# Patient Record
Sex: MALE | Race: WHITE | NOT HISPANIC OR LATINO | Employment: OTHER | URBAN - METROPOLITAN AREA
[De-identification: names, ages, dates, MRNs, and addresses within clinical notes are randomized per-mention and may not be internally consistent; named-entity substitution may affect disease eponyms.]

---

## 2017-06-15 ENCOUNTER — OFFICE VISIT (OUTPATIENT)
Dept: INTERNAL MEDICINE | Facility: CLINIC | Age: 82
End: 2017-06-15
Payer: MEDICARE

## 2017-06-15 VITALS
WEIGHT: 203.8 LBS | BODY MASS INDEX: 29.18 KG/M2 | SYSTOLIC BLOOD PRESSURE: 134 MMHG | TEMPERATURE: 98.3 F | OXYGEN SATURATION: 95 % | HEIGHT: 70 IN | DIASTOLIC BLOOD PRESSURE: 76 MMHG | HEART RATE: 70 BPM

## 2017-06-15 DIAGNOSIS — I10 ESSENTIAL HYPERTENSION, BENIGN: Primary | ICD-10-CM

## 2017-06-15 DIAGNOSIS — K59.00 CONSTIPATION, UNSPECIFIED CONSTIPATION TYPE: ICD-10-CM

## 2017-06-15 PROCEDURE — 99213 OFFICE O/P EST LOW 20 MIN: CPT | Performed by: INTERNAL MEDICINE

## 2017-06-15 NOTE — PROGRESS NOTES
SUBJECTIVE:                                                    Abundio Bekcett is a 81 year old male who presents to clinic today for the following health issues:    Constipation      Duration: Chronic, worsening lately      Description:       Frequency of bowel movements: 4-5 days       Consistency of stool: large, very hard     Intensity:  moderate    Accompanying signs and symptoms:        Abdominal pain: YES       Rectal pain: no        Blood in stool: no        Nausea/vomitting: no     History:        Similar problems in past: YES    Precipitating or alleviating factors:        Medications worsening symptoms: no     Therapies tried and outcome: Stool softeners OTC- help, but is taking up to 4 x daily. Uses Enemas occassionally as well        Other concerns:  1. Increased fatigue, decreased mood-discussed but more likely due to social stressors from wife's memory loss.    Problem list and histories reviewed & adjusted, as indicated.  Additional history: as documented    Patient Active Problem List   Diagnosis     Hypertrophy (benign) of prostate     Allergic rhinitis     Advance Care Planning     Hyperlipidemia LDL goal <100     Hyperglycemia     Lacunar infarction (H)     Hypothyroidism, unspecified type     Essential hypertension, benign     Past Surgical History:   Procedure Laterality Date     C REMV PROSTATE,PERINEAL,RADICAL  2000     HC SHOULDER ARTHROSCOPY, DX         Social History   Substance Use Topics     Smoking status: Never Smoker     Smokeless tobacco: Not on file     Alcohol use Yes      Comment: rare     History reviewed. No pertinent family history.      Current Outpatient Prescriptions   Medication Sig Dispense Refill     amLODIPine (NORVASC) 5 MG tablet Take 1 tablet (5 mg) by mouth daily 90 tablet 3     simvastatin (ZOCOR) 20 MG tablet Take 1 tablet (20 mg) by mouth At Bedtime 90 tablet 3     levothyroxine (SYNTHROID, LEVOTHROID) 137 MCG tablet Take 1 tablet (137 mcg) by mouth daily 90  "tablet 3     mometasone (NASONEX) 50 MCG/ACT nasal spray Spray 2 sprays into both nostrils daily 3 Box prn     aspirin 325 MG EC tablet Take 1 tablet by mouth daily. 100 tablet 3     XALATAN 0.005 % OP SOLN qd       BETIMOL OP qd       CENTRUM SILVER OR TABS 1 TABLET DAILY       GLUCOSAMINE CHONDROITIN OR TABS        Allergies   Allergen Reactions     No Known Drug Allergies      BP Readings from Last 3 Encounters:   09/29/16 122/74   09/15/16 136/78   09/10/16 146/89    Wt Readings from Last 3 Encounters:   09/29/16 204 lb (92.5 kg)   09/15/16 205 lb (93 kg)   09/10/16 205 lb (93 kg)            Reviewed and updated as needed this visit by clinical staff  Tobacco  Allergies  Med Hx  Surg Hx  Fam Hx  Soc Hx      Reviewed and updated as needed this visit by Provider       ROS:  C: NEGATIVE for fever, chills, change in weight  E/M: NEGATIVE for ear, mouth and throat problems  R: NEGATIVE for significant cough or SOB  CV: NEGATIVE for chest pain, palpitations or peripheral edema  : NEGATIVE for frequency, dysuria, or hematuria  M: NEGATIVE for significant arthralgias or myalgia  H: NEGATIVE for bleeding problems  P: NEGATIVE for changes in mood or affect    OBJECTIVE:                                                    /76  Pulse 70  Temp 98.3  F (36.8  C) (Oral)  Ht 5' 10\" (1.778 m)  Wt 203 lb 12.8 oz (92.4 kg)  SpO2 95%  BMI 29.24 kg/m2  Body mass index is 29.24 kg/(m^2).  GENERAL: healthy, alert and no distress  RESP: lungs clear to auscultation - no rales, no rhonchi, no wheezes  CV: regular rates and rhythm, normal S1 S2, no S3 or S4 and no click or rub   ABDOMEN: no tenderness, no  hepatosplenomegaly, no masses, normal bowel sounds  MS: extremities- no gross deformities noted,  PSYCH: Alert and oriented times 3; speech- coherent , normal rate and volume; able to articulate logical thoughts, able to abstract reason, no tangential thoughts, no hallucinations or delusions, affect- normal "     ASSESSMENT/PLAN:                                                    (I10) Essential hypertension, benign  (primary encounter diagnosis)  Comment: stable on therapy  Plan: discussed Amlodipine as related to below    (K59.00) Constipation, unspecified constipation type  Comment: discussed options with patient, suggest GI referral, stop OTC products, push fluids, trial Miralax, noted negative FIT screen last done  Plan: GASTROENTEROLOGY ADULT REF CONSULT ONLY          See Patient Instructions    Colton Chris MD  [facility]

## 2017-06-15 NOTE — MR AVS SNAPSHOT
After Visit Summary   6/15/2017    Abundio Beckett    MRN: [id number]           Patient Information     Date Of Birth          1935        Visit Information        Provider Department      6/15/2017 3:20 PM Colton Chris MD [facility]        Today's Diagnoses     Essential hypertension, benign    -  1    Constipation, unspecified constipation type           Follow-ups after your visit        Additional Services     GASTROENTEROLOGY ADULT REF CONSULT ONLY       Preferred Location: [facility] [phone]      Please be aware that coverage of these services is subject to the terms and limitations of your health insurance plan.  Call member services at your health plan with any benefit or coverage questions.  Any procedures must be performed at a [place] facility OR coordinated by your clinic's referral office.    Please bring the following with you to your appointment:    (1) Any X-Rays, CTs or MRIs which have been performed.  Contact the facility where they were done to arrange for  prior to your scheduled appointment.    (2) List of current medications   (3) This referral request   (4) Any documents/labs given to you for this referral                  Who to contact     If you have questions or need follow up information about today's clinic visit or your schedule please contact [facility] directly at [phone].  Normal or non-critical lab and imaging results will be communicated to you by MyChart, letter or phone within 4 business days after the clinic has received the results. If you do not hear from us within 7 days, please contact the clinic through MyChart or phone. If you have a critical or abnormal lab result, we will notify you by phone as soon as possible.  Submit refill requests through [employer] or call your pharmacy and they will forward the refill request to us. Please allow 3 business days for your refill to be completed.  "         Additional Information About Your Visit        MyChart Information     [employer] lets you send messages to your doctor, view your test results, renew your prescriptions, schedule appointments and more. To sign up, go to [email or web address] . Click on \"Log in\" on the left side of the screen, which will take you to the Welcome page. Then click on \"Sign up Now\" on the right side of the page.     You will be asked to enter the access code listed below, as well as some personal information. Please follow the directions to create your username and password.     Your access code is: [email or web address]  Expires: 2017  4:03 PM     Your access code will  in 90 days. If you need help or a new code, please call your [place] clinic or [phone].        Care EveryWhere ID     This is your Care EveryWhere ID. This could be used by other organizations to access your [place] medical records  [id number]        Your Vitals Were     Pulse Temperature Height Pulse Oximetry BMI (Body Mass Index)       70 98.3  F (36.8  C) (Oral) 5' 10\" (1.778 m) 95% 29.24 kg/m2        Blood Pressure from Last 3 Encounters:   06/15/17 134/76   16 122/74   09/15/16 136/78    Weight from Last 3 Encounters:   06/15/17 203 lb 12.8 oz (92.4 kg)   16 204 lb (92.5 kg)   09/15/16 205 lb (93 kg)              We Performed the Following     GASTROENTEROLOGY ADULT REF CONSULT ONLY        Primary Care Provider Office Phone # Fax #    Colton Chris [phone]       [facility] [address] [facility] [address]        Thank you!     Thank you for choosing [facility]  for your care. Our goal is always to provide you with excellent care. Hearing back from our patients is one way we can continue to improve our services. Please take a few minutes to complete the written survey that you may receive in the mail after your visit with us. Thank you!             Your Updated " Medication List - Protect others around you: Learn how to safely use, store and throw away your medicines at www.disposemymeds.org.          This list is accurate as of: 6/15/17  4:03 PM.  Always use your most recent med list.                   Brand Name Dispense Instructions for use    amLODIPine 5 MG tablet    NORVASC    90 tablet    Take 1 tablet (5 mg) by mouth daily       aspirin 325 MG EC tablet     100 tablet    Take 1 tablet by mouth daily.       BETIMOL OP      qd       CENTRUM SILVER per tablet      1 TABLET DAILY       GLUCOSAMINE CHONDROITIN Tabs          levothyroxine 137 MCG tablet    SYNTHROID/LEVOTHROID    90 tablet    Take 1 tablet (137 mcg) by mouth daily       mometasone 50 MCG/ACT spray    NASONEX    3 Box    Spray 2 sprays into both nostrils daily       simvastatin 20 MG tablet    ZOCOR    90 tablet    Take 1 tablet (20 mg) by mouth At Bedtime       XALATAN 0.005 % ophthalmic solution   Generic drug:  latanoprost      qd

## 2017-06-15 NOTE — NURSING NOTE
"Chief Complaint   Patient presents with     Gastrointestinal Problem       Initial /76  Pulse 70  Temp 98.3  F (36.8  C) (Oral)  Ht 5' 10\" (1.778 m)  Wt 203 lb 12.8 oz (92.4 kg)  SpO2 95%  BMI 29.24 kg/m2 Estimated body mass index is 29.24 kg/(m^2) as calculated from the following:    Height as of this encounter: 5' 10\" (1.778 m).    Weight as of this encounter: 203 lb 12.8 oz (92.4 kg).  Medication Reconciliation: complete   Nataly Uribe, JAYE      "

## 2017-07-24 ENCOUNTER — TRANSFERRED RECORDS (OUTPATIENT)
Dept: HEALTH INFORMATION MANAGEMENT | Facility: CLINIC | Age: 82
End: 2017-07-24

## 2017-08-14 ENCOUNTER — TELEPHONE (OUTPATIENT)
Dept: INTERNAL MEDICINE | Facility: CLINIC | Age: 82
End: 2017-08-14

## 2017-08-14 DIAGNOSIS — E03.9 HYPOTHYROIDISM: Primary | ICD-10-CM

## 2017-08-14 NOTE — TELEPHONE ENCOUNTER
Writer called and advised patient per MD not. Lab placed as future. Patient verbalized understanding.

## 2017-08-14 NOTE — TELEPHONE ENCOUNTER
Reason for call:  Results   Name of test or procedure: Lab tests  Date of test or procedure: 7/24/ or 07/31/17  Location of test or procedure: GI clinic    Additional comments: Patient had labs drawn in [facility] Clinic, MD told him to talk to Dr. Chris regarding results      Phone number to reach patient:  Home number on file [phone] (home)    Best Time:  anytime    Can we leave a detailed message on this number?  YES

## 2017-08-14 NOTE — TELEPHONE ENCOUNTER
PATRICK WADSWORTH informed patient TSH level drawn on 7/24/17 elevated and told patient to contact PCP.  TSH lab scanned into [employer] under laboratory tab.  Please see result and advise.  Thank you.

## 2017-09-05 ENCOUNTER — TRANSFERRED RECORDS (OUTPATIENT)
Dept: HEALTH INFORMATION MANAGEMENT | Facility: CLINIC | Age: 82
End: 2017-09-05

## 2017-09-07 DIAGNOSIS — I10 ESSENTIAL HYPERTENSION: ICD-10-CM

## 2017-09-07 RX ORDER — AMLODIPINE BESYLATE 5 MG/1
TABLET ORAL
Qty: 90 TABLET | Refills: 2 | Status: SHIPPED | OUTPATIENT
Start: 2017-09-07 | End: 2018-06-03

## 2017-09-07 NOTE — TELEPHONE ENCOUNTER
amLODIPine (NORVASC) 5 MG tablet      Last Written Prescription Date: 9/29/2016  Last Fill Quantity: 90, # refills: 3    Last Office Visit with [facility], [facility] or [facility] prescribing provider:  6/15/2017   Future Office Visit:    Next 5 appointments (look out 90 days)     Sep 14, 2017  9:40 AM CDT   PHYSICAL with Colton Chris MD   [facility]    [address] [address] [phone]                    BP Readings from Last 3 Encounters:   06/15/17 134/76   09/29/16 122/74   09/15/16 136/78

## 2017-09-14 ENCOUNTER — OFFICE VISIT (OUTPATIENT)
Dept: INTERNAL MEDICINE | Facility: CLINIC | Age: 82
End: 2017-09-14
Payer: MEDICARE

## 2017-09-14 VITALS
SYSTOLIC BLOOD PRESSURE: 133 MMHG | WEIGHT: 206 LBS | HEART RATE: 69 BPM | TEMPERATURE: 98.3 F | BODY MASS INDEX: 29.49 KG/M2 | HEIGHT: 70 IN | DIASTOLIC BLOOD PRESSURE: 77 MMHG | OXYGEN SATURATION: 96 %

## 2017-09-14 DIAGNOSIS — E03.9 HYPOTHYROIDISM, UNSPECIFIED TYPE: ICD-10-CM

## 2017-09-14 DIAGNOSIS — I63.81 LACUNAR INFARCTION (H): ICD-10-CM

## 2017-09-14 DIAGNOSIS — Z12.11 COLON CANCER SCREENING: ICD-10-CM

## 2017-09-14 DIAGNOSIS — E78.5 HYPERLIPIDEMIA LDL GOAL <100: ICD-10-CM

## 2017-09-14 DIAGNOSIS — Z00.00 MEDICARE ANNUAL WELLNESS VISIT, SUBSEQUENT: Primary | ICD-10-CM

## 2017-09-14 DIAGNOSIS — Z23 NEED FOR PROPHYLACTIC VACCINATION AND INOCULATION AGAINST INFLUENZA: ICD-10-CM

## 2017-09-14 PROCEDURE — G0439 PPPS, SUBSEQ VISIT: HCPCS | Performed by: INTERNAL MEDICINE

## 2017-09-14 PROCEDURE — G0008 ADMIN INFLUENZA VIRUS VAC: HCPCS | Performed by: INTERNAL MEDICINE

## 2017-09-14 PROCEDURE — 90662 IIV NO PRSV INCREASED AG IM: CPT | Performed by: INTERNAL MEDICINE

## 2017-09-14 RX ORDER — LEVOTHYROXINE SODIUM 137 UG/1
137 TABLET ORAL DAILY
Qty: 90 TABLET | Refills: 3 | Status: SHIPPED | OUTPATIENT
Start: 2017-09-14 | End: 2018-09-11

## 2017-09-14 RX ORDER — SIMVASTATIN 20 MG
20 TABLET ORAL AT BEDTIME
Qty: 90 TABLET | Refills: 3 | Status: SHIPPED | OUTPATIENT
Start: 2017-09-14 | End: 2018-09-11

## 2017-09-14 NOTE — PROGRESS NOTES
Injectable Influenza Immunization Documentation    1.  Are you sick today? (Fever of 100.5 or higher on the day of the clinic)   No    2.  Have you ever had Guillain-Callaway Syndrome within 6 weeks of an influenza vaccionation?  No    3. Do you have a life-threatening allergy to eggs?  No    4. Do you have a life-threatening allergy to a component of the vaccine? May include antibiotics, gelatin or latex.  No     5. Have you ever had a reaction to a dose of flu vaccine that needed immediate medical attention?  No     Form completed by Nataly Uribe CMA

## 2017-09-14 NOTE — MR AVS SNAPSHOT
After Visit Summary   9/14/2017    Abundio Beckett    MRN: [id number]           Patient Information     Date Of Birth          1935        Visit Information        Provider Department      9/14/2017 9:40 AM Colton Chris MD [facility]        Today's Diagnoses     Medicare annual wellness visit, subsequent    -  1    Lacunar infarction (H)        Hypothyroidism, unspecified type        Hyperlipidemia LDL goal <100        Colon cancer screening          Care Instructions          Services Typically covered by Medicare Recommended Completed   Vaccines    Pneumonoccol    Influenza    Hepatitis B (if medium/high risk)     Once for patients after age 65    Yearly  Medium/high risk factors:    End Stage Kidney Disease    Hemophiliacs who received Factor XIII or IX concentrates    Clients of institutions for developmentally disabled    Persons who live in same house as a Hepatitis B carrier    Homosexual men    Illicit injectable drug users    Health care workers     Mammogram Covered: One-time screen between age 35-39, annually for age 40+     Pap and Pelvic Exam Covered: Annually if  high risk,  or childbearing age with abnormal Pap in last 3 years.  Q24 months for all other women     Prostate Cancer Screening    Digital rectal exam    PSA Covered: Annually for all men > age 50     Corolrectal Cancer Screening Screening colonoscopy every 10 years, more often for high risk patients     Diabetes Self-Management Training Requires referral by treating physician for patient with diabetes     Diabetes Screening    Fasting blood sugar or glucose tolerance test   Once yearly, twice yearly if prediabetic     Cardiovascular Screening Blood Tests    Total Cholesterol    HDL    Triglycerides Every 5 years     Medical Nutrition Therapy for Diabetes or Renal Disease Requires referral by treating physician for patient with diabetes or kidney disease     Glaucoma Screening Annually for  patients with one of the following risk factors:    Diabetes Mellitus    Family history of Glaucoma    -American age 50 and over    -American age 65 and over     Bone Mass Measurement Every 24 months if one of the following risk factors:    Estrogen deficiency    Vertebral abnormalities on x-ray indicative of Osteoporosis, Osteopenia, or Vertebral fracture    Receiving/expected to receive the equivalent of at least 5 mg of Prednisone per day for > 3 months    Hyperparathyroidism    Patient being monitored for response to Osteoporosis Therapy     One-time AAA screen  Must be ordered as part of Medicare IPPE   Any patient with a family history of AAA    Males Age 65-75, with history of smoking at least 100 cigarettes in lifetime     Smoking Cessation Counseling Beneficiaries who use tobacco are eligible to receive 2 cessation attempts per year; each attempt includes maximum of 4 sessions     HIV Screening Annually for beneficiaries at increased risk:       Increased risk for HIV infection is defined in the  National Coverage Determinations (NCD) Manual,  Publication 100-03 Sections 190.14 (diagnostic) and 210.7 (screening). See http://www.cms.gov/manuals/downloads/bsb833s6_Nxkr8.pdf and http://www.cms.gov/manuals/downloads/qhc292r2_Dvxb6.pdf on the Internet.  Three times per pregnancy for beneficiaries who are pregnant.     Future Annual Wellness Visit Annually, for all beneficiaries.               Follow-ups after your visit        Future tests that were ordered for you today     Open Future Orders        Priority Expected Expires Ordered    Hemoglobin Routine 9/14/2017 10/31/2017 9/14/2017    Comprehensive metabolic panel Routine 9/14/2017 10/31/2017 9/14/2017    Lipid Profile Routine 9/14/2017 10/31/2017 9/14/2017    Fecal colorectal cancer screen (FIT) Routine 9/14/2017 10/31/2017 9/14/2017    TSH with free T4 reflex Routine 9/14/2017 10/31/2017 9/14/2017            Who to contact     If you have  "questions or need follow up information about today's clinic visit or your schedule please contact [facility] directly at [phone].  Normal or non-critical lab and imaging results will be communicated to you by MyChart, letter or phone within 4 business days after the clinic has received the results. If you do not hear from us within 7 days, please contact the clinic through [employer] or phone. If you have a critical or abnormal lab result, we will notify you by phone as soon as possible.  Submit refill requests through [employer] or call your pharmacy and they will forward the refill request to us. Please allow 3 business days for your refill to be completed.          Additional Information About Your Visit        [employer] Information     [employer] lets you send messages to your doctor, view your test results, renew your prescriptions, schedule appointments and more. To sign up, go to [email or web address] . Click on \"Log in\" on the left side of the screen, which will take you to the Welcome page. Then click on \"Sign up Now\" on the right side of the page.     You will be asked to enter the access code listed below, as well as some personal information. Please follow the directions to create your username and password.     Your access code is: [email or web address]  Expires: 2017 10:08 AM     Your access code will  in 90 days. If you need help or a new code, please call your [place] clinic or [phone].        Care EveryWhere ID     This is your Care EveryWhere ID. This could be used by other organizations to access your [place] medical records  [id number]        Your Vitals Were     Pulse Temperature Height Pulse Oximetry BMI (Body Mass Index)       69 98.3  F (36.8  C) (Oral) 5' 10\" (1.778 m) 96% 29.56 kg/m2        Blood Pressure from Last 3 Encounters:   17 133/77   06/15/17 134/76   16 122/74    Weight from Last 3 Encounters:   17 206 lb (93.4 kg)   06/15/17 203 " lb 12.8 oz (92.4 kg)   09/29/16 204 lb (92.5 kg)                 Where to get your medicines      These medications were sent to [facility] - [place], [place] - [address]  [address] [address]     Phone:  [phone]     levothyroxine 137 MCG tablet    simvastatin 20 MG tablet          Primary Care Provider Office Phone # Fax #    Colton Chris [phone]       [address] [facility] [address]        Equal Access to Services     [facility]: Hadii aad ku hadasho Soomaali, waaxda luqadaha, qaybta kaalmada adeegyada, waxay jose juan hayjosen bryce vega . So [facility] [phone].    ATENCIÓN: Si habla español, tiene a livingston disposición servicios gratuitos de asistencia lingüística. [facility] [phone].    We comply with applicable federal civil rights laws and [place] laws. We do not discriminate on the basis of race, color, national origin, age, disability sex, sexual orientation or gender identity.            Thank you!     Thank you for choosing [facility]  for your care. Our goal is always to provide you with excellent care. Hearing back from our patients is one way we can continue to improve our services. Please take a few minutes to complete the written survey that you may receive in the mail after your visit with us. Thank you!             Your Updated Medication List - Protect others around you: Learn how to safely use, store and throw away your medicines at www.disposemymeds.org.          This list is accurate as of: 9/14/17 10:08 AM.  Always use your most recent med list.                   Brand Name Dispense Instructions for use Diagnosis    amLODIPine 5 MG tablet    NORVASC    90 tablet    TAKE 1 TABLET DAILY    Essential hypertension       aspirin 325 MG EC tablet     100 tablet    Take 1 tablet by mouth daily.    Diplopias, intermittent, Lacunar infarction (H)       BETIMOL OP      qd        CENTRUM  SILVER per tablet      1 TABLET DAILY        GLUCOSAMINE CHONDROITIN Tabs           levothyroxine 137 MCG tablet    SYNTHROID/LEVOTHROID    90 tablet    Take 1 tablet (137 mcg) by mouth daily    Hypothyroidism, unspecified type       simvastatin 20 MG tablet    ZOCOR    90 tablet    Take 1 tablet (20 mg) by mouth At Bedtime    Hyperlipidemia LDL goal <100, Lacunar infarction (H)       XALATAN 0.005 % ophthalmic solution   Generic drug:  latanoprost      qd

## 2017-09-14 NOTE — NURSING NOTE
"Chief Complaint   Patient presents with     Wellness Visit       Initial /77  Pulse 69  Temp 98.3  F (36.8  C) (Oral)  Ht 5' 10\" (1.778 m)  Wt 206 lb (93.4 kg)  SpO2 96%  BMI 29.56 kg/m2 Estimated body mass index is 29.56 kg/(m^2) as calculated from the following:    Height as of this encounter: 5' 10\" (1.778 m).    Weight as of this encounter: 206 lb (93.4 kg).  Medication Reconciliation: complete   Nataly Uribe CMA      "

## 2017-09-14 NOTE — PROGRESS NOTES
SUBJECTIVE:   Abundio Beckett is a 81 year old male who presents for Preventive Visit.  Are you in the first 12 months of your Medicare Part B coverage?  No    Healthy Habits:    Do you get at least three servings of calcium containing foods daily (dairy, green leafy vegetables, etc.)? yes    Amount of exercise or daily activities, outside of work: none    Problems taking medications regularly No    Medication side effects: No    Have you had an eye exam in the past two years? yes    Do you see a dentist twice per year? yes    Do you have sleep apnea, excessive snoring or daytime drowsiness?no    Reviewed and updated as needed this visit by clinical staff  Tobacco  Allergies  Problems  Med Hx  Surg Hx  Fam Hx  Soc Hx        Reviewed and updated as needed this visit by Provider        Social History   Substance Use Topics     Smoking status: Never Smoker     Smokeless tobacco: Never Used     Alcohol use Yes      Comment: rare       The patient does not drink >3 drinks per day nor >7 drinks per week.    Today's PHQ-2 Score:   PHQ-2 ( 1999 Pfizer) 6/15/2017 9/29/2016   Q1: Little interest or pleasure in doing things 1 0   Q2: Feeling down, depressed or hopeless 1 0   PHQ-2 Score 2 0     Do you feel safe in your environment - Yes    Do you have a Health Care Directive?: No: Advance care planning was reviewed with patient; patient declined at this time.      Current providers sharing in care for this patient include: Patient Care Team:  Colton Chris MD as PCP - General      Hearing impairment: No    Ability to successfully perform activities of daily living: Yes, no assistance needed     Fall risk:  Fall Risk Assessment not completed.    Home safety:  none identified    The following health maintenance items are reviewed in Epic and correct as of today:  Health Maintenance   Topic Date Due     TSH Q1 YEAR  09/06/2017     INFLUENZA VACCINE (SYSTEM ASSIGNED)  09/01/2017     MEDICARE ANNUAL WELLNESS VISIT   "09/07/2017     COLONOSCOPY Q5 YR  01/01/2018     FALL RISK ASSESSMENT  06/15/2018     TETANUS IMMUNIZATION (SYSTEM ASSIGNED)  08/26/2018     ADVANCE DIRECTIVE PLANNING Q5 YRS  11/28/2021     PNEUMOCOCCAL  Completed       Immunization History   Administered Date(s) Administered     HEPA 06/05/2000, 06/11/2002     Influenza (H1N1) 12/21/2009     Influenza (High Dose) 3 valent vaccine 10/01/2015, 09/07/2016     Influenza (IIV3) 11/22/2000, 09/25/2010, 09/03/2011     LYMERIX 05/12/1999, 06/24/1999, 06/02/2000     Pneumococcal (PCV 13) 06/02/2016     Pneumococcal 23 valent 10/11/1999, 05/22/2006     Poliovirus, inactivated (IPV) 06/05/2000     TD (ADULT, 7+) 01/13/1997, 08/26/2008     Zoster vaccine, live 07/16/2009       ROS:  C: NEGATIVE for fever, chills, change in weight  E/M: NEGATIVE for ear, mouth and throat problems  R: NEGATIVE for significant cough or SOB  CV: NEGATIVE for chest pain, palpitations or peripheral edema  GI: NEGATIVE for nausea, abdominal pain, heartburn, or change in bowel habits  : NEGATIVE for frequency, dysuria, or hematuria  M: NEGATIVE for significant arthralgias or myalgia  H: NEGATIVE for bleeding problems  P: NEGATIVE for changes in mood or affect    OBJECTIVE:   /77  Pulse 69  Temp 98.3  F (36.8  C) (Oral)  Ht 5' 10\" (1.778 m)  Wt 206 lb (93.4 kg)  SpO2 96%  BMI 29.56 kg/m2 Estimated body mass index is 29.24 kg/(m^2) as calculated from the following:    Height as of 6/15/17: 5' 10\" (1.778 m).    Weight as of 6/15/17: 203 lb 12.8 oz (92.4 kg).  EXAM:   GENERAL: alert and no distress  EYES: Eyes grossly normal to inspection, PERRL and conjunctivae and sclerae normal  HENT: ear canals and TM's normal, nose and mouth without ulcers or lesions  NECK: no adenopathy, no asymmetry, masses, or scars and thyroid normal to palpation  RESP: lungs clear to auscultation - no rales, rhonchi or wheezes  CV: regular rate and rhythm, normal S1 S2, no S3 or S4, no murmur, click or rub, no " "peripheral edema and peripheral pulses strong  ABDOMEN: soft, nontender, no hepatosplenomegaly, no masses and bowel sounds normal  MS: no gross musculoskeletal defects noted, no edema  NEURO: Normal strength and tone, mentation intact and speech normal  PSYCH: mentation appears normal, affect normal/bright    ASSESSMENT / PLAN:   1. Medicare annual wellness visit, subsequent  As ordered  - Hemoglobin; Future  - Comprehensive metabolic panel; Future  - Lipid Profile; Future    2. Lacunar infarction (H)  Stable as noted  - simvastatin (ZOCOR) 20 MG tablet; Take 1 tablet (20 mg) by mouth At Bedtime  Dispense: 90 tablet; Refill: 3  - TSH with free T4 reflex; Future    3. Hypothyroidism, unspecified type  Labs as ordered  - levothyroxine (SYNTHROID/LEVOTHROID) 137 MCG tablet; Take 1 tablet (137 mcg) by mouth daily  Dispense: 90 tablet; Refill: 3  - TSH with free T4 reflex; Future    4. Hyperlipidemia LDL goal <100  Labs as ordered  - simvastatin (ZOCOR) 20 MG tablet; Take 1 tablet (20 mg) by mouth At Bedtime  Dispense: 90 tablet; Refill: 3  - Lipid Profile; Future    5. Colon cancer screening  As screening  - Fecal colorectal cancer screen (FIT); Future    End of Life Planning:  Patient currently has an advanced directive: No.  I have verified the patient's ablity to prepare an advanced directive/make health care decisions.  Literature was provided to assist patient in preparing an advanced directive.    COUNSELING:  Reviewed preventive health counseling, as reflected in patient instructions       Regular exercise       Healthy diet/nutrition      Estimated body mass index is 29.24 kg/(m^2) as calculated from the following:    Height as of 6/15/17: 5' 10\" (1.778 m).    Weight as of 6/15/17: 203 lb 12.8 oz (92.4 kg).     reports that he has never smoked. He has never used smokeless tobacco.    Appropriate preventive services were discussed with this patient, including applicable screening as appropriate for cardiovascular " disease, diabetes, osteopenia/osteoporosis, and glaucoma.  As appropriate for age/gender, discussed screening for colorectal cancer, prostate cancer. Checklist reviewing preventive services available has been given to the patient.    Reviewed patients plan of care and provided an AVS. The Basic Care Plan (routine screening as documented in Health Maintenance) for Abundio meets the Care Plan requirement. This Care Plan has been established and reviewed with the Patient.    Counseling Resources:  ATP IV Guidelines  Pooled Cohorts Equation Calculator  Breast Cancer Risk Calculator  FRAX Risk Assessment  ICSI Preventive Guidelines  Dietary Guidelines for Americans, 2010  [employer]'s MyPlate  ASA Prophylaxis  Lung CA Screening    Colton Chris MD  [facility]    THE MEDICATION LIST HAS BEEN FULLY RECONCILED BY THE M.D. AND THE NURSING STAFF.    Answers for HPI/ROS submitted by the patient on 9/14/2017   Annual Exam:  Getting at least 3 servings of Calcium per day:: Yes  Bi-annual eye exam:: Yes  Dental care twice a year:: Yes  Sleep apnea or symptoms of sleep apnea:: None  Taking medications regularly:: Yes  Medication side effects:: None  Additional concerns today:: No  PHQ-2 Score: 2

## 2017-09-14 NOTE — PATIENT INSTRUCTIONS
Services Typically covered by Medicare Recommended Completed   Vaccines    Pneumonoccol    Influenza    Hepatitis B (if medium/high risk)     Once for patients after age 65    Yearly  Medium/high risk factors:    End Stage Kidney Disease    Hemophiliacs who received Factor XIII or IX concentrates    Clients of institutions for developmentally disabled    Persons who live in same house as a Hepatitis B carrier    Homosexual men    Illicit injectable drug users    Health care workers     Mammogram Covered: One-time screen between age 35-39, annually for age 40+     Pap and Pelvic Exam Covered: Annually if  high risk,  or childbearing age with abnormal Pap in last 3 years.  Q24 months for all other women     Prostate Cancer Screening    Digital rectal exam    PSA Covered: Annually for all men > age 50     Corolrectal Cancer Screening Screening colonoscopy every 10 years, more often for high risk patients     Diabetes Self-Management Training Requires referral by treating physician for patient with diabetes     Diabetes Screening    Fasting blood sugar or glucose tolerance test   Once yearly, twice yearly if prediabetic     Cardiovascular Screening Blood Tests    Total Cholesterol    HDL    Triglycerides Every 5 years     Medical Nutrition Therapy for Diabetes or Renal Disease Requires referral by treating physician for patient with diabetes or kidney disease     Glaucoma Screening Annually for patients with one of the following risk factors:    Diabetes Mellitus    Family history of Glaucoma    -American age 50 and over    -American age 65 and over     Bone Mass Measurement Every 24 months if one of the following risk factors:    Estrogen deficiency    Vertebral abnormalities on x-ray indicative of Osteoporosis, Osteopenia, or Vertebral fracture    Receiving/expected to receive the equivalent of at least 5 mg of Prednisone per day for > 3 months    Hyperparathyroidism    Patient being monitored for  response to Osteoporosis Therapy     One-time AAA screen  Must be ordered as part of Medicare IPPE   Any patient with a family history of AAA    Males Age 65-75, with history of smoking at least 100 cigarettes in lifetime     Smoking Cessation Counseling Beneficiaries who use tobacco are eligible to receive 2 cessation attempts per year; each attempt includes maximum of 4 sessions     HIV Screening Annually for beneficiaries at increased risk:       Increased risk for HIV infection is defined in the  National Coverage Determinations (NCD) Manual,  Publication 100-03 Sections 190.14 (diagnostic) and 210.7 (screening). See http://www.cms.gov/manuals/downloads/vnf474h1_Ibsh2.pdf and http://www.cms.gov/manuals/downloads/kjd529w5_Mjdf8.pdf on the Internet.  Three times per pregnancy for beneficiaries who are pregnant.     Future Annual Wellness Visit Annually, for all beneficiaries.

## 2017-09-15 DIAGNOSIS — E03.9 HYPOTHYROIDISM, UNSPECIFIED TYPE: ICD-10-CM

## 2017-09-15 DIAGNOSIS — I63.81 LACUNAR INFARCTION (H): ICD-10-CM

## 2017-09-15 DIAGNOSIS — Z00.00 MEDICARE ANNUAL WELLNESS VISIT, SUBSEQUENT: ICD-10-CM

## 2017-09-15 DIAGNOSIS — E78.5 HYPERLIPIDEMIA LDL GOAL <100: ICD-10-CM

## 2017-09-15 LAB
ALBUMIN SERPL-MCNC: 3.7 G/DL (ref 3.4–5)
ALP SERPL-CCNC: 67 U/L (ref 40–150)
ALT SERPL W P-5'-P-CCNC: 17 U/L (ref 0–70)
ANION GAP SERPL CALCULATED.3IONS-SCNC: 2 MMOL/L (ref 3–14)
AST SERPL W P-5'-P-CCNC: 9 U/L (ref 0–45)
BILIRUB SERPL-MCNC: 0.9 MG/DL (ref 0.2–1.3)
BUN SERPL-MCNC: 13 MG/DL (ref 7–30)
CALCIUM SERPL-MCNC: 8.5 MG/DL (ref 8.5–10.1)
CHLORIDE SERPL-SCNC: 106 MMOL/L (ref 94–109)
CHOLEST SERPL-MCNC: 115 MG/DL
CO2 SERPL-SCNC: 32 MMOL/L (ref 20–32)
CREAT SERPL-MCNC: 1.06 MG/DL (ref 0.66–1.25)
GFR SERPL CREATININE-BSD FRML MDRD: 67 ML/MIN/1.7M2
GLUCOSE SERPL-MCNC: 106 MG/DL (ref 70–99)
HDLC SERPL-MCNC: 53 MG/DL
HGB BLD-MCNC: 14.8 G/DL (ref 13.3–17.7)
LDLC SERPL CALC-MCNC: 46 MG/DL
NONHDLC SERPL-MCNC: 62 MG/DL
POTASSIUM SERPL-SCNC: 4 MMOL/L (ref 3.4–5.3)
PROT SERPL-MCNC: 7.1 G/DL (ref 6.8–8.8)
SODIUM SERPL-SCNC: 140 MMOL/L (ref 133–144)
T4 FREE SERPL-MCNC: 1.04 NG/DL (ref 0.76–1.46)
TRIGL SERPL-MCNC: 82 MG/DL
TSH SERPL DL<=0.005 MIU/L-ACNC: 4.08 MU/L (ref 0.4–4)

## 2017-09-15 PROCEDURE — 85018 HEMOGLOBIN: CPT | Performed by: INTERNAL MEDICINE

## 2017-09-15 PROCEDURE — 80061 LIPID PANEL: CPT | Performed by: INTERNAL MEDICINE

## 2017-09-15 PROCEDURE — 80053 COMPREHEN METABOLIC PANEL: CPT | Performed by: INTERNAL MEDICINE

## 2017-09-15 PROCEDURE — 36415 COLL VENOUS BLD VENIPUNCTURE: CPT | Performed by: INTERNAL MEDICINE

## 2017-09-15 PROCEDURE — 84443 ASSAY THYROID STIM HORMONE: CPT | Performed by: INTERNAL MEDICINE

## 2017-09-15 PROCEDURE — 84439 ASSAY OF FREE THYROXINE: CPT | Performed by: INTERNAL MEDICINE

## 2017-10-02 ENCOUNTER — TELEPHONE (OUTPATIENT)
Dept: INTERNAL MEDICINE | Facility: CLINIC | Age: 82
End: 2017-10-02

## 2017-10-02 NOTE — TELEPHONE ENCOUNTER
Reason for Call:  Form, our goal is to have forms completed with 72 hours, however, some forms may require a visit or additional information.    Type of letter, form or note:  HEALTH CARE DIRECTIVE    Who is the form from?: Patient    Where did the form come from: Patient or family brought in       What clinic location was the form placed at?: Internal Medicine    Where the form was placed: GIVEN TO [facility]    What number is listed as a contact on the form?: NONE        Additional comments: Patient would like these scanned into his chart.    Call taken on 10/2/2017 at 4:09 PM by Sosa Hinds

## 2018-03-03 ENCOUNTER — HEALTH MAINTENANCE LETTER (OUTPATIENT)
Age: 83
End: 2018-03-03

## 2018-06-03 DIAGNOSIS — I10 ESSENTIAL HYPERTENSION: ICD-10-CM

## 2018-06-05 RX ORDER — AMLODIPINE BESYLATE 5 MG/1
TABLET ORAL
Qty: 90 TABLET | Refills: 0 | Status: SHIPPED | OUTPATIENT
Start: 2018-06-05 | End: 2018-09-03

## 2018-06-18 ENCOUNTER — OFFICE VISIT (OUTPATIENT)
Dept: INTERNAL MEDICINE | Facility: CLINIC | Age: 83
End: 2018-06-18
Payer: MEDICARE

## 2018-06-18 VITALS
HEIGHT: 70 IN | WEIGHT: 200.3 LBS | SYSTOLIC BLOOD PRESSURE: 124 MMHG | DIASTOLIC BLOOD PRESSURE: 78 MMHG | OXYGEN SATURATION: 96 % | HEART RATE: 67 BPM | BODY MASS INDEX: 28.67 KG/M2 | RESPIRATION RATE: 16 BRPM

## 2018-06-18 DIAGNOSIS — F43.21 GRIEF REACTION: ICD-10-CM

## 2018-06-18 DIAGNOSIS — I63.81 LACUNAR INFARCTION (H): ICD-10-CM

## 2018-06-18 DIAGNOSIS — E03.9 HYPOTHYROIDISM, UNSPECIFIED TYPE: Primary | ICD-10-CM

## 2018-06-18 DIAGNOSIS — I10 ESSENTIAL HYPERTENSION, BENIGN: ICD-10-CM

## 2018-06-18 LAB — TSH SERPL DL<=0.005 MIU/L-ACNC: 2.2 MU/L (ref 0.4–4)

## 2018-06-18 PROCEDURE — 36415 COLL VENOUS BLD VENIPUNCTURE: CPT | Performed by: INTERNAL MEDICINE

## 2018-06-18 PROCEDURE — 99214 OFFICE O/P EST MOD 30 MIN: CPT | Performed by: INTERNAL MEDICINE

## 2018-06-18 PROCEDURE — 84443 ASSAY THYROID STIM HORMONE: CPT | Performed by: INTERNAL MEDICINE

## 2018-06-18 NOTE — LETTER
[facility]  [address] [address]  [phone]      6/19/2018       Abundio Beckett  [id number]   [place] [facility] [address]        Dear Abundio,    Your thyroid function tests look good and thus I would not change anything at this point.    Sincerely,      Colton Chris MD  Internal Medicine

## 2018-06-18 NOTE — MR AVS SNAPSHOT
After Visit Summary   6/18/2018    Abundio Beckett    MRN: [id number]           Patient Information     Date Of Birth          1935        Visit Information        Provider Department      6/18/2018 3:40 PM Colton Chris MD [facility]        Today's Diagnoses     Hypothyroidism, unspecified type    -  1    Essential hypertension, benign        Lacunar infarction (H)        Grief reaction           Follow-ups after your visit        Additional Services     MENTAL HEALTH REFERRAL  - Adult; Outpatient Treatment; Individual/Couples/Family/Group Therapy/Health Psychology; G: [facility] [phone]; We will contact you to schedule the appointment or please call with any questions       All scheduling is subject to the client's specific insurance plan & benefits, provider/location availability, and provider clinical specialities.  Please arrive 15 minutes early for your first appointment and bring your completed paperwork.    Please be aware that coverage of these services is subject to the terms and limitations of your health insurance plan.  Call member services at your health plan with any benefit or coverage questions.                            Follow-up notes from your care team     Return in about 3 months (around 9/18/2018), or if symptoms worsen or fail to improve, for Physical Exam.      Who to contact     If you have questions or need follow up information about today's clinic visit or your schedule please contact [facility] directly at [phone].  Normal or non-critical lab and imaging results will be communicated to you by MyChart, letter or phone within 4 business days after the clinic has received the results. If you do not hear from us within 7 days, please contact the clinic through MyChart or phone. If you have a critical or abnormal lab result, we will notify you by phone as soon as possible.  Submit refill  "requests through [facility] or call your pharmacy and they will forward the refill request to us. Please allow 3 business days for your refill to be completed.          Additional Information About Your Visit        [employer] Information     [facility] lets you send messages to your doctor, view your test results, renew your prescriptions, schedule appointments and more. To sign up, go to [email or web address] . Click on \"Log in\" on the left side of the screen, which will take you to the Welcome page. Then click on \"Sign up Now\" on the right side of the page.     You will be asked to enter the access code listed below, as well as some personal information. Please follow the directions to create your username and password.     Your access code is: [email or web address]  Expires: 2018  3:34 PM     Your access code will  in 90 days. If you need help or a new code, please call your [place] clinic or [phone].        Care EveryWhere ID     This is your Care EveryWhere ID. This could be used by other organizations to access your [place] medical records  [id number]        Your Vitals Were     Pulse Respirations Height Pulse Oximetry BMI (Body Mass Index)       67 16 5' 10\" (1.778 m) 96% 28.74 kg/m2        Blood Pressure from Last 3 Encounters:   18 124/78   17 133/77   06/15/17 134/76    Weight from Last 3 Encounters:   18 200 lb 4.8 oz (90.9 kg)   17 206 lb (93.4 kg)   06/15/17 203 lb 12.8 oz (92.4 kg)              We Performed the Following     MENTAL HEALTH REFERRAL  - Adult; Outpatient Treatment; Individual/Couples/Family/Group Therapy/Health Psychology; [facility]: [facility] [phone]; We will contact you to schedule the appointment or please call with any questions     TSH with free T4 reflex        Primary Care Provider Office Phone # Fax #    Colton Chris [phone]       [address] [place] [id number]        Equal Access to Services     " JAVIER [facility] HospitalKAYKAY : Hadii aad ku regi Barajas, waaxda luqadaha, qaybta kaalmada adesantana, arnoldo jose juan jose angelmaldonado wu antoniogladys vega . So [facility] [phone].    ATENCIÓN: Si romana gary, tiene a livingston disposición servicios gratuitos de asistencia lingüística. Llame al [phone].    We comply with applicable federal civil rights laws and [place] laws. We do not discriminate on the basis of race, color, national origin, age, disability, sex, sexual orientation, or gender identity.            Thank you!     Thank you for choosing [facility]  for your care. Our goal is always to provide you with excellent care. Hearing back from our patients is one way we can continue to improve our services. Please take a few minutes to complete the written survey that you may receive in the mail after your visit with us. Thank you!             Your Updated Medication List - Protect others around you: Learn how to safely use, store and throw away your medicines at www.disposemymeds.org.          This list is accurate as of 6/18/18  4:03 PM.  Always use your most recent med list.                   Brand Name Dispense Instructions for use Diagnosis    amLODIPine 5 MG tablet    NORVASC    90 tablet    TAKE 1 TABLET DAILY    Essential hypertension       aspirin 325 MG EC tablet     100 tablet    Take 1 tablet by mouth daily.    Diplopias, intermittent, Lacunar infarction (H)       BETIMOL OP      qd        CENTRUM SILVER per tablet      1 TABLET DAILY        GLUCOSAMINE CHONDROITIN Tabs           LAXATIVE PO           levothyroxine 137 MCG tablet    SYNTHROID/LEVOTHROID    90 tablet    Take 1 tablet (137 mcg) by mouth daily    Hypothyroidism, unspecified type       simvastatin 20 MG tablet    ZOCOR    90 tablet    Take 1 tablet (20 mg) by mouth At Bedtime    Hyperlipidemia LDL goal <100, Lacunar infarction (H)       XALATAN 0.005 % ophthalmic solution   Generic drug:  latanoprost      qd

## 2018-06-18 NOTE — PROGRESS NOTES
SUBJECTIVE:   Abundio Beckett is a 82 year old male who presents to clinic today for the following health issues:    Hypothyroidism Follow-up      Since last visit, patient describes the following symptoms: Weight stable, no hair loss, no skin changes, no constipation, no loose stools, anxiety, depression and fatigue      Amount of exercise or physical activity: 2-3 days/week for an average of 45-60 minutes    Problems taking medications regularly: No    Medication side effects: none    Diet: regular (no restrictions)    Other concerns:  1. Wife moved into memory care facility at the end of May- patient would like to discuss life changes.     Problem list and histories reviewed & adjusted, as indicated.  Additional history: as documented    Patient Active Problem List   Diagnosis     Hypertrophy (benign) of prostate     Allergic rhinitis     Advance Care Planning     Hyperlipidemia LDL goal <100     Hyperglycemia     Lacunar infarction (H)     Hypothyroidism, unspecified type     Essential hypertension, benign     Past Surgical History:   Procedure Laterality Date     C REMV PROSTATE,PERINEAL,RADICAL  2000     HC SHOULDER ARTHROSCOPY, DX         Social History   Substance Use Topics     Smoking status: Never Smoker     Smokeless tobacco: Never Used     Alcohol use Yes      Comment: rare     No family history on file.      Current Outpatient Prescriptions   Medication Sig Dispense Refill     amLODIPine (NORVASC) 5 MG tablet TAKE 1 TABLET DAILY 90 tablet 0     aspirin 325 MG EC tablet Take 1 tablet by mouth daily. 100 tablet 3     BETIMOL OP qd       Bisacodyl (LAXATIVE PO)        CENTRUM SILVER OR TABS 1 TABLET DAILY       levothyroxine (SYNTHROID/LEVOTHROID) 137 MCG tablet Take 1 tablet (137 mcg) by mouth daily 90 tablet 3     simvastatin (ZOCOR) 20 MG tablet Take 1 tablet (20 mg) by mouth At Bedtime 90 tablet 3     XALATAN 0.005 % OP SOLN qd       GLUCOSAMINE CHONDROITIN OR TABS        Allergies   Allergen  "Reactions     No Known Drug Allergies      BP Readings from Last 3 Encounters:   09/14/17 133/77   06/15/17 134/76   09/29/16 122/74    Wt Readings from Last 3 Encounters:   09/14/17 206 lb (93.4 kg)   06/15/17 203 lb 12.8 oz (92.4 kg)   09/29/16 204 lb (92.5 kg)         Reviewed and updated as needed this visit by clinical staff       Reviewed and updated as needed this visit by Provider       ROS:  CONSTITUTIONAL: NEGATIVE for fever, chills, change in weight  ENT/MOUTH: NEGATIVE for ear, mouth and throat problems  RESP: NEGATIVE for significant cough or SOB  CV: NEGATIVE for chest pain, palpitations or peripheral edema  GI: NEGATIVE for nausea, abdominal pain, heartburn, or change in bowel habits  : NEGATIVE for frequency, dysuria, or hematuria  MUSCULOSKELETAL: NEGATIVE for significant arthralgias or myalgia  HEME: NEGATIVE for bleeding problems  PSYCHIATRIC: NEGATIVE for changes in mood or affect    OBJECTIVE:                                                    /78  Pulse 67  Resp 16  Ht 5' 10\" (1.778 m)  Wt 200 lb 4.8 oz (90.9 kg)  SpO2 96%  BMI 28.74 kg/m2  Body mass index is 28.74 kg/(m^2).  GENERAL:  alert and no distress  EYES: Eyes grossly normal to inspection, extraocular movements - intact, and PERRL  HENT: ear canals- normal; TMs- normal; Nose- normal; Mouth- no ulcers, no lesions  NECK: no tenderness, no adenopathy, no asymmetry, no masses, no stiffness; thyroid- normal to palpation  RESP: lungs clear to auscultation - no rales, no rhonchi, no wheezes  CV: regular rates and rhythm, normal S1 S2, no S3 or S4 and no click or rub   MS: extremities- no gross deformities noted.  NEURO:  No focal changes  PSYCH: Alert and oriented times 3; speech- coherent , normal rate and volume; able to articulate logical thoughts, able to abstract reason, no tangential thoughts, no hallucinations or delusions, affect- weepy.     ASSESSMENT/PLAN:                                                      (E03.9) " Hypothyroidism, unspecified type  (primary encounter diagnosis)  Comment: recheck labs as ordered  Plan: TSH with free T4 reflex            (I10) Essential hypertension, benign  Comment: stable on therapy  Plan:     (I63.9) Lacunar infarction (H)  Comment: stable w/o residual changes  Plan:       (F43.20) Grief reaction  Comment: holding meds, reaction to wife in care setting  Plan: MENTAL HEALTH REFERRAL  - Adult; Outpatient         Treatment; Individual/Couples/Family/Group         Therapy/Health Psychology; G: [facility] [phone]; We will         contact you to schedule the appointment or         please call with any questions           See Patient Instructions and discussed with Lopez about his wife now in memory care unit.    Colton Chris MD  [facility]    THE MEDICATION LIST HAS BEEN FULLY RECONCILED BY THE M.D. AND THE NURSING STAFF.

## 2018-09-03 DIAGNOSIS — I10 ESSENTIAL HYPERTENSION: ICD-10-CM

## 2018-09-03 NOTE — TELEPHONE ENCOUNTER
"Requested Prescriptions   Pending Prescriptions Disp Refills     amLODIPine (NORVASC) 5 MG tablet [Pharmacy Med Name: AMLODIPINE BESYLATE TABS 5MG]  Last Written Prescription Date:  06/05/2018  Last Fill Quantity: 90,  # refills: 0   Last Office Visit: 6/18/2018   Future Office Visit:      90 tablet 0     Sig: TAKE 1 TABLET DAILY    Calcium Channel Blockers Protocol  Passed    9/3/2018 12:04 AM       Passed - Blood pressure under 140/90 in past 12 months    BP Readings from Last 3 Encounters:   06/18/18 124/78   09/14/17 133/77   06/15/17 134/76                Passed - Recent (12 mo) or future (30 days) visit within the authorizing provider's specialty    Patient had office visit in the last 12 months or has a visit in the next 30 days with authorizing provider or within the authorizing provider's specialty.  See \"Patient Info\" tab in inbasket, or \"Choose Columns\" in Meds & Orders section of the refill encounter.           Passed - Patient is age 18 or older       Passed - Normal serum creatinine on file in past 12 months    Recent Labs   Lab Test  09/15/17   0917   06/06/16   2106   CR  1.06   < >   --    CREAT   --    --   1.0    < > = values in this interval not displayed.               "

## 2018-09-05 RX ORDER — AMLODIPINE BESYLATE 5 MG/1
TABLET ORAL
Qty: 90 TABLET | Refills: 0 | Status: SHIPPED | OUTPATIENT
Start: 2018-09-05 | End: 2018-09-24

## 2018-09-11 DIAGNOSIS — I63.81 LACUNAR INFARCTION (H): ICD-10-CM

## 2018-09-11 DIAGNOSIS — E78.5 HYPERLIPIDEMIA LDL GOAL <100: ICD-10-CM

## 2018-09-11 DIAGNOSIS — E03.9 HYPOTHYROIDISM, UNSPECIFIED TYPE: ICD-10-CM

## 2018-09-11 RX ORDER — LEVOTHYROXINE SODIUM 137 UG/1
TABLET ORAL
Qty: 90 TABLET | Refills: 2 | Status: SHIPPED | OUTPATIENT
Start: 2018-09-11 | End: 2018-09-24

## 2018-09-11 RX ORDER — SIMVASTATIN 20 MG
TABLET ORAL
Qty: 30 TABLET | Refills: 0 | Status: SHIPPED | OUTPATIENT
Start: 2018-09-11 | End: 2018-09-24

## 2018-09-11 NOTE — TELEPHONE ENCOUNTER
"Requested Prescriptions   Pending Prescriptions Disp Refills     levothyroxine (SYNTHROID/LEVOTHROID) 137 MCG tablet [Pharmacy Med Name: L-THYROXINE (SYNTHROID) TABS 137MCG] 90 tablet 3    Last Written Prescription Date:  09/14/17  Last Fill Quantity: 90 tab,  # refills: 3   Last office visit: 6/18/2018 with prescribing provider:     Future Office Visit:     Sig: TAKE 1 TABLET DAILY    Thyroid Protocol Passed    9/11/2018 12:32 AM       Passed - Patient is 12 years or older       Passed - Recent (12 mo) or future (30 days) visit within the authorizing provider's specialty    Patient had office visit in the last 12 months or has a visit in the next 30 days with authorizing provider or within the authorizing provider's specialty.  See \"Patient Info\" tab in inbasket, or \"Choose Columns\" in Meds & Orders section of the refill encounter.           Passed - Normal TSH on file in past 12 months    Recent Labs   Lab Test  06/18/18   1626   TSH  2.20              simvastatin (ZOCOR) 20 MG tablet [Pharmacy Med Name: SIMVASTATIN TABS 20MG] 90 tablet 3    Last Written Prescription Date:  09/14/17  Last Fill Quantity: 90 tab,  # refills: 3   Last office visit: 6/18/2018 with prescribing provider:     Future Office Visit:     Sig: TAKE 1 TABLET AT BEDTIME    Statins Protocol Passed    9/11/2018 12:32 AM       Passed - LDL on file in past 12 months    Recent Labs   Lab Test  09/15/17   0917   LDL  46            Passed - No abnormal creatine kinase in past 12 months    No lab results found.            Passed - Recent (12 mo) or future (30 days) visit within the authorizing provider's specialty    Patient had office visit in the last 12 months or has a visit in the next 30 days with authorizing provider or within the authorizing provider's specialty.  See \"Patient Info\" tab in inbasket, or \"Choose Columns\" in Meds & Orders section of the refill encounter.           Passed - Patient is age 18 or older          "

## 2018-09-24 ENCOUNTER — OFFICE VISIT (OUTPATIENT)
Dept: INTERNAL MEDICINE | Facility: CLINIC | Age: 83
End: 2018-09-24
Payer: MEDICARE

## 2018-09-24 VITALS
HEIGHT: 70 IN | WEIGHT: 200.4 LBS | BODY MASS INDEX: 28.69 KG/M2 | RESPIRATION RATE: 15 BRPM | HEART RATE: 60 BPM | OXYGEN SATURATION: 97 % | TEMPERATURE: 98.2 F | DIASTOLIC BLOOD PRESSURE: 62 MMHG | SYSTOLIC BLOOD PRESSURE: 106 MMHG

## 2018-09-24 DIAGNOSIS — Z23 NEED FOR PROPHYLACTIC VACCINATION AND INOCULATION AGAINST INFLUENZA: ICD-10-CM

## 2018-09-24 DIAGNOSIS — L91.8 SKIN TAG: ICD-10-CM

## 2018-09-24 DIAGNOSIS — E78.5 HYPERLIPIDEMIA LDL GOAL <100: ICD-10-CM

## 2018-09-24 DIAGNOSIS — I10 ESSENTIAL HYPERTENSION: ICD-10-CM

## 2018-09-24 DIAGNOSIS — Z12.11 COLON CANCER SCREENING: ICD-10-CM

## 2018-09-24 DIAGNOSIS — E03.9 HYPOTHYROIDISM, UNSPECIFIED TYPE: ICD-10-CM

## 2018-09-24 DIAGNOSIS — Z00.00 MEDICARE ANNUAL WELLNESS VISIT, SUBSEQUENT: Primary | ICD-10-CM

## 2018-09-24 DIAGNOSIS — I63.81 LACUNAR INFARCTION (H): ICD-10-CM

## 2018-09-24 DIAGNOSIS — Z23 NEED FOR PROPHYLACTIC VACCINATION WITH TETANUS-DIPHTHERIA (TD): ICD-10-CM

## 2018-09-24 PROCEDURE — 11200 RMVL SKIN TAGS UP TO&INC 15: CPT | Performed by: INTERNAL MEDICINE

## 2018-09-24 PROCEDURE — G0439 PPPS, SUBSEQ VISIT: HCPCS | Performed by: INTERNAL MEDICINE

## 2018-09-24 RX ORDER — SIMVASTATIN 20 MG
20 TABLET ORAL AT BEDTIME
Qty: 90 TABLET | Refills: 3 | Status: SHIPPED | OUTPATIENT
Start: 2018-09-24 | End: 2019-10-07

## 2018-09-24 RX ORDER — AMLODIPINE BESYLATE 5 MG/1
5 TABLET ORAL DAILY
Qty: 90 TABLET | Refills: 3 | Status: SHIPPED | OUTPATIENT
Start: 2018-09-24 | End: 2019-09-01

## 2018-09-24 RX ORDER — LEVOTHYROXINE SODIUM 137 UG/1
137 TABLET ORAL DAILY
Qty: 90 TABLET | Refills: 3 | Status: SHIPPED | OUTPATIENT
Start: 2018-09-24 | End: 2019-09-01

## 2018-09-24 ASSESSMENT — ACTIVITIES OF DAILY LIVING (ADL)
I_NEED_ASSISTANCE_FOR_THE_FOLLOWING_DAILY_ACTIVITIES:: NO ASSISTANCE IS NEEDED
CURRENT_FUNCTION: NO ASSISTANCE NEEDED

## 2018-09-24 NOTE — LETTER
My Depression Action Plan  Name: Abundio Beckett   Date of Birth 1935  Date: 9/24/2018    My doctor: Colton Chris   My clinic: [address] [address] [phone]          GREEN    ZONE   Good Control    What it looks like:     Things are going generally well. You have normal up s and down s. You may even feel depressed from time to time, but bad moods usually last less than a day.   What you need to do:  1. Continue to care for yourself (see self care plan)  2. Check your depression survival kit and update it as needed  3. Follow your physician s recommendations including any medication.  4. Do not stop taking medication unless you consult with your physician first.           YELLOW         ZONE Getting Worse    What it looks like:     Depression is starting to interfere with your life.     It may be hard to get out of bed; you may be starting to isolate yourself from others.    Symptoms of depression are starting to last most all day and this has happened for several days.     You may have suicidal thoughts but they are not constant.   What you need to do:     1. Call your care team, your response to treatment will improve if you keep your care team informed of your progress. Yellow periods are signs an adjustment may need to be made.     2. Continue your self-care, even if you have to fake it!    3. Talk to someone in your support network    4. Open up your depression survival kit           RED    ZONE Medical Alert - Get Help    What it looks like:     Depression is seriously interfering with your life.     You may experience these or other symptoms: You can t get out of bed most days, can t work or engage in other necessary activities, you have trouble taking care of basic hygiene, or basic responsibilities, thoughts of suicide or death that will not go away, self-injurious behavior.     What you need to do:  1. Call your care  team and request a same-day appointment. If they are not available (weekends or after hours) call your local crisis line, emergency room or 911.            Depression Self Care Plan / Survival Kit    Self-Care for Depression  Here s the deal. Your body and mind are really not as separate as most people think.  What you do and think affects how you feel and how you feel influences what you do and think. This means if you do things that people who feel good do, it will help you feel better.  Sometimes this is all it takes.  There is also a place for medication and therapy depending on how severe your depression is, so be sure to consult with your medical provider and/ or Behavioral Health Consultant if your symptoms are worsening or not improving.     In order to better manage my stress, I will:    Exercise  Get some form of exercise, every day. This will help reduce pain and release endorphins, the  feel good  chemicals in your brain. This is almost as good as taking antidepressants!  This is not the same as joining a gym and then never going! (they count on that by the way ) It can be as simple as just going for a walk or doing some gardening, anything that will get you moving.      Hygiene   Maintain good hygiene (Get out of bed in the morning, Make your bed, Brush your teeth, Take a shower, and Get dressed like you were going to work, even if you are unemployed).  If your clothes don't fit try to get ones that do.    Diet  I will strive to eat foods that are good for me, drink plenty of water, and avoid excessive sugar, caffeine, alcohol, and other mood-altering substances.  Some foods that are helpful in depression are: complex carbohydrates, B vitamins, flaxseed, fish or fish oil, fresh fruits and vegetables.    Psychotherapy  I agree to participate in Individual Therapy (if recommended).    Medication  If prescribed medications, I agree to take them.  Missing doses can result in serious side effects.  I  understand that drinking alcohol, or other illicit drug use, may cause potential side effects.  I will not stop my medication abruptly without first discussing it with my provider.    Staying Connected With Others  I will stay in touch with my friends, family members, and my primary care provider/team.    Use your imagination  Be creative.  We all have a creative side; it doesn t matter if it s oil painting, sand castles, or mud pies! This will also kick up the endorphins.    Witness Beauty  (AKA stop and smell the roses) Take a look outside, even in mid-winter. Notice colors, textures. Watch the squirrels and birds.     Service to others  Be of service to others.  There is always someone else in need.  By helping others we can  get out of ourselves  and remember the really important things.  This also provides opportunities for practicing all the other parts of the program.    Humor  Laugh and be silly!  Adjust your TV habits for less news and crime-drama and more comedy.    Control your stress  Try breathing deep, massage therapy, biofeedback, and meditation. Find time to relax each day.     My support system    Clinic Contact:  Phone number:    Contact 1:  Phone number:    Contact 2:  Phone number:    Orthodox/:  Phone number:    Therapist:  Phone number:    Local crisis center:    Phone number:    Other community support:  Phone number:

## 2018-09-24 NOTE — PROGRESS NOTES
SUBJECTIVE:   Abundio Beckett is a 82 year old male who presents for Preventive Visit.  Are you in the first 12 months of your Medicare Part B coverage?  No    Answers for HPI/ROS submitted by the patient on 9/24/2018   Annual Exam:  Getting at least 3 servings of Calcium per day:: Yes  Bi-annual eye exam:: Yes  Dental care twice a year:: Yes  Sleep apnea or symptoms of sleep apnea:: None  Diet:: Regular (no restrictions)  Taking medications regularly:: Yes  Medication side effects:: None  Additional concerns today:: No  Activities of Daily Living: no assistance needed  Home safety: no safety concerns identified  Hearing Impairment:: no hearing concerns  PHQ-2 Score: 1          Ability to successfully perform activities of daily living: Yes, no assistance needed    Home safety:  none identified     Hearing impairment: No    Fall risk:  Fall Risk Assessment not completed.      PROBLEMS TO ADD ON...  1. Skin tag on chest- patient states bothersome     Reviewed and updated as needed this visit by clinical staff  Tobacco  Allergies  Meds  Problems  Med Hx  Surg Hx  Fam Hx  Soc Hx        Reviewed and updated as needed this visit by Provider        Social History   Substance Use Topics     Smoking status: Never Smoker     Smokeless tobacco: Never Used     Alcohol use Yes      Comment: rare       If you drink alcohol do you typically have >3 drinks per day or >7 drinks per week? No                        Today's PHQ-2 Score:   PHQ-2 ( 1999 [employer]) 6/18/2018 9/14/2017   Q1: Little interest or pleasure in doing things 0 1   Q2: Feeling down, depressed or hopeless 0 1   PHQ-2 Score 0 2   Q1: Little interest or pleasure in doing things - Several days   Q2: Feeling down, depressed or hopeless - Several days   PHQ-2 Score - 2       Do you feel safe in your environment - Yes    Do you have a Health Care Directive?: No: Advance care planning was reviewed with patient; patient declined at this time.    Current providers  "sharing in care for this patient include:   Patient Care Team:  Colton Chris MD as PCP - General    The following health maintenance items are reviewed in Epic and correct as of today:  Health Maintenance   Topic Date Due     PHQ-9 Q6 MONTHS  11/22/1953     TETANUS IMMUNIZATION (SYSTEM ASSIGNED)  08/26/2018     INFLUENZA VACCINE (1) 09/01/2018     MEDICARE ANNUAL WELLNESS VISIT  09/14/2018     FALL RISK ASSESSMENT  09/14/2018     TSH Q1 YEAR  06/18/2019     DEPRESSION ACTION PLAN Q1 YR  09/24/2019     ADVANCE DIRECTIVE PLANNING Q5 YRS  11/28/2021     PNEUMOCOCCAL  Completed       Immunization History   Administered Date(s) Administered     HEPA 06/05/2000, 06/11/2002     Influenza (H1N1) 12/21/2009     Influenza (High Dose) 3 valent vaccine 10/01/2015, 09/07/2016, 09/14/2017     Influenza (IIV3) PF 11/22/2000, 09/25/2010, 09/03/2011     LYMERIX 05/12/1999, 06/24/1999, 06/02/2000     Pneumo Conj 13-V (2010&after) 06/02/2016     Pneumococcal 23 valent 10/11/1999, 05/22/2006     Poliovirus, inactivated (IPV) 06/05/2000     TD (ADULT, 7+) 01/13/1997, 08/26/2008     Typhoid IM 06/13/2002     Yellow Fever 06/13/2002     Zoster vaccine, live 07/16/2009         ROS:  CONSTITUTIONAL: NEGATIVE for fever, chills, change in weight  ENT/MOUTH: NEGATIVE for ear, mouth and throat problems  RESP: NEGATIVE for significant cough or SOB  CV: NEGATIVE for chest pain, palpitations or peripheral edema  GI: NEGATIVE for nausea, abdominal pain, heartburn, or change in bowel habits  : NEGATIVE for frequency, dysuria, or hematuria  MUSCULOSKELETAL: NEGATIVE for significant arthralgias or myalgia  HEME: NEGATIVE for bleeding problems  PSYCHIATRIC: NEGATIVE for changes in mood or affect    OBJECTIVE:   /62  Pulse 60  Temp 98.2  F (36.8  C) (Oral)  Resp 15  Ht 5' 10\" (1.778 m)  Wt 200 lb 6.4 oz (90.9 kg)  SpO2 97%  BMI 28.75 kg/m2 Estimated body mass index is 28.74 kg/(m^2) as calculated from the following:    Height as of " "6/18/18: 5' 10\" (1.778 m).    Weight as of 6/18/18: 200 lb 4.8 oz (90.9 kg).  EXAM:   GENERAL: healthy, alert and no distress  EYES: Eyes grossly normal to inspection, PERRL and conjunctivae and sclerae normal  HENT: ear canals and TM's normal, nose and mouth without ulcers or lesions  NECK: no adenopathy, no asymmetry, masses, or scars and thyroid normal to palpation  Small skin tag mid upper chest, liquid nitrogen applied times 2 as well as 2 seb. Keratosis noted right upper back  RESP: lungs clear to auscultation - no rales, rhonchi or wheezes  CV: regular rate and rhythm, normal S1 S2, no S3 or S4, no click or rub, no peripheral edema and peripheral pulses strong  ABDOMEN: soft, nontender, no hepatosplenomegaly, no masses and bowel sounds normal  MS: no gross musculoskeletal defects noted, no edema  NEURO: Normal strength and tone, mentation intact and speech normal  PSYCH: mentation appears normal, affect normal/bright      ASSESSMENT / PLAN:   1. Medicare annual wellness visit, subsequent  Advised update vaccination to include shingles shot  - Hemoglobin; Future  - Comprehensive metabolic panel; Future  - Lipid Profile; Future  - Fecal colorectal cancer screen (FIT); Future    2. Lacunar infarction (H)  Stable at present time without any residual symptom  - simvastatin (ZOCOR) 20 MG tablet; Take 1 tablet (20 mg) by mouth At Bedtime  Dispense: 90 tablet; Refill: 3    3. Hyperlipidemia LDL goal <100  Labs ordered as fast  - simvastatin (ZOCOR) 20 MG tablet; Take 1 tablet (20 mg) by mouth At Bedtime  Dispense: 90 tablet; Refill: 3  - Lipid Profile; Future    4. Essential hypertension  Stable on current therapy without evidence of for the  - amLODIPine (NORVASC) 5 MG tablet; Take 1 tablet (5 mg) by mouth daily  Dispense: 90 tablet; Refill: 3  - Comprehensive metabolic panel; Future    5. Hypothyroidism, unspecified type  TSH   Date Value Ref Range Status   06/18/2018 2.20 0.40 - 4.00 mU/L Final     - levothyroxine " "(SYNTHROID/LEVOTHROID) 137 MCG tablet; Take 1 tablet (137 mcg) by mouth daily  Dispense: 90 tablet; Refill: 3    6. Need for prophylactic vaccination and inoculation against influenza  Recommended the vaccination according    7. Need for prophylactic vaccination with tetanus-diphtheria (TD)  Recommended updated in pharmacy due to insurance cover    8. Colon cancer screening  Routine fit test ordered  - Fecal colorectal cancer screen (FIT); Future    9. Skin tag  Liquid nitrogen was applied to the area ×2 with local wound care discussed  - REMOVAL OF SKIN TAGS, FIRST 15    End of Life Planning:  Patient currently has an advanced directive: No.  I have verified the patient's ablity to prepare an advanced directive/make health care decisions.  Literature was provided to assist patient in preparing an advanced directive.    COUNSELING:  Reviewed preventive health counseling, as reflected in patient instructions       Regular exercise       Healthy diet/nutrition    BP Readings from Last 1 Encounters:   06/18/18 124/78     Estimated body mass index is 28.74 kg/(m^2) as calculated from the following:    Height as of 6/18/18: 5' 10\" (1.778 m).    Weight as of 6/18/18: 200 lb 4.8 oz (90.9 kg).           reports that he has never smoked. He has never used smokeless tobacco.      Appropriate preventive services were discussed with this patient, including applicable screening as appropriate for cardiovascular disease, diabetes, osteopenia/osteoporosis, and glaucoma.  As appropriate for age/gender, discussed screening for colorectal cancer, prostate cancer, breast cancer, and cervical cancer. Checklist reviewing preventive services available has been given to the patient.    Reviewed patients plan of care and provided an AVS. The Basic Care Plan (routine screening as documented in Health Maintenance) for Abundio meets the Care Plan requirement. This Care Plan has been established and reviewed with the Patient.    Counseling " Resources:  ATP IV Guidelines  Pooled Cohorts Equation Calculator  Breast Cancer Risk Calculator  FRAX Risk Assessment  ICSI Preventive Guidelines  Dietary Guidelines for Americans, 2010  USDA's MyPlate  ASA Prophylaxis  Lung CA Screening    Colton Chris MD  [facility]    THE MEDICATION LIST HAS BEEN FULLY RECONCILED BY THE M.D. AND THE NURSING STAFF.

## 2018-09-24 NOTE — PATIENT INSTRUCTIONS
Services Typically covered by Medicare Recommended Completed   Vaccines    Pneumonoccol    Influenza    Hepatitis B (if medium/high risk)     Once for patients after age 65    Yearly  Medium/high risk factors:    End Stage Kidney Disease    Hemophiliacs who received Factor XIII or IX concentrates    Clients of institutions for developmentally disabled    Persons who live in same house as a Hepatitis B carrier    Homosexual men    Illicit injectable drug users    Health care workers     Mammogram Covered: One-time screen between age 35-39, annually for age 40+     Pap and Pelvic Exam Covered: Annually if  high risk,  or childbearing age with abnormal Pap in last 3 years.  Q24 months for all other women     Prostate Cancer Screening    Digital rectal exam    PSA Covered: Annually for all men > age 50     Corolrectal Cancer Screening Screening colonoscopy every 10 years, more often for high risk patients     Diabetes Self-Management Training Requires referral by treating physician for patient with diabetes     Diabetes Screening    Fasting blood sugar or glucose tolerance test   Once yearly, twice yearly if prediabetic     Cardiovascular Screening Blood Tests    Total Cholesterol    HDL    Triglycerides Every 5 years     Medical Nutrition Therapy for Diabetes or Renal Disease Requires referral by treating physician for patient with diabetes or kidney disease     Glaucoma Screening Annually for patients with one of the following risk factors:    Diabetes Mellitus    Family history of Glaucoma    -American age 50 and over    -American age 65 and over     Bone Mass Measurement Every 24 months if one of the following risk factors:    Estrogen deficiency    Vertebral abnormalities on x-ray indicative of Osteoporosis, Osteopenia, or Vertebral fracture    Receiving/expected to receive the equivalent of at least 5 mg of Prednisone per day for > 3 months    Hyperparathyroidism    Patient being monitored for  response to Osteoporosis Therapy     One-time AAA screen  Must be ordered as part of Medicare IPPE   Any patient with a family history of AAA    Males Age 65-75, with history of smoking at least 100 cigarettes in lifetime     Smoking Cessation Counseling Beneficiaries who use tobacco are eligible to receive 2 cessation attempts per year; each attempt includes maximum of 4 sessions     HIV Screening Annually for beneficiaries at increased risk:       Increased risk for HIV infection is defined in the  National Coverage Determinations (NCD) Manual,  Publication 100-03 Sections 190.14 (diagnostic) and 210.7 (screening). See http://www.cms.gov/manuals/downloads/pfq127n4_Jmcu9.pdf and http://www.cms.gov/manuals/downloads/rfd941c9_Wdos9.pdf on the Internet.  Three times per pregnancy for beneficiaries who are pregnant.     Future Annual Wellness Visit Annually, for all beneficiaries.

## 2018-09-24 NOTE — MR AVS SNAPSHOT
After Visit Summary   9/24/2018    Abundio Beckett    MRN: [id number]           Patient Information     Date Of Birth          1935        Visit Information        Provider Department      9/24/2018 10:00 AM Colton Chris MD [facility]        Today's Diagnoses     Medicare annual wellness visit, subsequent    -  1    Lacunar infarction (H)        Hyperlipidemia LDL goal <100        Essential hypertension        Hypothyroidism, unspecified type        Need for prophylactic vaccination and inoculation against influenza        Need for prophylactic vaccination with tetanus-diphtheria (TD)        Colon cancer screening        Skin tag          Care Instructions          Services Typically covered by Medicare Recommended Completed   Vaccines    Pneumonoccol    Influenza    Hepatitis B (if medium/high risk)     Once for patients after age 65    Yearly  Medium/high risk factors:    End Stage Kidney Disease    Hemophiliacs who received Factor XIII or IX concentrates    Clients of institutions for developmentally disabled    Persons who live in same house as a Hepatitis B carrier    Homosexual men    Illicit injectable drug users    Health care workers     Mammogram Covered: One-time screen between age 35-39, annually for age 40+     Pap and Pelvic Exam Covered: Annually if  high risk,  or childbearing age with abnormal Pap in last 3 years.  Q24 months for all other women     Prostate Cancer Screening    Digital rectal exam    PSA Covered: Annually for all men > age 50     Corolrectal Cancer Screening Screening colonoscopy every 10 years, more often for high risk patients     Diabetes Self-Management Training Requires referral by treating physician for patient with diabetes     Diabetes Screening    Fasting blood sugar or glucose tolerance test   Once yearly, twice yearly if prediabetic     Cardiovascular Screening Blood Tests    Total Cholesterol    HDL    Triglycerides Every  5 years     Medical Nutrition Therapy for Diabetes or Renal Disease Requires referral by treating physician for patient with diabetes or kidney disease     Glaucoma Screening Annually for patients with one of the following risk factors:    Diabetes Mellitus    Family history of Glaucoma    -American age 50 and over    -American age 65 and over     Bone Mass Measurement Every 24 months if one of the following risk factors:    Estrogen deficiency    Vertebral abnormalities on x-ray indicative of Osteoporosis, Osteopenia, or Vertebral fracture    Receiving/expected to receive the equivalent of at least 5 mg of Prednisone per day for > 3 months    Hyperparathyroidism    Patient being monitored for response to Osteoporosis Therapy     One-time AAA screen  Must be ordered as part of Medicare IPPE   Any patient with a family history of AAA    Males Age 65-75, with history of smoking at least 100 cigarettes in lifetime     Smoking Cessation Counseling Beneficiaries who use tobacco are eligible to receive 2 cessation attempts per year; each attempt includes maximum of 4 sessions     HIV Screening Annually for beneficiaries at increased risk:       Increased risk for HIV infection is defined in the  National Coverage Determinations (NCD) Manual,  Publication 100-03 Sections 190.14 (diagnostic) and 210.7 (screening). See http://www.cms.gov/manuals/downloads/upy024l4_Tsla9.pdf and http://www.cms.gov/manuals/downloads/oqi189d5_Ahxt2.pdf on the Internet.  Three times per pregnancy for beneficiaries who are pregnant.     Future Annual Wellness Visit Annually, for all beneficiaries.               Follow-ups after your visit        Follow-up notes from your care team     Return if symptoms worsen or fail to improve.      Your next 10 appointments already scheduled     Sep 25, 2018  9:45 AM [place]   LAB with [facility]   [facility]    [address] [place] [address]  [facility] [id number] [phone]           Please do not eat 10-12 hours before your appointment if you are coming in fasting for labs on lipids, cholesterol, or glucose (sugar). This does not apply to pregnant women. Water, hot tea and black coffee (with nothing added) are okay. Do not drink other fluids, diet soda or chew gum.              Future tests that were ordered for you today     Open Future Orders        Priority Expected Expires Ordered    Hemoglobin Routine 9/24/2018 9/30/2018 9/24/2018    Comprehensive metabolic panel Routine 9/24/2018 9/30/2018 9/24/2018    Lipid Profile Routine 9/24/2018 9/30/2018 9/24/2018    Fecal colorectal cancer screen (FIT) Routine 9/24/2018 9/30/2018 9/24/2018            Who to contact     If you have questions or need follow up information about today's clinic visit or your schedule please contact [facility] directly at [phone].  Normal or non-critical lab and imaging results will be communicated to you by [employer], letter or phone within 4 business days after the clinic has received the results. If you do not hear from us within 7 days, please contact the clinic through [employer] or phone. If you have a critical or abnormal lab result, we will notify you by phone as soon as possible.  Submit refill requests through [employer] or call your pharmacy and they will forward the refill request to us. Please allow 3 business days for your refill to be completed.          Additional Information About Your Visit        [employer] Information     [employer] gives you secure access to your electronic health record. If you see a primary care provider, you can also send messages to your care team and make appointments. If you have questions, please call your primary care clinic.  If you do not have a primary care provider, please call [phone] and they will assist you.        Care EveryWhere ID     This is your Care EveryWhere ID. This could be used  "by other organizations to access your [facility] medical records  [id number]        Your Vitals Were     Pulse Temperature Respirations Height Pulse Oximetry BMI (Body Mass Index)    60 98.2  F (36.8  C) (Oral) 15 5' 10\" (1.778 m) 97% 28.75 kg/m2       Blood Pressure from Last 3 Encounters:   09/24/18 106/62   06/18/18 124/78   09/14/17 133/77    Weight from Last 3 Encounters:   09/24/18 200 lb 6.4 oz (90.9 kg)   06/18/18 200 lb 4.8 oz (90.9 kg)   09/14/17 206 lb (93.4 kg)              We Performed the Following     DEPRESSION ACTION PLAN (DAP)     REMOVAL OF SKIN TAGS, FIRST 15          Today's Medication Changes          These changes are accurate as of 9/24/18 10:53 AM.  If you have any questions, ask your nurse or doctor.               These medicines have changed or have updated prescriptions.        Dose/Directions    amLODIPine 5 MG tablet   Commonly known as:  NORVASC   This may have changed:  See the new instructions.   Used for:  Essential hypertension   Changed by:  Colton Chris MD        Dose:  5 mg   Take 1 tablet (5 mg) by mouth daily   Quantity:  90 tablet   Refills:  3       levothyroxine 137 MCG tablet   Commonly known as:  SYNTHROID/LEVOTHROID   This may have changed:  See the new instructions.   Used for:  Hypothyroidism, unspecified type   Changed by:  Colton Chris MD        Dose:  137 mcg   Take 1 tablet (137 mcg) by mouth daily   Quantity:  90 tablet   Refills:  3       simvastatin 20 MG tablet   Commonly known as:  ZOCOR   This may have changed:  See the new instructions.   Used for:  Hyperlipidemia LDL goal <100, Lacunar infarction (H)   Changed by:  Colton Chris MD        Dose:  20 mg   Take 1 tablet (20 mg) by mouth At Bedtime   Quantity:  90 tablet   Refills:  3            Where to get your medicines      These medications were sent to [facility]  [address] [address]     Phone:  [phone]     " amLODIPine 5 MG tablet    levothyroxine 137 MCG tablet    simvastatin 20 MG tablet                Primary Care Provider Office Phone # Fax #    Colton Chris [phone] [address] [facility] [address]        Equal Access to Services     ARBENELÍAS BELÉN : Hadii aad ku hadasho Soomaali, waaxda luqadaha, qaybta kaalmada adeegyada, waxay idiin hayjosen adejeannie dhillon ladianamaldonado ford. So [facility] [phone].    ATENCIÓN: Si habla español, tiene a livingston disposición servicios gratuitos de asistencia lingüística. Llame al [phone].    We comply with applicable federal civil rights laws and [place] laws. We do not discriminate on the basis of race, color, national origin, age, disability, sex, sexual orientation, or gender identity.            Thank you!     Thank you for choosing [facility]  for your care. Our goal is always to provide you with excellent care. Hearing back from our patients is one way we can continue to improve our services. Please take a few minutes to complete the written survey that you may receive in the mail after your visit with us. Thank you!             Your Updated Medication List - Protect others around you: Learn how to safely use, store and throw away your medicines at www.disposemymeds.org.          This list is accurate as of 9/24/18 10:53 AM.  Always use your most recent med list.                   Brand Name Dispense Instructions for use Diagnosis    amLODIPine 5 MG tablet    NORVASC    90 tablet    Take 1 tablet (5 mg) by mouth daily    Essential hypertension       aspirin 325 MG EC tablet     100 tablet    Take 1 tablet by mouth daily.    Diplopias, intermittent, Lacunar infarction (H)       BETIMOL OP      qd        CENTRUM SILVER per tablet      1 TABLET DAILY        GLUCOSAMINE CHONDROITIN Tabs           LAXATIVE PO           levothyroxine 137 MCG tablet    SYNTHROID/LEVOTHROID    90 tablet    Take 1 tablet (137 mcg) by mouth daily     Hypothyroidism, unspecified type       simvastatin 20 MG tablet    ZOCOR    90 tablet    Take 1 tablet (20 mg) by mouth At Bedtime    Hyperlipidemia LDL goal <100, Lacunar infarction (H)       XALATAN 0.005 % ophthalmic solution   Generic drug:  latanoprost      qd

## 2018-09-25 DIAGNOSIS — I10 ESSENTIAL HYPERTENSION: ICD-10-CM

## 2018-09-25 DIAGNOSIS — Z00.00 MEDICARE ANNUAL WELLNESS VISIT, SUBSEQUENT: ICD-10-CM

## 2018-09-25 DIAGNOSIS — E78.5 HYPERLIPIDEMIA LDL GOAL <100: ICD-10-CM

## 2018-09-25 DIAGNOSIS — Z12.11 COLON CANCER SCREENING: ICD-10-CM

## 2018-09-25 LAB
ALBUMIN SERPL-MCNC: 3.8 G/DL (ref 3.4–5)
ALP SERPL-CCNC: 64 U/L (ref 40–150)
ALT SERPL W P-5'-P-CCNC: 16 U/L (ref 0–70)
ANION GAP SERPL CALCULATED.3IONS-SCNC: 7 MMOL/L (ref 3–14)
AST SERPL W P-5'-P-CCNC: 13 U/L (ref 0–45)
BILIRUB SERPL-MCNC: 0.9 MG/DL (ref 0.2–1.3)
BUN SERPL-MCNC: 16 MG/DL (ref 7–30)
CALCIUM SERPL-MCNC: 9 MG/DL (ref 8.5–10.1)
CHLORIDE SERPL-SCNC: 106 MMOL/L (ref 94–109)
CHOLEST SERPL-MCNC: 105 MG/DL
CO2 SERPL-SCNC: 29 MMOL/L (ref 20–32)
CREAT SERPL-MCNC: 1.11 MG/DL (ref 0.66–1.25)
GFR SERPL CREATININE-BSD FRML MDRD: 63 ML/MIN/1.7M2
GLUCOSE SERPL-MCNC: 104 MG/DL (ref 70–99)
HDLC SERPL-MCNC: 45 MG/DL
HGB BLD-MCNC: 14.7 G/DL (ref 13.3–17.7)
LDLC SERPL CALC-MCNC: 41 MG/DL
NONHDLC SERPL-MCNC: 60 MG/DL
POTASSIUM SERPL-SCNC: 4.4 MMOL/L (ref 3.4–5.3)
PROT SERPL-MCNC: 7.2 G/DL (ref 6.8–8.8)
SODIUM SERPL-SCNC: 142 MMOL/L (ref 133–144)
TRIGL SERPL-MCNC: 97 MG/DL

## 2018-09-25 PROCEDURE — 80053 COMPREHEN METABOLIC PANEL: CPT | Performed by: INTERNAL MEDICINE

## 2018-09-25 PROCEDURE — 36415 COLL VENOUS BLD VENIPUNCTURE: CPT | Performed by: INTERNAL MEDICINE

## 2018-09-25 PROCEDURE — 85018 HEMOGLOBIN: CPT | Performed by: INTERNAL MEDICINE

## 2018-09-25 PROCEDURE — 80061 LIPID PANEL: CPT | Performed by: INTERNAL MEDICINE

## 2018-09-25 ASSESSMENT — PATIENT HEALTH QUESTIONNAIRE - PHQ9: SUM OF ALL RESPONSES TO PHQ QUESTIONS 1-9: 2

## 2018-09-25 NOTE — LETTER
[facility]  [address] [address]  [phone]      9/25/2018       Abundio Beckett  [address]   [place] [facility] [address]        Dear Abundio,    I am pleased to inform you that your routine blood work including your hemoglobin, sodium, potassium, calcium, kidney and liver function tests are all normal.    Your cholesterol looks good and I would not change anything at this point but would repeat your labs in 12 months.    Your blood sugar function tests are slightly abnormal and elevated and should be rechecked here in the clinic in 12 months with a follow-up visit with me, fasting.  I will look forward to seeing you at that time and please call to make an appointment.  In the meantime, please work on your diet limiting your carbohydrates and getting some good, regular exercise.      Sincerely,      Colton Chris MD  Internal Medicine

## 2018-10-03 PROCEDURE — 82274 ASSAY TEST FOR BLOOD FECAL: CPT | Performed by: INTERNAL MEDICINE

## 2018-10-07 LAB — HEMOCCULT STL QL IA: NEGATIVE

## 2018-10-08 DIAGNOSIS — Z00.00 MEDICARE ANNUAL WELLNESS VISIT, SUBSEQUENT: ICD-10-CM

## 2018-10-08 DIAGNOSIS — Z12.11 COLON CANCER SCREENING: ICD-10-CM

## 2019-05-06 ENCOUNTER — E-VISIT (OUTPATIENT)
Dept: INTERNAL MEDICINE | Facility: CLINIC | Age: 84
End: 2019-05-06
Payer: COMMERCIAL

## 2019-05-06 ENCOUNTER — TELEPHONE (OUTPATIENT)
Dept: INTERNAL MEDICINE | Facility: CLINIC | Age: 84
End: 2019-05-06

## 2019-05-06 DIAGNOSIS — F32.0 MILD MAJOR DEPRESSION (H): Primary | ICD-10-CM

## 2019-05-06 ASSESSMENT — ANXIETY QUESTIONNAIRES
2. NOT BEING ABLE TO STOP OR CONTROL WORRYING: MORE THAN HALF THE DAYS
1. FEELING NERVOUS, ANXIOUS, OR ON EDGE: NEARLY EVERY DAY
7. FEELING AFRAID AS IF SOMETHING AWFUL MIGHT HAPPEN: MORE THAN HALF THE DAYS
6. BECOMING EASILY ANNOYED OR IRRITABLE: SEVERAL DAYS
3. WORRYING TOO MUCH ABOUT DIFFERENT THINGS: MORE THAN HALF THE DAYS
4. TROUBLE RELAXING: MORE THAN HALF THE DAYS
7. FEELING AFRAID AS IF SOMETHING AWFUL MIGHT HAPPEN: MORE THAN HALF THE DAYS
5. BEING SO RESTLESS THAT IT IS HARD TO SIT STILL: MORE THAN HALF THE DAYS
GAD7 TOTAL SCORE: 14

## 2019-05-06 ASSESSMENT — PATIENT HEALTH QUESTIONNAIRE - PHQ9
10. IF YOU CHECKED OFF ANY PROBLEMS, HOW DIFFICULT HAVE THESE PROBLEMS MADE IT FOR YOU TO DO YOUR WORK, TAKE CARE OF THINGS AT HOME, OR GET ALONG WITH OTHER PEOPLE: NOT DIFFICULT AT ALL
SUM OF ALL RESPONSES TO PHQ QUESTIONS 1-9: 8
SUM OF ALL RESPONSES TO PHQ QUESTIONS 1-9: 8

## 2019-05-06 NOTE — TELEPHONE ENCOUNTER
Triage spoke with patient Dtr. ASHLEY Stinson on file. Informed of patient response today and recommendations below. Advised to have family member or close friends in [facility] to stay with patient tonight if at possible Pt. Dtr. Will have other sibling check on patient.    Crisis line information also discussed with dtr if needed. Even though patient denied SI/ SH.     Constance WATSON RN, BSN, PHN

## 2019-05-06 NOTE — TELEPHONE ENCOUNTER
Patient calling,     Reports he is grieving his spouse recently changing over to hospice care with essentially 1-2 days left of life. He is wondering if he can take his spouses old prescription of Risperidone? Triage advised against this and recommended an OV to discuss depression/ anxiety during this pivotal moment. Patient reports no SI or self harm but then 5-10 minutes into discussion admit he did try taking spouses Risperidone 30 minutes ago-.25mg dose (around 4:35pm) to see if this would help with anxiety.     Patient reports he is feeling ok. AxOx3. Conversation logical and coherent. Advised patient to remove medication from home since patient spouse in not at his residence.     Message handled by Nurse Triage with [facility] - provider name: Dr. Dougherty- low dose med. ok to monitor. Should be seen in clinic. [facility] /hospital/ [facility] if suicidal.    Patient expressed understanding. Denies SI or self harm he feels weepy and a little anxious. Would like something as needed to help him thorough this life change. Chart reviewed.     Patient reports he tried to call counselors back when referred in June 2018 but appointments were 3 months out so never consulted with therapist.      Advised patient to be seen.  Patient reports he is unable to see PCP tomorrow early afternoon d/t Dtr. Shantell arriving from [place] tomorrow and does not want to miss a moment with his spouse.     Triage walked patient through E-Visit as another option as patient reluctant to be seen in ER for PRN basis. Pt. Answered PHQ-9 and GRISEL:    PHQ-9 score:    PHQ-9 SCORE 5/6/2019   PHQ-9 Total Score MyChart 8 (Mild depression)   PHQ-9 Total Score 8     GRISEL-7 SCORE 5/6/2019   Total Score 14 (moderate anxiety)   Total Score 14         Advised to call 911 or be seen at [facility] for further eval if unable to control anxious thoughts. Can call [facility] Crisis line for further needs or discussion.     Pt expressed understanding and acceptance of the  plan.  Pt had no further questions at this time.  Advised can call back to clinic at any time with concerns.       Constance WATSON RN, BSN, PHN

## 2019-05-07 ENCOUNTER — TELEPHONE (OUTPATIENT)
Dept: INTERNAL MEDICINE | Facility: CLINIC | Age: 84
End: 2019-05-07

## 2019-05-07 ENCOUNTER — OFFICE VISIT (OUTPATIENT)
Dept: INTERNAL MEDICINE | Facility: CLINIC | Age: 84
End: 2019-05-07
Payer: MEDICARE

## 2019-05-07 VITALS
TEMPERATURE: 98.4 F | SYSTOLIC BLOOD PRESSURE: 116 MMHG | OXYGEN SATURATION: 99 % | BODY MASS INDEX: 29.55 KG/M2 | HEIGHT: 70 IN | DIASTOLIC BLOOD PRESSURE: 72 MMHG | RESPIRATION RATE: 15 BRPM | WEIGHT: 206.4 LBS | HEART RATE: 71 BPM

## 2019-05-07 DIAGNOSIS — I10 ESSENTIAL HYPERTENSION, BENIGN: ICD-10-CM

## 2019-05-07 DIAGNOSIS — F32.0 MILD MAJOR DEPRESSION (H): Primary | ICD-10-CM

## 2019-05-07 DIAGNOSIS — F32.0 MILD MAJOR DEPRESSION (H): ICD-10-CM

## 2019-05-07 PROCEDURE — 99214 OFFICE O/P EST MOD 30 MIN: CPT | Performed by: INTERNAL MEDICINE

## 2019-05-07 RX ORDER — CITALOPRAM HYDROBROMIDE 20 MG/1
20 TABLET ORAL DAILY
Qty: 90 TABLET | Refills: 1 | Status: SHIPPED | OUTPATIENT
Start: 2019-05-07 | End: 2019-05-07

## 2019-05-07 RX ORDER — CITALOPRAM HYDROBROMIDE 20 MG/1
20 TABLET ORAL DAILY
Qty: 90 TABLET | Refills: 1 | Status: SHIPPED | OUTPATIENT
Start: 2019-05-07 | End: 2020-02-05

## 2019-05-07 ASSESSMENT — PATIENT HEALTH QUESTIONNAIRE - PHQ9
SUM OF ALL RESPONSES TO PHQ QUESTIONS 1-9: 8
SUM OF ALL RESPONSES TO PHQ QUESTIONS 1-9: 8
5. POOR APPETITE OR OVEREATING: MORE THAN HALF THE DAYS

## 2019-05-07 ASSESSMENT — ANXIETY QUESTIONNAIRES
GAD7 TOTAL SCORE: 14
6. BECOMING EASILY ANNOYED OR IRRITABLE: SEVERAL DAYS
7. FEELING AFRAID AS IF SOMETHING AWFUL MIGHT HAPPEN: MORE THAN HALF THE DAYS
GAD7 TOTAL SCORE: 14
3. WORRYING TOO MUCH ABOUT DIFFERENT THINGS: MORE THAN HALF THE DAYS
2. NOT BEING ABLE TO STOP OR CONTROL WORRYING: MORE THAN HALF THE DAYS
1. FEELING NERVOUS, ANXIOUS, OR ON EDGE: NEARLY EVERY DAY
5. BEING SO RESTLESS THAT IT IS HARD TO SIT STILL: MORE THAN HALF THE DAYS

## 2019-05-07 ASSESSMENT — MIFFLIN-ST. JEOR: SCORE: 1637.47

## 2019-05-07 NOTE — TELEPHONE ENCOUNTER
Pt calling this morning, , asking if it would be ok if he take another risperdone. Asked him who prescribes this, and says they are his wife's pills. Informed him that PCP advises appt. Dr. Chris did have same day appt today at 11am, informed pt, and appt scheduled.

## 2019-05-07 NOTE — TELEPHONE ENCOUNTER
Tray! Mr. Beckett came into the pharmacy and picked up his Celexa today , but his Insurance only allows 30 days a time .    Please send a new RX to [facility]    Thank you!    Fifi Henry CPhT   [facility]

## 2019-05-07 NOTE — PROGRESS NOTES
SUBJECTIVE:   Abundio Beckett is a 83 year old male who presents to clinic today for the following health issues:    See triage telephone encounter from 5/7/19    Reports he is grieving his spouse recently changing over to hospice care with essentially 1-2 days left of life. He is wondering if he can take his spouses old prescription of Risperidone? Triage advised against this and recommended an OV to discuss depression/ anxiety during this pivotal moment. Patient reports no SI or self harm but then 5-10 minutes into discussion admit he did try taking spouses Risperidone 30 minutes ago-.25mg dose (around 4:35pm) to see if this would help with anxiety.    Abnormal Mood Symptoms      Duration: 1 month     Description:  Depression: YES  Anxiety: YES  Panic attacks: no     Accompanying signs and symptoms: see PHQ-9 and GRISEL scores. Patient feels weepy and anxious- wishing he had someone to help him during this time     History (similar episodes/previous evaluation): None    Precipitating or alleviating factors: Wife currently end of life in hospice, told 1-2 day until passing     Therapies tried and outcome: Patient took one of wife's risperidone tablets 0.25 mg hoping it would help his anxiety       Additional history: as documented    Reviewed  and updated as needed this visit by clinical staff         Reviewed and updated as needed this visit by Provider         Patient Active Problem List   Diagnosis     Hypertrophy (benign) of prostate     Allergic rhinitis     Advance Care Planning     Hyperlipidemia LDL goal <100     Hyperglycemia     Lacunar infarction     Hypothyroidism, unspecified type     Essential hypertension, benign     Past Surgical History:   Procedure Laterality Date     C REMV PROSTATE,PERINEAL,RADICAL  2000     HC SHOULDER ARTHROSCOPY, DX         Social History     Tobacco Use     Smoking status: Never Smoker     Smokeless tobacco: Never Used   Substance Use Topics     Alcohol use: Yes     Comment:  "rare     No family history on file.      Current Outpatient Medications   Medication Sig Dispense Refill     citalopram (CELEXA) 20 MG tablet Take 1 tablet (20 mg) by mouth daily 90 tablet 1     amLODIPine (NORVASC) 5 MG tablet Take 1 tablet (5 mg) by mouth daily 90 tablet 3     aspirin 325 MG EC tablet Take 1 tablet by mouth daily. 100 tablet 3     BETIMOL OP qd       CENTRUM SILVER OR TABS 1 TABLET DAILY       levothyroxine (SYNTHROID/LEVOTHROID) 137 MCG tablet Take 1 tablet (137 mcg) by mouth daily 90 tablet 3     simvastatin (ZOCOR) 20 MG tablet Take 1 tablet (20 mg) by mouth At Bedtime 90 tablet 3     XALATAN 0.005 % OP SOLN qd       Allergies   Allergen Reactions     No Known Drug Allergies      BP Readings from Last 3 Encounters:   09/24/18 106/62   06/18/18 124/78   09/14/17 133/77    Wt Readings from Last 3 Encounters:   09/24/18 90.9 kg (200 lb 6.4 oz)   06/18/18 90.9 kg (200 lb 4.8 oz)   09/14/17 93.4 kg (206 lb)            ROS:  CONSTITUTIONAL: NEGATIVE for fever, chills, change in weight  ENT/MOUTH: NEGATIVE for ear, mouth and throat problems  RESP: NEGATIVE for significant cough or SOB  CV: NEGATIVE for chest pain, palpitations or peripheral edema  GI: NEGATIVE for nausea, abdominal pain, heartburn, or change in bowel habits  : NEGATIVE for frequency, dysuria, or hematuria  MUSCULOSKELETAL: NEGATIVE for significant arthralgias or myalgia  HEME: NEGATIVE for bleeding problems    OBJECTIVE:                                                    /72   Pulse 71   Temp 98.4  F (36.9  C) (Oral)   Resp 15   Ht 1.778 m (5' 10\")   Wt 93.6 kg (206 lb 6.4 oz)   SpO2 99%   BMI 29.62 kg/m    Body mass index is 29.62 kg/m .  GENERAL: alert and no distress  RESP: lungs clear to auscultation - no rales, no rhonchi, no wheezes  CV: regular rates and rhythm, normal S1 S2, no S3 or S4 and no murmur, no click or rub -  MS: extremities- no gross deformities noted, no edema  NEURO:  No focal changes  PSYCH: Alert " and oriented times 3; speech- coherent , normal rate and volume; able to articulate logical thoughts, able to abstract reason, no tangential thoughts, no hallucinations or delusions, affect- flat     ASSESSMENT/PLAN:                                                      (F32.0) Mild major depression (H)  (primary encounter diagnosis)  Comment: Reviewed options of treatment for patient in regards to counseling and therapy and combination thereof.  We will start oral therapy and referral sent for counseling  Plan: MENTAL HEALTH REFERRAL  - Adult; Outpatient         Treatment; Individual/Couples/Family/Group         Therapy/Health Psychology; [facility]: [facility] [phone]; We will         contact you to schedule the appointment or         please call with any questions, citalopram         (CELEXA) 20 MG tablet        I've explained to him that drugs of the SSRI class can have side effects such as weight gain, sexual dysfunction, insomnia, headache, nausea. These medications are generally effective at alleviating symptoms of anxiety and/or depression. Let me know if significant side effects do occur.    (I10) Essential hypertension, benign  Comment: stable on therapy  Plan:     See Patient Instructions    Colton Chris MD  [facility]

## 2019-05-08 ASSESSMENT — ANXIETY QUESTIONNAIRES: GAD7 TOTAL SCORE: 14

## 2019-09-01 DIAGNOSIS — I10 ESSENTIAL HYPERTENSION: ICD-10-CM

## 2019-09-01 DIAGNOSIS — E03.9 HYPOTHYROIDISM, UNSPECIFIED TYPE: ICD-10-CM

## 2019-09-01 NOTE — LETTER
[facility]  [address] [id number] [phone]            Abundio Beckett  [address]   [place] [address]        September 3, 2019    Dear Abundio,    While refilling your prescription today, we noticed that you are due to have labs drawn.  We will refill your prescription for 30 days, but a follow-up appointment must be made before any additional refills can be approved.     Taking care of your health is important to us and we look forward to seeing you in the near future.  Please call us at [phone] or [phone] (or use [employer]) to schedule an appointment.     Please disregard this notice if you have already made an appointment.    Sincerely,        [facility]

## 2019-09-02 NOTE — TELEPHONE ENCOUNTER
"Requested Prescriptions   Pending Prescriptions Disp Refills     levothyroxine (SYNTHROID/LEVOTHROID) 137 MCG tablet [Pharmacy Med Name: LEVOTHYROXINE  0.137MG  TAB] 90 tablet 3     Sig: TAKE 1 TABLET DAILY   Last Written Prescription Date:  9/24/2018  Last Fill Quantity: 90,  # refills: 3   Last Office Visit: 5/7/2019   Future Office Visit:         Thyroid Protocol Failed - 9/1/2019 10:35 AM        Failed - Normal TSH on file in past 12 months     Recent Labs   Lab Test 06/18/18  1626   TSH 2.20              Passed - Patient is 12 years or older        Passed - Recent (12 mo) or future (30 days) visit within the authorizing provider's specialty     Patient had office visit in the last 12 months or has a visit in the next 30 days with authorizing provider or within the authorizing provider's specialty.  See \"Patient Info\" tab in inbasket, or \"Choose Columns\" in Meds & Orders section of the refill encounter.              Passed - Medication is active on med list        amLODIPine (NORVASC) 5 MG tablet [Pharmacy Med Name: AMLODIPINE  5MG  TAB] 90 tablet 3     Sig: TAKE 1 TABLET DAILY   Last Written Prescription Date:  9/24/2018  Last Fill Quantity: 90,  # refills: 3   Last Office Visit: 5/7/2019   Future Office Visit:         Calcium Channel Blockers Protocol  Passed - 9/1/2019 10:35 AM        Passed - Blood pressure under 140/90 in past 12 months     BP Readings from Last 3 Encounters:   05/07/19 116/72   09/24/18 106/62   06/18/18 124/78                 Passed - Recent (12 mo) or future (30 days) visit within the authorizing provider's specialty     Patient had office visit in the last 12 months or has a visit in the next 30 days with authorizing provider or within the authorizing provider's specialty.  See \"Patient Info\" tab in inbasket, or \"Choose Columns\" in Meds & Orders section of the refill encounter.              Passed - Medication is active on med list        Passed - Patient is age 18 or older        " Passed - Normal serum creatinine on file in past 12 months     Recent Labs   Lab Test 09/25/18  0942  06/06/16  2106   CR 1.11   < >  --    CREAT  --   --  1.0    < > = values in this interval not displayed.

## 2019-09-03 RX ORDER — AMLODIPINE BESYLATE 5 MG/1
TABLET ORAL
Qty: 30 TABLET | Refills: 0 | Status: SHIPPED | OUTPATIENT
Start: 2019-09-03 | End: 2019-10-07

## 2019-09-03 RX ORDER — LEVOTHYROXINE SODIUM 137 UG/1
TABLET ORAL
Qty: 30 TABLET | Refills: 0 | Status: SHIPPED | OUTPATIENT
Start: 2019-09-03 | End: 2019-10-07

## 2019-09-29 ENCOUNTER — HEALTH MAINTENANCE LETTER (OUTPATIENT)
Age: 84
End: 2019-09-29

## 2019-10-07 ENCOUNTER — OFFICE VISIT (OUTPATIENT)
Dept: INTERNAL MEDICINE | Facility: CLINIC | Age: 84
End: 2019-10-07
Payer: MEDICARE

## 2019-10-07 VITALS
TEMPERATURE: 98.5 F | BODY MASS INDEX: 29.57 KG/M2 | OXYGEN SATURATION: 98 % | SYSTOLIC BLOOD PRESSURE: 98 MMHG | WEIGHT: 206.1 LBS | RESPIRATION RATE: 12 BRPM | DIASTOLIC BLOOD PRESSURE: 62 MMHG | HEART RATE: 69 BPM

## 2019-10-07 DIAGNOSIS — I10 ESSENTIAL HYPERTENSION, BENIGN: ICD-10-CM

## 2019-10-07 DIAGNOSIS — E03.9 HYPOTHYROIDISM, UNSPECIFIED TYPE: ICD-10-CM

## 2019-10-07 DIAGNOSIS — E78.5 HYPERLIPIDEMIA LDL GOAL <100: ICD-10-CM

## 2019-10-07 DIAGNOSIS — I63.81 LACUNAR INFARCTION (H): ICD-10-CM

## 2019-10-07 DIAGNOSIS — Z12.11 COLON CANCER SCREENING: ICD-10-CM

## 2019-10-07 DIAGNOSIS — R05.9 COUGH: ICD-10-CM

## 2019-10-07 DIAGNOSIS — Z00.00 ENCOUNTER FOR MEDICARE ANNUAL WELLNESS EXAM: Primary | ICD-10-CM

## 2019-10-07 PROCEDURE — 99213 OFFICE O/P EST LOW 20 MIN: CPT | Mod: 25 | Performed by: INTERNAL MEDICINE

## 2019-10-07 PROCEDURE — G0439 PPPS, SUBSEQ VISIT: HCPCS | Performed by: INTERNAL MEDICINE

## 2019-10-07 RX ORDER — LEVOTHYROXINE SODIUM 137 UG/1
137 TABLET ORAL DAILY
Qty: 90 TABLET | Refills: 3 | Status: SHIPPED | OUTPATIENT
Start: 2019-10-07 | End: 2020-10-19

## 2019-10-07 RX ORDER — SIMVASTATIN 20 MG
20 TABLET ORAL AT BEDTIME
Qty: 90 TABLET | Refills: 3 | Status: SHIPPED | OUTPATIENT
Start: 2019-10-07 | End: 2020-10-19

## 2019-10-07 RX ORDER — AMLODIPINE BESYLATE 5 MG/1
5 TABLET ORAL DAILY
Qty: 90 TABLET | Refills: 3 | Status: SHIPPED | OUTPATIENT
Start: 2019-10-07 | End: 2019-10-23

## 2019-10-07 SDOH — HEALTH STABILITY: MENTAL HEALTH: HOW OFTEN DO YOU HAVE A DRINK CONTAINING ALCOHOL?: 2-4 TIMES A MONTH

## 2019-10-07 SDOH — HEALTH STABILITY: MENTAL HEALTH: HOW OFTEN DO YOU HAVE 6 OR MORE DRINKS ON ONE OCCASION?: NEVER

## 2019-10-07 SDOH — HEALTH STABILITY: MENTAL HEALTH: HOW MANY STANDARD DRINKS CONTAINING ALCOHOL DO YOU HAVE ON A TYPICAL DAY?: 1 OR 2

## 2019-10-07 ASSESSMENT — PATIENT HEALTH QUESTIONNAIRE - PHQ9: SUM OF ALL RESPONSES TO PHQ QUESTIONS 1-9: 0

## 2019-10-07 NOTE — PROGRESS NOTES
"  SUBJECTIVE:   Abundio Beckett is a 83 year old male who presents for Preventive Visit.  Are you in the first 12 months of your Medicare Part B coverage?  No    Physical Health:     In general, how would you rate your overall physical health? good    Outside of work, how many days during the week do you exercise? 2-3 days/week    Outside of work, approximately how many minutes a day do you exercise?45-60 minutes    If you drink alcohol do you typically have >3 drinks per day or >7 drinks per week? No    Do you usually eat at least 4 servings of fruit and vegetables a day, include whole grains & fiber and avoid regularly eating high fat or \"junk\" foods? NO    Do you have any problems taking medications regularly?  No    Do you have any side effects from medications? none    Needs assistance for the following daily activities: no assistance needed    Which of the following safety concerns are present in your home?  throw rugs in the hallway     Hearing impairment: No    In the past 6 months, have you been bothered by leaking of urine? t oa small degree    Mental Health:    In general, how would you rate your overall mental or emotional health? good  PHQ-2 Score:      Do you feel safe in your environment? Yes    Do you have a Health Care Directive? No: Advance care planning was reviewed with patient; patient declined at this time.    Additional concerns to address?  No    Fall risk: minimal     Cognitive Screenin) Repeat 3 items (Leader, Season, Table)    2) Clock draw: NORMAL  3) 3 item recall: Recalls 1 object   Results: NORMAL clock, 1-2 items recalled: COGNITIVE IMPAIRMENT LESS LIKELY    Mini-CogTM Copyright BRANDON Shelby. Licensed by the author for use in [facility]; reprinted with permission (trudi@.[facility]). All rights reserved.      Do you have sleep apnea, excessive snoring or daytime drowsiness?: no      Reviewed and updated as needed this visit by clinical staff       Reviewed and updated as " needed this visit by Provider        Social History     Tobacco Use     Smoking status: Never Smoker     Smokeless tobacco: Never Used   Substance Use Topics     Alcohol use: Yes     Comment: rare                           Current providers sharing in care for this patient include:   Patient Care Team:  Colton Chris MD as PCP - General  Colton Chris MD as Assigned PCP    The following health maintenance items are reviewed in Epic and correct as of today:  Health Maintenance   Topic Date Due     ZOSTER IMMUNIZATION (2 of 3) 09/10/2009     DTAP/TDAP/TD IMMUNIZATION (3 - Td) 08/26/2018     TSH W/FREE T4 REFLEX  06/18/2019     FALL RISK ASSESSMENT  09/24/2019     MEDICARE ANNUAL WELLNESS VISIT  09/24/2019     PHQ-9  11/07/2019     ADVANCE CARE PLANNING  11/28/2021     DEPRESSION ACTION PLAN  Completed     INFLUENZA VACCINE  Completed     PNEUMOCOCCAL IMMUNIZATION 65+ LOW/MEDIUM RISK  Completed     IPV IMMUNIZATION  Aged Out     MENINGITIS IMMUNIZATION  Aged Out       Immunization History   Administered Date(s) Administered     HEPA 06/05/2000, 06/11/2002     Influenza (H1N1) 12/21/2009     Influenza (High Dose) 3 valent vaccine 10/01/2015, 09/07/2016, 09/14/2017, 09/18/2018     Influenza (IIV3) PF 11/22/2000, 09/25/2010, 09/03/2011     Influenza Quad, Recombinant, p-free (RIV4) 09/30/2019     LYMERIX 05/12/1999, 06/24/1999, 06/02/2000     Pneumo Conj 13-V (2010&after) 06/02/2016     Pneumococcal 23 valent 10/11/1999, 05/22/2006     Poliovirus, inactivated (IPV) 06/05/2000     TD (ADULT, 7+) 01/13/1997, 08/26/2008     Typhoid IM 06/13/2002     Yellow Fever 06/13/2002     Zoster vaccine, live 07/16/2009         ROS:  CONSTITUTIONAL: NEGATIVE for fever, chills, change in weight  ENT/MOUTH: NEGATIVE for ear, mouth and throat problems  RESP: NEGATIVE for significant SOB but he does at times note a little bit of cough particularly when he laughs or sneezes.  CV: NEGATIVE for chest pain, palpitations or peripheral  "edema  GI: NEGATIVE for nausea, abdominal pain, heartburn, or change in bowel habits  : NEGATIVE for frequency, dysuria, or hematuria  MUSCULOSKELETAL: NEGATIVE for significant arthralgias or myalgia  NEURO: NEGATIVE for weakness, dizziness or paresthesias  ENDOCRINE: NEGATIVE for temperature intolerance, skin/hair changes  HEME: NEGATIVE for bleeding problems  PSYCHIATRIC: NEGATIVE for changes in mood or affect    OBJECTIVE:   BP 98/62   Pulse 69   Temp 98.5  F (36.9  C) (Oral)   Resp 12   Wt 93.5 kg (206 lb 1.6 oz)   SpO2 98%   BMI 29.57 kg/m   Estimated body mass index is 29.62 kg/m  as calculated from the following:    Height as of 5/7/19: 1.778 m (5' 10\").    Weight as of 5/7/19: 93.6 kg (206 lb 6.4 oz).  EXAM:   GENERAL: alert and no distress  EYES: Eyes grossly normal to inspection, PERRL and conjunctivae and sclerae normal  HENT: ear canals and TM's normal, nose and mouth without ulcers or lesions  NECK: no adenopathy, no asymmetry, masses, or scars and thyroid normal to palpation  RESP: lungs clear to auscultation - no rales, rhonchi or wheezes  CV: regular rate and rhythm, normal S1 S2, no S3 or S4, no click or rub, no peripheral edema and peripheral pulses strong  ABDOMEN: soft, nontender, no hepatosplenomegaly, no masses and bowel sounds normal  MS: no gross musculoskeletal defects noted, no edema  NEURO: No focal changes  PSYCH: mentation appears normal, affect normal/bright    ASSESSMENT / PLAN:   1. Encounter for Medicare annual wellness exam  Suggested completing shingles vaccination  - Hemoglobin; Future  - Comprehensive metabolic panel; Future  - Lipid Profile; Future    2. Essential hypertension, benign  Stable on therapy continue with current medical management  - Comprehensive metabolic panel; Future  - amLODIPine (NORVASC) 5 MG tablet; Take 1 tablet (5 mg) by mouth daily  Dispense: 90 tablet; Refill: 3    3. Hypothyroidism, unspecified type  Continue with current medical management " "recheck thyroid function test as ordered  - TSH with free T4 reflex; Future  - levothyroxine (SYNTHROID/LEVOTHROID) 137 MCG tablet; Take 1 tablet (137 mcg) by mouth daily  Dispense: 90 tablet; Refill: 3  - OFFICE/OUTPT VISIT,EST,LEVL III    4. Lacunar infarction (H)  Stable without significant residual impact, continue with current medication  - simvastatin (ZOCOR) 20 MG tablet; Take 1 tablet (20 mg) by mouth At Bedtime  Dispense: 90 tablet; Refill: 3    5. Colon cancer screening  As ordered continue with FIT testing annually  - Fecal colorectal cancer screen (FIT); Future    6. Hyperlipidemia LDL goal <100  Labs ordered as fasting continue with statin therapy and recheck lipids annually  - Lipid Profile; Future  - simvastatin (ZOCOR) 20 MG tablet; Take 1 tablet (20 mg) by mouth At Bedtime  Dispense: 90 tablet; Refill: 3  - OFFICE/OUTPT VISIT,EST,LEVL III    7. Cough  Etiology unclear with no focal changes on exam, suggest chest x-ray for reassurance  - XR Chest 2 Views; Future  - OFFICE/OUTPT VISIT,EST,LEVL III    End of Life Planning:  Patient currently has an advanced directive: No.  I have verified the patient's ablity to prepare an advanced directive/make health care decisions.  Literature was provided to assist patient in preparing an advanced directive.    COUNSELING:  Reviewed preventive health counseling, as reflected in patient instructions       Regular exercise       Healthy diet/nutrition    Estimated body mass index is 29.62 kg/m  as calculated from the following:    Height as of 5/7/19: 1.778 m (5' 10\").    Weight as of 5/7/19: 93.6 kg (206 lb 6.4 oz).     reports that he has never smoked. He has never used smokeless tobacco.      Appropriate preventive services were discussed with this patient, including applicable screening as appropriate for cardiovascular disease, diabetes, osteopenia/osteoporosis, and glaucoma.  As appropriate for age/gender, discussed screening for colorectal cancer, prostate " cancer, breast cancer, and cervical cancer. Checklist reviewing preventive services available has been given to the patient.    Reviewed patients plan of care and provided an AVS. The Basic Care Plan (routine screening as documented in Health Maintenance) for Abundio meets the Care Plan requirement. This Care Plan has been established and reviewed with the Patient.    Counseling Resources:  ATP IV Guidelines  Pooled Cohorts Equation Calculator  Breast Cancer Risk Calculator  FRAX Risk Assessment  ICSI Preventive Guidelines  Dietary Guidelines for Americans, 2010  Clicker's MyPlate  ASA Prophylaxis  Lung CA Screening    Colton Chris MD  [facility]    THE MEDICATION LIST HAS BEEN FULLY RECONCILED BY THE M.D. AND THE NURSING STAFF.

## 2019-10-07 NOTE — PATIENT INSTRUCTIONS
Patient Education   Personalized Prevention Plan  You are due for the preventive services outlined below.  Your care team is available to assist you in scheduling these services.  If you have already completed any of these items, please share that information with your care team to update in your medical record.  Health Maintenance Due   Topic Date Due     Zoster (Shingles) Vaccine (2 of 3) 09/10/2009     Diptheria Tetanus Pertussis (DTAP/TDAP/TD) Vaccine (3 - Td) 08/26/2018     Thyroid Function Lab  06/18/2019     FALL RISK ASSESSMENT  09/24/2019     Annual Wellness Visit  09/24/2019     Depression Assessment  11/07/2019

## 2019-10-09 ENCOUNTER — ANCILLARY PROCEDURE (OUTPATIENT)
Dept: GENERAL RADIOLOGY | Facility: CLINIC | Age: 84
End: 2019-10-09
Attending: INTERNAL MEDICINE
Payer: MEDICARE

## 2019-10-09 ENCOUNTER — TELEPHONE (OUTPATIENT)
Dept: INTERNAL MEDICINE | Facility: CLINIC | Age: 84
End: 2019-10-09

## 2019-10-09 DIAGNOSIS — E78.5 HYPERLIPIDEMIA LDL GOAL <100: ICD-10-CM

## 2019-10-09 DIAGNOSIS — I10 ESSENTIAL HYPERTENSION: ICD-10-CM

## 2019-10-09 DIAGNOSIS — I10 ESSENTIAL HYPERTENSION, BENIGN: ICD-10-CM

## 2019-10-09 DIAGNOSIS — E03.9 HYPOTHYROIDISM, UNSPECIFIED TYPE: ICD-10-CM

## 2019-10-09 DIAGNOSIS — Z00.00 ENCOUNTER FOR MEDICARE ANNUAL WELLNESS EXAM: ICD-10-CM

## 2019-10-09 DIAGNOSIS — R05.9 COUGH: ICD-10-CM

## 2019-10-09 LAB
ALBUMIN SERPL-MCNC: 3.9 G/DL (ref 3.4–5)
ALP SERPL-CCNC: 66 U/L (ref 40–150)
ALT SERPL W P-5'-P-CCNC: 15 U/L (ref 0–70)
ANION GAP SERPL CALCULATED.3IONS-SCNC: 7 MMOL/L (ref 3–14)
AST SERPL W P-5'-P-CCNC: 7 U/L (ref 0–45)
BILIRUB SERPL-MCNC: 0.9 MG/DL (ref 0.2–1.3)
BUN SERPL-MCNC: 14 MG/DL (ref 7–30)
CALCIUM SERPL-MCNC: 8.3 MG/DL (ref 8.5–10.1)
CHLORIDE SERPL-SCNC: 103 MMOL/L (ref 94–109)
CHOLEST SERPL-MCNC: 126 MG/DL
CO2 SERPL-SCNC: 28 MMOL/L (ref 20–32)
CREAT SERPL-MCNC: 1.04 MG/DL (ref 0.66–1.25)
GFR SERPL CREATININE-BSD FRML MDRD: 66 ML/MIN/{1.73_M2}
GLUCOSE SERPL-MCNC: 141 MG/DL (ref 70–99)
HDLC SERPL-MCNC: 50 MG/DL
HGB BLD-MCNC: 14.8 G/DL (ref 13.3–17.7)
LDLC SERPL CALC-MCNC: 55 MG/DL
NONHDLC SERPL-MCNC: 76 MG/DL
POTASSIUM SERPL-SCNC: 4.3 MMOL/L (ref 3.4–5.3)
PROT SERPL-MCNC: 7 G/DL (ref 6.8–8.8)
SODIUM SERPL-SCNC: 138 MMOL/L (ref 133–144)
TRIGL SERPL-MCNC: 105 MG/DL
TSH SERPL DL<=0.005 MIU/L-ACNC: 1.79 MU/L (ref 0.4–4)

## 2019-10-09 PROCEDURE — 71046 X-RAY EXAM CHEST 2 VIEWS: CPT

## 2019-10-09 PROCEDURE — 85018 HEMOGLOBIN: CPT | Performed by: INTERNAL MEDICINE

## 2019-10-09 PROCEDURE — 80061 LIPID PANEL: CPT | Performed by: INTERNAL MEDICINE

## 2019-10-09 PROCEDURE — 80053 COMPREHEN METABOLIC PANEL: CPT | Performed by: INTERNAL MEDICINE

## 2019-10-09 PROCEDURE — 36415 COLL VENOUS BLD VENIPUNCTURE: CPT | Performed by: INTERNAL MEDICINE

## 2019-10-09 PROCEDURE — 84443 ASSAY THYROID STIM HORMONE: CPT | Performed by: INTERNAL MEDICINE

## 2019-10-09 NOTE — TELEPHONE ENCOUNTER
Patient called to see if PCP received the Ambulance report this morning? He dropped this off to the  with his name and PCP name on it.    He sated Ambulance came to his house. They did an EKG and Vitals. Patient wanted to know his thoughts and if the had concerns that his BP was 88/52.    Please advise if you have received this .    Constance WATSON RN, BSN, PHN

## 2019-10-14 NOTE — TELEPHONE ENCOUNTER
EKG rhythm strip reviewed and it appears to demonstrate a slightly prolonged QT interval which appears to have been present in the past EKGs, suggest follow-up further to discuss recent results

## 2019-10-23 ENCOUNTER — OFFICE VISIT (OUTPATIENT)
Dept: INTERNAL MEDICINE | Facility: CLINIC | Age: 84
End: 2019-10-23
Payer: MEDICARE

## 2019-10-23 VITALS
WEIGHT: 204 LBS | RESPIRATION RATE: 15 BRPM | BODY MASS INDEX: 29.2 KG/M2 | DIASTOLIC BLOOD PRESSURE: 70 MMHG | HEIGHT: 70 IN | TEMPERATURE: 98.1 F | SYSTOLIC BLOOD PRESSURE: 130 MMHG | OXYGEN SATURATION: 96 % | HEART RATE: 59 BPM

## 2019-10-23 DIAGNOSIS — R73.9 HYPERGLYCEMIA: Primary | ICD-10-CM

## 2019-10-23 DIAGNOSIS — I10 ESSENTIAL HYPERTENSION, BENIGN: ICD-10-CM

## 2019-10-23 LAB
ANION GAP SERPL CALCULATED.3IONS-SCNC: 4 MMOL/L (ref 3–14)
BUN SERPL-MCNC: 12 MG/DL (ref 7–30)
CALCIUM SERPL-MCNC: 8.6 MG/DL (ref 8.5–10.1)
CHLORIDE SERPL-SCNC: 107 MMOL/L (ref 94–109)
CO2 SERPL-SCNC: 28 MMOL/L (ref 20–32)
CREAT SERPL-MCNC: 0.98 MG/DL (ref 0.66–1.25)
GFR SERPL CREATININE-BSD FRML MDRD: 71 ML/MIN/{1.73_M2}
GLUCOSE SERPL-MCNC: 94 MG/DL (ref 70–99)
HBA1C MFR BLD: 5.3 % (ref 0–5.6)
POTASSIUM SERPL-SCNC: 4.4 MMOL/L (ref 3.4–5.3)
SODIUM SERPL-SCNC: 139 MMOL/L (ref 133–144)

## 2019-10-23 PROCEDURE — 36415 COLL VENOUS BLD VENIPUNCTURE: CPT | Performed by: INTERNAL MEDICINE

## 2019-10-23 PROCEDURE — 80048 BASIC METABOLIC PNL TOTAL CA: CPT | Performed by: INTERNAL MEDICINE

## 2019-10-23 PROCEDURE — 99214 OFFICE O/P EST MOD 30 MIN: CPT | Performed by: INTERNAL MEDICINE

## 2019-10-23 PROCEDURE — 83036 HEMOGLOBIN GLYCOSYLATED A1C: CPT | Performed by: INTERNAL MEDICINE

## 2019-10-23 RX ORDER — AMLODIPINE BESYLATE 5 MG/1
10 TABLET ORAL DAILY
Qty: 90 TABLET | Refills: 3
Start: 2019-10-23 | End: 2019-12-12

## 2019-10-23 ASSESSMENT — MIFFLIN-ST. JEOR: SCORE: 1626.59

## 2019-10-23 NOTE — PROGRESS NOTES
Subjective     Abundio Beckett is a 83 year old male who presents to clinic today for the following health issues:    HPI     Follow up 10/9/19 elevated glucose lab.    Other concerns:    1. Patient had episode of lightheadedness, excessive perspiration on 10/9/19 and EMS was called. EKG was completed, patient brought in copy a few weeks ago. Patients BP was 88/52 when checked by EMS.    Patient Active Problem List   Diagnosis     Hypertrophy (benign) of prostate     Allergic rhinitis     Advance Care Planning     Hyperlipidemia LDL goal <100     Hyperglycemia     Lacunar infarction (H)     Hypothyroidism, unspecified type     Essential hypertension, benign     Past Surgical History:   Procedure Laterality Date     C REMV PROSTATE,PERINEAL,RADICAL  2000     HC SHOULDER ARTHROSCOPY, DX         Social History     Tobacco Use     Smoking status: Never Smoker     Smokeless tobacco: Never Used   Substance Use Topics     Alcohol use: Yes     Frequency: 2-4 times a month     Drinks per session: 1 or 2     Binge frequency: Never     No family history on file.      Current Outpatient Medications   Medication Sig Dispense Refill     amLODIPine (NORVASC) 5 MG tablet Take 1 tablet (5 mg) by mouth daily 90 tablet 3     aspirin 325 MG EC tablet Take 1 tablet by mouth daily. 100 tablet 3     BETIMOL OP qd       CENTRUM SILVER OR TABS 1 TABLET DAILY       citalopram (CELEXA) 20 MG tablet Take 1 tablet (20 mg) by mouth daily (Patient taking differently: Take 20 mg by mouth daily as needed ) 90 tablet 1     levothyroxine (SYNTHROID/LEVOTHROID) 137 MCG tablet Take 1 tablet (137 mcg) by mouth daily 90 tablet 3     simvastatin (ZOCOR) 20 MG tablet Take 1 tablet (20 mg) by mouth At Bedtime 90 tablet 3     XALATAN 0.005 % OP SOLN qd       Allergies   Allergen Reactions     No Known Drug Allergies      BP Readings from Last 3 Encounters:   10/07/19 98/62   05/07/19 116/72   09/24/18 106/62    Wt Readings from Last 3 Encounters:   10/07/19  "93.5 kg (206 lb 1.6 oz)   05/07/19 93.6 kg (206 lb 6.4 oz)   09/24/18 90.9 kg (200 lb 6.4 oz)           Reviewed and updated as needed this visit by Provider         Review of Systems   ROS COMP: CONSTITUTIONAL: NEGATIVE for fever, chills, change in weight  ENT/MOUTH: NEGATIVE for ear, mouth and throat problems  RESP: NEGATIVE for significant cough or SOB  CV: NEGATIVE for chest pain, palpitations or peripheral edema  GI: NEGATIVE for nausea, abdominal pain, heartburn, or change in bowel habits  : NEGATIVE for frequency, dysuria, or hematuria  MUSCULOSKELETAL: NEGATIVE for significant arthralgias or myalgia  NEURO: NEGATIVE for weakness, dizziness or paresthesias  ENDOCRINE: NEGATIVE for temperature intolerance, skin/hair changes  HEME: NEGATIVE for bleeding problems  PSYCHIATRIC: NEGATIVE for changes in mood or affect      Objective    /70   Pulse 59   Temp 98.1  F (36.7  C) (Oral)   Resp 15   Ht 1.778 m (5' 10\")   Wt 92.5 kg (204 lb)   SpO2 96%   BMI 29.27 kg/m    Body mass index is 29.27 kg/m .  Physical Exam   GENERAL: alert and no distress  EYES: Eyes grossly normal to inspection, PERRL and conjunctivae and sclerae normal  HENT: ear canals and TM's normal, nose and mouth without ulcers or lesions  NECK: no adenopathy, no asymmetry, masses, or scars and thyroid normal to palpation  RESP: lungs clear to auscultation - no rales, rhonchi or wheezes  CV: regular rate and rhythm, normal S1 S2, no S3 or S4, no murmur, click or rub, no peripheral edema and peripheral pulses strong  MS: no gross musculoskeletal defects noted, no edema  NEURO: Normal strength and tone, mentation intact and speech normal  PSYCH: mentation appears normal, affect normal/bright         Assessment & Plan     1. Hyperglycemia  We will recheck his A1c level and his basic metabolic panel currently fasting.  Patient is also made changes in his diet by eliminating his oral intake of candy.  - Basic metabolic panel  - Hemoglobin " "A1c    2. Essential hypertension, benign  Patient has reviewed with me his home blood pressures which have been slightly high in regards to his home values as compared to today.  We discussed that he will give a trial of 10 mg of amlodipine in the morning to see how he responds and report those blood pressures to me in about 3 weeks  - amLODIPine (NORVASC) 5 MG tablet; Take 2 tablets (10 mg) by mouth daily  Dispense: 90 tablet; Refill: 3    I also discussed with the patient his recent ambulance assessment that was done for which I was sent a copy of his rhythm strips.  I reviewed the strips with him in the results is demonstrating nonspecific changes which were discussed.  I suggested that the patient may benefit from a stress test for reassurance but we also discussed observation as his symptoms were very nonspecific and potentially related to the fact he had not eaten prior to exercising when his symptoms occurred.  He is not interested in pursuing any assessment at this point as he is had no recurrent symptoms but will contact me if he notes any changes.     BMI:   Estimated body mass index is 29.57 kg/m  as calculated from the following:    Height as of 5/7/19: 1.778 m (5' 10\").    Weight as of 10/7/19: 93.5 kg (206 lb 1.6 oz).     See Patient Instructions    Return in about 2 weeks (around 11/6/2019) for if symptoms recur or worsen, BP Recheck.    Colton Chris MD  [facility]      "

## 2019-10-23 NOTE — LETTER
[facility]  [address] [address]  [phone]      10/23/2019       Abundio Beckett  [id number]   [place] [id number]        Dear Abundio,    Your repeat blood sugar and A1c level returned normal.  I would consider rechecking these at your next annual exam.    Sincerely,      Colton Chris MD  Internal Medicine

## 2019-11-11 ENCOUNTER — OFFICE VISIT (OUTPATIENT)
Dept: DERMATOLOGY | Facility: CLINIC | Age: 84
End: 2019-11-11
Payer: MEDICARE

## 2019-11-11 VITALS — OXYGEN SATURATION: 99 % | SYSTOLIC BLOOD PRESSURE: 123 MMHG | HEART RATE: 65 BPM | DIASTOLIC BLOOD PRESSURE: 72 MMHG

## 2019-11-11 DIAGNOSIS — D48.5 NEOPLASM OF UNCERTAIN BEHAVIOR OF SKIN: Primary | ICD-10-CM

## 2019-11-11 DIAGNOSIS — L81.4 LENTIGINES: ICD-10-CM

## 2019-11-11 DIAGNOSIS — D18.01 CHERRY ANGIOMA: ICD-10-CM

## 2019-11-11 DIAGNOSIS — L57.0 ACTINIC KERATOSES: ICD-10-CM

## 2019-11-11 DIAGNOSIS — L82.1 SEBORRHEIC KERATOSES: ICD-10-CM

## 2019-11-11 DIAGNOSIS — D22.9 MULTIPLE BENIGN NEVI: ICD-10-CM

## 2019-11-11 DIAGNOSIS — L82.0 INFLAMED SEBORRHEIC KERATOSIS: ICD-10-CM

## 2019-11-11 DIAGNOSIS — L57.8 SOLAR ELASTOSIS: ICD-10-CM

## 2019-11-11 PROCEDURE — 11102 TANGNTL BX SKIN SINGLE LES: CPT | Mod: 59 | Performed by: PHYSICIAN ASSISTANT

## 2019-11-11 PROCEDURE — 11103 TANGNTL BX SKIN EA SEP/ADDL: CPT | Performed by: PHYSICIAN ASSISTANT

## 2019-11-11 PROCEDURE — 17003 DESTRUCT PREMALG LES 2-14: CPT | Mod: 59 | Performed by: PHYSICIAN ASSISTANT

## 2019-11-11 PROCEDURE — 17000 DESTRUCT PREMALG LESION: CPT | Mod: 59 | Performed by: PHYSICIAN ASSISTANT

## 2019-11-11 PROCEDURE — 88305 TISSUE EXAM BY PATHOLOGIST: CPT | Mod: TC | Performed by: PHYSICIAN ASSISTANT

## 2019-11-11 PROCEDURE — 99203 OFFICE O/P NEW LOW 30 MIN: CPT | Mod: 25 | Performed by: PHYSICIAN ASSISTANT

## 2019-11-11 PROCEDURE — 17110 DESTRUCTION B9 LES UP TO 14: CPT | Performed by: PHYSICIAN ASSISTANT

## 2019-11-11 NOTE — PATIENT INSTRUCTIONS
Wound Care Instructions     FOR SUPERFICIAL WOUNDS     [facility] [phone]    [facility] [phone]          AFTER 24 HOURS YOU SHOULD REMOVE THE BANDAGE AND BEGIN DAILY DRESSING CHANGES AS FOLLOWS:     1) Remove Dressing.     2) Clean and dry the area with tap water using a Q-tip or sterile gauze pad.     3) Apply Vaseline, Aquaphor, Polysporin ointment or Bacitracin ointment over entire wound.  Do NOT use Neosporin ointment.     4) Cover the wound with a band-aid, or a sterile non-stick gauze pad and micropore paper tape      REPEAT THESE INSTRUCTIONS AT LEAST ONCE A DAY UNTIL THE WOUND HAS COMPLETELY HEALED.    It is an old wives tale that a wound heals better when it is exposed to air and allowed to dry out. The wound will heal faster with a better cosmetic result if it is kept moist with ointment and covered with a bandage.    **Do not let the wound dry out.**      Supplies Needed:      *Cotton tipped applicators (Q-tips)    *Polysporin Ointment or Bacitracin Ointment (NOT NEOSPORIN)    *Band-aids or non-stick gauze pads and micropore paper tape.      PATIENT INFORMATION:    During the healing process you will notice a number of changes. All wounds develop a small halo of redness surrounding the wound.  This means healing is occurring. Severe itching with extensive redness usually indicates sensitivity to the ointment or bandage tape used to dress the wound.  You should call our office if this develops.      Swelling  and/or discoloration around your surgical site is common, particularly when performed around the eye.    All wounds normally drain.  The larger the wound the more drainage there will be.  After 7-10 days, you will notice the wound beginning to shrink and new skin will begin to grow.  The wound is healed when you can see skin has formed over the entire area.  A healed wound has a healthy, shiny look to the surface and is red to dark pink in color to  normalize.  Wounds may take approximately 4-6 weeks to heal.  Larger wounds may take 6-8 weeks.  After the wound is healed you may discontinue dressing changes.    You may experience a sensation of tightness as your wound heals. This is normal and will gradually subside.    Your healed wound may be sensitive to temperature changes. This sensitivity improves with time, but if you re having a lot of discomfort, try to avoid temperature extremes.    Patients frequently experience itching after their wound appears to have healed because of the continue healing under the skin.  Plain Vaseline will help relieve the itching.        POSSIBLE COMPLICATIONS    BLEEDIN. Leave the bandage in place.  2. Use tightly rolled up gauze or a cloth to apply direct pressure over the bandage for 30  minutes.  3. Reapply pressure for an additional 30 minutes if necessary  4. Use additional gauze and tape to maintain pressure once the bleeding has stopped.    WOUND CARE INSTRUCTIONS  FOR CRYOSURGERY        This area treated with liquid nitrogen will form a blister. You do not need to bandage the area until after the blister forms and breaks (which may be a few days).  When the blister breaks, begin daily dressing changes as follows:    1) Clean and dry the area with tap water using clean Q-tip or sterile gauze pad.    2) Apply Polysporin ointment or Bacitracin ointment over entire wound.  Do NOT use Neosporin ointment.    3) Cover the wound with a band-aid or sterile non-stick gauze pad and micropore paper tape.      REPEAT THESE INSTRUCTIONS AT LEAST ONCE A DAY UNTIL THE WOUND HAS COMPLETELY HEALED.        It is an old wives tale that a wound heals better when it is exposed to air and allowed to dry out. The wound will heal faster with a better cosmetic result if it is kept moist with ointment and covered with a bandage.  Do not let the wound dry out.      Supplies Needed:     *Cotton tipped applicators (Q-tips)   *Polysporin  ointment or Bacitracin ointment (NOT NEOSPORIN)   *Band-aids, or non stick gauze pads and micropore paper tape    PATIENT INFORMATION    During the healing process you will notice a number of changes. All wounds develop a small halo of redness surrounding the wound.  This means healing is occurring. Severe itching with extensive redness usually indicates sensitivity to the ointment or bandage tape used to dress the wound.  You should call our office if this develops.      Swelling and/or discoloration around your surgical site is common, particularly when performed around the eye.    All wounds normally drain.  The larger the wound the more drainage there will be.  After 7-10 days, you will notice the wound beginning to shrink and new skin will begin to grow.  The wound is healed when you can see skin has formed over the entire area.  A healed wound has a healthy, shiny look to the surface and is red to dark pink in color to normalize.  Wounds may take approximately 4-6 weeks to heal.  Larger wounds may take 6-8 weeks.  After the wound is healed you may discontinue dressing changes.    You may experience a sensation of tightness as your wound heals. This is normal and will gradually subside.    Your healed wound may be sensitive to temperature changes. This sensitivity improves with time, but if you re having a lot of discomfort, try to avoid temperature extremes.    Patients frequently experience itching after their wound appears to have healed because of the continue healing under the skin.  Plain Vaseline will help relieve the itching.               Proper skin care from [facility]:    -Eliminate harsh soaps as they strip the natural oils from the skin, often resulting in dry itchy skin ( i.e. Dial, Zest, Paula Spring)  -Use mild soaps such as Cetaphil or Dove Sensitive Skin in the shower. You do not need to use soap on arms, legs, and trunk every time you shower unless visibly soiled.   -Avoid hot or  cold showers.  -After showering, lightly dry off and apply moisturizing within 2-3 minutes. This will help trap moisture in the skin.   -Aggressive use of a moisturizer at least 1-2 times a day to the entire body (including -Vanicream, Cetaphil, Aquaphor or Cerave) and moisturize hands after every washing.  -We recommend using moisturizers that come in a tub that needs to be scooped out, not a pump. This has more of an oil base. It will hold moisture in your skin much better than a water base moisturizer. The above recommended are non-pore clogging.      Wear a sunscreen with at least SPF 30 on your face, ears, neck and V of the chest daily. Wear sunscreen on other areas of the body if those areas are exposed to the sun throughout the day. Sunscreens can contain physical and/or chemical blockers. Physical blockers are less likely to clog pores, these include zinc oxide and titanium dioxide. Reapply every two hour and after swimming. Sunscreen examples include Neutrogena, CeraVe, Blue Lizard, Elta MD and many others.    UV radiation  UVA radiation remains constant throughout the day and throughout the year. It is a longer wavelength than UVB and therefore penetrates deeper into the skin leading to immediate and delayed tanning, photoaging, and skin cancer. 70-80% of UVA and UVB radiation occurs between the hours of 10am-2pm.  UVB radiation  UVB radiation causes the most harmful effects and is more significant during the summer months. However, snow and ice can reflect UVB radiation leading to skin damage during the winter months as well. UVB radiation is responsible for tanning, burning, inflammation, delayed erythema (pinkness), pigmentation (brown spots), and skin cancer.

## 2019-11-11 NOTE — PROGRESS NOTES
HPI:  Abundio Beckett is a 83 year old male patient here today for growths on left cheek area .  Patient states this has been present for a few months.  Patient reports the following symptoms: growing .  Patient reports the following previous treatments: nonen.  Patient reports the following modifying factors: none.  Associated symptoms: none.  Patient has no other skin complaints today.  Remainder of the HPI, Meds, PMH, Allergies, FH, and SH was reviewed in chart.    Pertinent Hx:   No personal or family history of skin cancer    Past Medical History:   Diagnosis Date     BENIGN PROSTATIC HYPERTROPHY 9/17/2002     Essential hypertension, benign 11/8/2016     Hyperlipidemia LDL goal <100 5/29/2012     Hypertrophy (benign) of prostate     abstracted 7/5/02.     HYPOTHYROIDISM NOS 9/17/2002     Lacunar infarction (H) 9/4/2012     Unspecified hypothyroidism     abstracted 7/5/02.       Past Surgical History:   Procedure Laterality Date     C REMV PROSTATE,PERINEAL,RADICAL  2000     HC SHOULDER ARTHROSCOPY, DX          History reviewed. No pertinent family history.    Social History     Socioeconomic History     Marital status:      Spouse name: Not on file     Number of children: Not on file     Years of education: Not on file     Highest education level: Not on file   Occupational History     Not on file   Social Needs     Financial resource strain: Not on file     Food insecurity:     Worry: Not on file     Inability: Not on file     Transportation needs:     Medical: Not on file     Non-medical: Not on file   Tobacco Use     Smoking status: Never Smoker     Smokeless tobacco: Never Used   Substance and Sexual Activity     Alcohol use: Yes     Frequency: 2-4 times a month     Drinks per session: 1 or 2     Binge frequency: Never     Drug use: No     Sexual activity: Not Currently     Partners: Female   Lifestyle     Physical activity:     Days per week: Not on file     Minutes per session: Not on file      Stress: Not on file   Relationships     Social connections:     Talks on phone: Not on file     Gets together: Not on file     Attends Scientologist service: Not on file     Active member of club or organization: Not on file     Attends meetings of clubs or organizations: Not on file     Relationship status: Not on file     Intimate partner violence:     Fear of current or ex partner: Not on file     Emotionally abused: Not on file     Physically abused: Not on file     Forced sexual activity: Not on file   Other Topics Concern     Parent/sibling w/ CABG, MI or angioplasty before 65F 55M? Not Asked   Social History Narrative     Not on file       Outpatient Encounter Medications as of 11/11/2019   Medication Sig Dispense Refill     amLODIPine (NORVASC) 5 MG tablet Take 2 tablets (10 mg) by mouth daily 90 tablet 3     aspirin 325 MG EC tablet Take 1 tablet by mouth daily. 100 tablet 3     BETIMOL OP qd       CENTRUM SILVER OR TABS 1 TABLET DAILY       citalopram (CELEXA) 20 MG tablet Take 1 tablet (20 mg) by mouth daily (Patient taking differently: Take 20 mg by mouth daily as needed ) 90 tablet 1     levothyroxine (SYNTHROID/LEVOTHROID) 137 MCG tablet Take 1 tablet (137 mcg) by mouth daily 90 tablet 3     simvastatin (ZOCOR) 20 MG tablet Take 1 tablet (20 mg) by mouth At Bedtime 90 tablet 3     XALATAN 0.005 % OP SOLN qd       No facility-administered encounter medications on file as of 11/11/2019.        Review Of Systems:  Skin: As above  Eyes: negative  Ears/Nose/Throat: negative  Respiratory: No shortness of breath, dyspnea on exertion, cough, or hemoptysis  Cardiovascular: negative  Gastrointestinal: negative  Genitourinary: negative  Musculoskeletal: negative  Neurologic: negative  Psychiatric: negative  Hematologic/Lymphatic/Immunologic: negative  Endocrine: negative      Objective:     /72   Pulse 65   SpO2 99%   Eyes: Conjunctivae/lids: Normal   ENT: Lips:  Normal  MSK: Normal  Cardiovascular:  Peripheral edema none  Pulm: Breathing Normal  Neuro/Psych: Orientation: Normal; Mood/Affect: Normal, NAD, WDWN  Pt accompanied by: self  Following areas examined: Scalp, face, eyelids, lips, neck, chest, abdomen, back, UE. Pt defers waist and below exam  Prieto skin type:ii   Findings:  Red smooth well-defined macules on trunk and U extremities.  Brown, stuck-on scaly appearing papules on trunk and U extremities.  Well circumscribed macules with symmetric color distribution on trunk and U extremities.  Evans WD smooth macules on face, neck, trunk, and U extremities.  Blue smooth papule on left zygoma 0.5cm  Smooth flesh and tan papule on left lateral inferior eyelid 0.3cm  Pink scaly macule/s on face, ears, forearms, dorsal hands x 11 total  Rhytides, hypo/hyperpigmentation, and atrophy    Assessment and Plan:     1) Cherry angiomas, Seborrheic keratoses, Benign nevi, Lentigines     I discussed the specifics of tumor, prognosis, and genetics of benign lesions.  I explained that treatment of these lesions would be purely cosmetic and not medically neccessary.  I discussed with patient different removal options including excision, cryotherapy, cautery and /or laser.  Lesion may recur and/or may not completely resolve. May need additional treatment.     2) Neoplasm of uncertain behavior left zygoma 0.5cm  Hemangioma vs bcc doubt MM  TANGENTIAL BIOPSY:  After consent, anesthesia with LEC and prep, tangential biopsy performed.  No complications and routine wound care.  May grow back and will get a scar. Based on lesion type may need to completely remove lesion. Patient will be notified in 7-10 days of results. Wound care directions given.    3) Neoplasm of uncertain behavior left lateral inferior eyelid 0.3cm  Benign nevus doubt bcc  TANGENTIAL BIOPSY:  After consent, anesthesia with LEC and prep, tangential biopsy performed.  No complications and routine wound care.  May grow back and will get a scar. Based on  lesion type may need to completely remove lesion. Patient will be notified in 7-10 days of results. Wound care directions given.    4) Actinic keratoses x 11 and solar elastosis    LN2 for 5 seconds x 2. Discussed AE include hypopigmentation (white spot) and recurrence. Follow up in 2-3 months to recheck lesions. There is a risk of AKs developing into a SCC.   Treatment options include LN2 vs PDT vs Efudex. Pt elected LN2    5) ISK x 8  Benign etiology and course of lesion.  LN2: Treated with LN2 for 5s for 1-2 cycles. Warned risks of blistering, pain, pigment change, scarring, and incomplete resolution.  Advised patient to return if lesions do not completely resolve within 2-3 months.  Wound care sheet given.  Signs and Symptoms of non-melanoma skin cancer and ABCDEs of melanoma reviewed with patient. Patient encouraged to perform monthly self skin exams and educated on how to perform them. UV precautions reviewed with patient. Patient was asked about new or changing moles/lesions on body.   Wear a sunscreen with at least SPF 30 on your face, ears, neck and V of the chest daily. Wear sunscreen on other areas of the body if those areas are exposed to the sun throughout the day. Sunscreens can contain physical and/or chemical blockers. Physical blockers are less likely to clog pores, these include zinc oxide and titanium dioxide. Reapply every two hour and after swimming. Sunscreen examples include Neutrogena, CeraVe, Blue Lizard, Elta MD and many others.    Proper skin care from [facility]:    -Eliminate harsh soaps as they strip the natural oils from the skin, often resulting in dry itchy skin ( i.e. Dial, Zest, Paula Spring)  -Use mild soaps such as Cetaphil or Dove Sensitive Skin in the shower. You do not need to use soap on arms, legs, and trunk every time you shower unless visibly soiled.   -Avoid hot or cold showers.  -After showering, lightly dry off and apply moisturizing within 2-3 minutes. This  will help trap moisture in the skin.   -Aggressive use of a moisturizer at least 1-2 times a day to the entire body (including -Vanicream, Cetaphil, Aquaphor or Cerave) and moisturize hands after every washing.  -We recommend using moisturizers that come in a tub that needs to be scooped out, not a pump. This has more of an oil base. It will hold moisture in your skin much better than a water base moisturizer. The above recommended are non-pore clogging.               Follow up in 2-3 months if aks not resolved.

## 2019-11-11 NOTE — LETTER
11/11/2019         RE: Abundio Beckett  [address]  [facility] [address]        Dear Colleague,    Thank you for referring your patient, Abundio Beckett, to the [facility]. Please see a copy of my visit note below.    HPI:  Abundio Bekcett is a 83 year old male patient here today for growths on left cheek area .  Patient states this has been present for a few months.  Patient reports the following symptoms: growing .  Patient reports the following previous treatments: nonen.  Patient reports the following modifying factors: none.  Associated symptoms: none.  Patient has no other skin complaints today.  Remainder of the HPI, Meds, PMH, Allergies, FH, and SH was reviewed in chart.    Pertinent Hx:   No personal or family history of skin cancer    Past Medical History:   Diagnosis Date     BENIGN PROSTATIC HYPERTROPHY 9/17/2002     Essential hypertension, benign 11/8/2016     Hyperlipidemia LDL goal <100 5/29/2012     Hypertrophy (benign) of prostate     abstracted 7/5/02.     HYPOTHYROIDISM NOS 9/17/2002     Lacunar infarction (H) 9/4/2012     Unspecified hypothyroidism     abstracted 7/5/02.       Past Surgical History:   Procedure Laterality Date     C REMV PROSTATE,PERINEAL,RADICAL  2000     HC SHOULDER ARTHROSCOPY, DX          History reviewed. No pertinent family history.    Social History     Socioeconomic History     Marital status:      Spouse name: Not on file     Number of children: Not on file     Years of education: Not on file     Highest education level: Not on file   Occupational History     Not on file   Social Needs     Financial resource strain: Not on file     Food insecurity:     Worry: Not on file     Inability: Not on file     Transportation needs:     Medical: Not on file     Non-medical: Not on file   Tobacco Use     Smoking status: Never Smoker     Smokeless tobacco: Never Used   Substance and Sexual Activity     Alcohol use: Yes      Frequency: 2-4 times a month     Drinks per session: 1 or 2     Binge frequency: Never     Drug use: No     Sexual activity: Not Currently     Partners: Female   Lifestyle     Physical activity:     Days per week: Not on file     Minutes per session: Not on file     Stress: Not on file   Relationships     Social connections:     Talks on phone: Not on file     Gets together: Not on file     Attends Restoration service: Not on file     Active member of club or organization: Not on file     Attends meetings of clubs or organizations: Not on file     Relationship status: Not on file     Intimate partner violence:     Fear of current or ex partner: Not on file     Emotionally abused: Not on file     Physically abused: Not on file     Forced sexual activity: Not on file   Other Topics Concern     Parent/sibling w/ CABG, MI or angioplasty before 65F 55M? Not Asked   Social History Narrative     Not on file       Outpatient Encounter Medications as of 11/11/2019   Medication Sig Dispense Refill     amLODIPine (NORVASC) 5 MG tablet Take 2 tablets (10 mg) by mouth daily 90 tablet 3     aspirin 325 MG EC tablet Take 1 tablet by mouth daily. 100 tablet 3     BETIMOL OP qd       CENTRUM SILVER OR TABS 1 TABLET DAILY       citalopram (CELEXA) 20 MG tablet Take 1 tablet (20 mg) by mouth daily (Patient taking differently: Take 20 mg by mouth daily as needed ) 90 tablet 1     levothyroxine (SYNTHROID/LEVOTHROID) 137 MCG tablet Take 1 tablet (137 mcg) by mouth daily 90 tablet 3     simvastatin (ZOCOR) 20 MG tablet Take 1 tablet (20 mg) by mouth At Bedtime 90 tablet 3     XALATAN 0.005 % OP SOLN qd       No facility-administered encounter medications on file as of 11/11/2019.        Review Of Systems:  Skin: As above  Eyes: negative  Ears/Nose/Throat: negative  Respiratory: No shortness of breath, dyspnea on exertion, cough, or hemoptysis  Cardiovascular: negative  Gastrointestinal: negative  Genitourinary: negative  Musculoskeletal:  negative  Neurologic: negative  Psychiatric: negative  Hematologic/Lymphatic/Immunologic: negative  Endocrine: negative      Objective:     /72   Pulse 65   SpO2 99%   Eyes: Conjunctivae/lids: Normal   ENT: Lips:  Normal  MSK: Normal  Cardiovascular: Peripheral edema none  Pulm: Breathing Normal  Neuro/Psych: Orientation: Normal; Mood/Affect: Normal, NAD, WDWN  Pt accompanied by: self  Following areas examined: Scalp, face, eyelids, lips, neck, chest, abdomen, back, UE. Pt defers waist and below exam  Prieto skin type:ii   Findings:  Red smooth well-defined macules on trunk and U extremities.  Brown, stuck-on scaly appearing papules on trunk and U extremities.  Well circumscribed macules with symmetric color distribution on trunk and U extremities.  Evans WD smooth macules on face, neck, trunk, and U extremities.  Blue smooth papule on left zygoma 0.5cm  Smooth flesh and tan papule on left lateral inferior eyelid 0.3cm  Pink scaly macule/s on face, ears, forearms, dorsal hands x 11 total  Rhytides, hypo/hyperpigmentation, and atrophy    Assessment and Plan:     1) Cherry angiomas, Seborrheic keratoses, Benign nevi, Lentigines     I discussed the specifics of tumor, prognosis, and genetics of benign lesions.  I explained that treatment of these lesions would be purely cosmetic and not medically neccessary.  I discussed with patient different removal options including excision, cryotherapy, cautery and /or laser.  Lesion may recur and/or may not completely resolve. May need additional treatment.     2) Neoplasm of uncertain behavior left zygoma 0.5cm  Hemangioma vs bcc doubt MM  TANGENTIAL BIOPSY:  After consent, anesthesia with LEC and prep, tangential biopsy performed.  No complications and routine wound care.  May grow back and will get a scar. Based on lesion type may need to completely remove lesion. Patient will be notified in 7-10 days of results. Wound care directions given.    3) Neoplasm of  uncertain behavior left lateral inferior eyelid 0.3cm  Benign nevus doubt bcc  TANGENTIAL BIOPSY:  After consent, anesthesia with LEC and prep, tangential biopsy performed.  No complications and routine wound care.  May grow back and will get a scar. Based on lesion type may need to completely remove lesion. Patient will be notified in 7-10 days of results. Wound care directions given.    4) Actinic keratoses x 11 and solar elastosis    LN2 for 5 seconds x 2. Discussed AE include hypopigmentation (white spot) and recurrence. Follow up in 2-3 months to recheck lesions. There is a risk of AKs developing into a SCC.   Treatment options include LN2 vs PDT vs Efudex. Pt elected LN2    5) ISK x 8  Benign etiology and course of lesion.  LN2: Treated with LN2 for 5s for 1-2 cycles. Warned risks of blistering, pain, pigment change, scarring, and incomplete resolution.  Advised patient to return if lesions do not completely resolve within 2-3 months.  Wound care sheet given.  Signs and Symptoms of non-melanoma skin cancer and ABCDEs of melanoma reviewed with patient. Patient encouraged to perform monthly self skin exams and educated on how to perform them. UV precautions reviewed with patient. Patient was asked about new or changing moles/lesions on body.   Wear a sunscreen with at least SPF 30 on your face, ears, neck and V of the chest daily. Wear sunscreen on other areas of the body if those areas are exposed to the sun throughout the day. Sunscreens can contain physical and/or chemical blockers. Physical blockers are less likely to clog pores, these include zinc oxide and titanium dioxide. Reapply every two hour and after swimming. Sunscreen examples include Neutrogena, CeraVe, Blue Lizard, Elta MD and many others.    Proper skin care from [facility]:    -Eliminate harsh soaps as they strip the natural oils from the skin, often resulting in dry itchy skin ( i.e. Dial, Zest, [place])  -Use mild soaps such as  Cetaphil or Dove Sensitive Skin in the shower. You do not need to use soap on arms, legs, and trunk every time you shower unless visibly soiled.   -Avoid hot or cold showers.  -After showering, lightly dry off and apply moisturizing within 2-3 minutes. This will help trap moisture in the skin.   -Aggressive use of a moisturizer at least 1-2 times a day to the entire body (including -Vanicream, Cetaphil, Aquaphor or Cerave) and moisturize hands after every washing.  -We recommend using moisturizers that come in a tub that needs to be scooped out, not a pump. This has more of an oil base. It will hold moisture in your skin much better than a water base moisturizer. The above recommended are non-pore clogging.               Follow up in 2-3 months if aks not resolved.      Again, thank you for allowing me to participate in the care of your patient.        Sincerely,        Sandee Butt PA-C

## 2019-11-13 LAB — COPATH REPORT: NORMAL

## 2019-11-27 ENCOUNTER — TELEPHONE (OUTPATIENT)
Dept: DERMATOLOGY | Facility: CLINIC | Age: 84
End: 2019-11-27

## 2019-11-27 NOTE — TELEPHONE ENCOUNTER
Written by Sandee Butt PA-C on 11/13/2019  5:05 PM   Hi Lopez,     Biopsies show:   A. Skin, left zygoma:- Widely dilated vessels -blood vessel growth. This is a benign lesion that does not need any additional treatment.   If recurs please follow up for evalutaiton     B. Skin, left lateral inferior eyelid:   - Intradermal melanocytic nevus-Normal healthy mole-This is a benign lesion that does not need any additional treatment.     Please let me know if you have questions.     Thank you,     Bren Butt PA-C

## 2019-11-27 NOTE — TELEPHONE ENCOUNTER
Detailed message that results were sent via [employer] and left them on voice mail- read providers note as below    Lazara PAZRN BSN  [facility]  [phone]

## 2019-11-27 NOTE — TELEPHONE ENCOUNTER
Reason for Call:  Request for results:    Name of test or procedure: labs    Date of test of procedure: 11/11/2019    Location of the test or procedure: [facility]    OK to leave the result message on voice mail or with a family member? YES    Phone number Patient can be reached at:  Home number on file [phone] (home)    Additional comments: Requesting a call for his lab results.    Call taken on 11/27/2019 at 12:24 PM by ADDY GTZ

## 2019-12-12 ENCOUNTER — TELEPHONE (OUTPATIENT)
Dept: INTERNAL MEDICINE | Facility: CLINIC | Age: 84
End: 2019-12-12

## 2019-12-12 DIAGNOSIS — I10 ESSENTIAL HYPERTENSION, BENIGN: ICD-10-CM

## 2019-12-12 RX ORDER — AMLODIPINE BESYLATE 10 MG/1
10 TABLET ORAL DAILY
Qty: 90 TABLET | Refills: 3 | Status: SHIPPED | OUTPATIENT
Start: 2019-12-12 | End: 2020-07-15

## 2019-12-12 NOTE — TELEPHONE ENCOUNTER
"Patient dropped off letter to IM reception stating the following:    \"In late October you suggested increasing amlodipine to 10 mg. I have been taking my BP from Oct 25-Nov 15. Average 132/77,  high 142/84, low 113/70. I have enough pills for the next 2-3 wekes. Should I drop back to 5 mg or change to 10 mg? I will need new rx if staying with 10 mg. My pressure if the lowest in the AM and highest at night. \"      "

## 2020-01-06 ENCOUNTER — OFFICE VISIT (OUTPATIENT)
Dept: URGENT CARE | Facility: URGENT CARE | Age: 85
End: 2020-01-06
Payer: COMMERCIAL

## 2020-01-06 VITALS
SYSTOLIC BLOOD PRESSURE: 129 MMHG | OXYGEN SATURATION: 94 % | BODY MASS INDEX: 30.15 KG/M2 | RESPIRATION RATE: 16 BRPM | WEIGHT: 210.6 LBS | HEART RATE: 86 BPM | HEIGHT: 70 IN | DIASTOLIC BLOOD PRESSURE: 79 MMHG | TEMPERATURE: 98.4 F

## 2020-01-06 DIAGNOSIS — R09.81 CONGESTION OF PARANASAL SINUS: ICD-10-CM

## 2020-01-06 DIAGNOSIS — R07.0 THROAT PAIN: Primary | ICD-10-CM

## 2020-01-06 DIAGNOSIS — J34.89 SINUS DRAINAGE: ICD-10-CM

## 2020-01-06 LAB
DEPRECATED S PYO AG THROAT QL EIA: NORMAL
SPECIMEN SOURCE: NORMAL

## 2020-01-06 PROCEDURE — 87081 CULTURE SCREEN ONLY: CPT | Performed by: PHYSICIAN ASSISTANT

## 2020-01-06 PROCEDURE — 99214 OFFICE O/P EST MOD 30 MIN: CPT | Performed by: PHYSICIAN ASSISTANT

## 2020-01-06 PROCEDURE — 87880 STREP A ASSAY W/OPTIC: CPT | Performed by: PHYSICIAN ASSISTANT

## 2020-01-06 RX ORDER — AMOXICILLIN 875 MG
875 TABLET ORAL 2 TIMES DAILY
Qty: 20 TABLET | Refills: 0 | Status: SHIPPED | OUTPATIENT
Start: 2020-01-06 | End: 2020-01-29

## 2020-01-06 ASSESSMENT — MIFFLIN-ST. JEOR: SCORE: 1651.53

## 2020-01-07 LAB
BACTERIA SPEC CULT: NORMAL
SPECIMEN SOURCE: NORMAL

## 2020-01-07 NOTE — PROGRESS NOTES
"SUBJECTIVE:   Abundio Beckett is a 84 year old male presenting with a chief complaint of sinus congestion, sore throat.  Onset of symptoms was 2 week(s) ago.  Course of illness is worsening.    Severity moderate  Current and Associated symptoms: runny nose, stuffy nose, cough - non-productive and facial pain/pressure  Treatment measures tried include OTC Cough med.  Predisposing factors include recent illness .    Past Medical History:   Diagnosis Date     BENIGN PROSTATIC HYPERTROPHY 9/17/2002     Essential hypertension, benign 11/8/2016     Hyperlipidemia LDL goal <100 5/29/2012     Hypertrophy (benign) of prostate     abstracted 7/5/02.     HYPOTHYROIDISM NOS 9/17/2002     Lacunar infarction (H) 9/4/2012     Unspecified hypothyroidism     abstracted 7/5/02.        Allergies   Allergen Reactions     No Known Drug Allergies      Family Hx  HTN  Hypothryoid    Social History     Tobacco Use     Smoking status: Never Smoker     Smokeless tobacco: Never Used   Substance Use Topics     Alcohol use: Yes     Frequency: 2-4 times a month     Drinks per session: 1 or 2     Binge frequency: Never       ROS:  CONSTITUTIONAL:NEGATIVE for fever, chills, change in weight  INTEGUMENTARY/SKIN: NEGATIVE for worrisome rashes, moles or lesions  EYES: NEGATIVE for vision changes or irritation  ENT/MOUTH: POSITIVE for nasal congestion  RESP:POSITIVE for cough-non productive  CV: NEGATIVE for chest pain, palpitations or peripheral edema  GI: NEGATIVE for nausea, abdominal pain, heartburn, or change in bowel habits  : negative for and dysuria  MUSCULOSKELETAL: NEGATIVE for significant arthralgias or myalgia  NEURO: NEGATIVE for weakness, dizziness or paresthesias    OBJECTIVE  :/79   Pulse 86   Temp 98.4  F (36.9  C) (Oral)   Resp 16   Ht 1.778 m (5' 10\")   Wt 95.5 kg (210 lb 9.6 oz)   SpO2 94%   BMI 30.22 kg/m    GENERAL APPEARANCE: healthy, alert and no distress  EYES: EOMI,  PERRL, conjunctiva clear  HENT: TM's normal " bilaterally, nasal turbinates erythematous, swollen and rhinorrhea yellow  NECK: supple, nontender, no lymphadenopathy  RESP: lungs clear to auscultation - no rales, rhonchi or wheezes  CV: regular rates and rhythm, normal S1 S2, no murmur noted  ABDOMEN:  soft, nontender, no HSM or masses and bowel sounds normal  NEURO: Normal strength and tone, sensory exam grossly normal,  normal speech and mentation  SKIN: no suspicious lesions or rashes    Results for orders placed or performed in visit on 01/06/20   Strep, Rapid Screen     Status: None   Result Value Ref Range    Specimen Description Throat     Rapid Strep A Screen       NEGATIVE: No Group A streptococcal antigen detected by immunoassay, await culture report.       ASSESSMENT/PLAN      ICD-10-CM    1. Throat pain R07.0 Strep, Rapid Screen     Beta strep group A culture   2. Sinus drainage J34.89 amoxicillin (AMOXIL) 875 MG tablet   3. Congestion of paranasal sinus R09.81 amoxicillin (AMOXIL) 875 MG tablet       Orders Placed This Encounter     amoxicillin (AMOXIL) 875 MG tablet     Saline nasal spray  Tylenol  Follow up as needed  See orders in Epic

## 2020-01-27 ENCOUNTER — NURSE TRIAGE (OUTPATIENT)
Dept: INTERNAL MEDICINE | Facility: CLINIC | Age: 85
End: 2020-01-27

## 2020-01-27 NOTE — TELEPHONE ENCOUNTER
"    Additional Information    Negative: Severe difficulty breathing (e.g., struggling for each breath, speaks in single words)    Negative: Sounds like a life-threatening emergency to the triager    Negative: Throat culture results, call about    Negative: Productive cough is the main symptom    Negative: Runny nose is the main symptom    Negative: Difficulty breathing (per caller) but not severe    Negative: Fever > 103 F (39.4 C)    Negative: Refuses to drink anything for > 12 hours    Negative: SEVERE sore throat pain    Negative: Pus on tonsils (back of throat) and swollen neck lymph nodes ('glands')    Negative: Earache also present    Negative: Widespread rash (especially chest and abdomen)    Negative: Diabetes mellitus or weak immune system (e.g., HIV positive, cancer chemo, splenectomy, organ transplant, chronic steroids)    Negative: History of rheumatic fever    Negative: Patient wants to be seen    Negative: Fever present > 3 days (72 hours)    Negative: Patient requesting a strep throat test    Negative: Strep exposure within last 10 days    Negative: Sore throat is the main symptom and persists > 48 hours    Negative: Sore throat with cough/cold symptoms present > 5 days    Sore throat    Answer Assessment - Initial Assessment Questions  1. ONSET: \"When did the throat start hurting?\" (Hours or days ago)       1/6/2020  2. SEVERITY: \"How bad is the sore throat?\" (Scale 1-10; mild, moderate or severe)    - MILD (1-3):  doesn't interfere with eating or normal activities    - MODERATE (4-7): interferes with eating some solids and normal activities    - SEVERE (8-10):  excruciating pain, interferes with most normal activities    - SEVERE DYSPHAGIA: can't swallow liquids, drooling      Mild, can still eat and swallow   3. STREP EXPOSURE: \"Has there been any exposure to strep within the past week?\" If so, ask: \"What type of contact occurred?\"       No. Hx of negative strep test as seen in urgent care  4.  " "VIRAL SYMPTOMS: \"Are there any symptoms of a cold, such as a runny nose, cough, hoarse voice or red eyes?\"       Now has dry cough. Cough becomes more laughing. Voice seems more hoarse. No red eyes. Coughing comes and goes. Dry cough. Rare mucus production  5. FEVER: \"Do you have a fever?\" If so, ask: \"What is your temperature, how was it measured, and when did it start?\"      No  6. PUS ON THE TONSILS: \"Is there pus on the tonsils in the back of your throat?\"      No can't see anything.  7. OTHER SYMPTOMS: \"Do you have any other symptoms?\" (e.g., difficulty breathing, headache, rash)      No SOB. No headache. Mild nasal congestion. Can hear occasional wheezing at night.  8. PREGNANCY: \"Is there any chance you are pregnant?\" \"When was your last menstrual period?\"      No    Protocols used: SORE THROAT-A-OH      "

## 2020-01-29 ENCOUNTER — OFFICE VISIT (OUTPATIENT)
Dept: INTERNAL MEDICINE | Facility: CLINIC | Age: 85
End: 2020-01-29
Payer: COMMERCIAL

## 2020-01-29 VITALS
BODY MASS INDEX: 29.41 KG/M2 | WEIGHT: 205 LBS | SYSTOLIC BLOOD PRESSURE: 122 MMHG | OXYGEN SATURATION: 98 % | HEART RATE: 64 BPM | RESPIRATION RATE: 16 BRPM | DIASTOLIC BLOOD PRESSURE: 62 MMHG | TEMPERATURE: 98.4 F

## 2020-01-29 DIAGNOSIS — I10 ESSENTIAL HYPERTENSION, BENIGN: ICD-10-CM

## 2020-01-29 DIAGNOSIS — J30.9 ALLERGIC RHINITIS, UNSPECIFIED SEASONALITY, UNSPECIFIED TRIGGER: Primary | ICD-10-CM

## 2020-01-29 PROCEDURE — 99214 OFFICE O/P EST MOD 30 MIN: CPT | Performed by: INTERNAL MEDICINE

## 2020-01-29 RX ORDER — FLUTICASONE PROPIONATE 50 MCG
2 SPRAY, SUSPENSION (ML) NASAL DAILY
Qty: 15.8 ML | Refills: 1 | Status: SHIPPED | OUTPATIENT
Start: 2020-01-29 | End: 2022-01-11

## 2020-01-29 NOTE — PROGRESS NOTES
Subjective     Abundio Beckett is a 84 year old male who presents to clinic today for the following health issues:    HPI     ED/UC Followup:    Facility:  [facility]  Date of visit: 01/06/2020  Reason for visit: Throat Pain, Sinus Drainage, Congestion   Current Status: Patient states not better than before UC visit     Patient was seen in the urgent care on 1/6/2020 for complaints of sinus congestion and a sore throat for 2 weeks.    It is my understanding he was given a full course of amoxicillin.  He states he has had his symptoms for about 2 months.  There is nothing that is really precipitating the symptoms that he can identify.  They are not profoundly worse with food or classic of reflux although he does states that he has had some issues with postnasal drainage and wonders if this may be a component.    States he is also tried CBD oil without any improvement of his symptoms.    Noted prior CXr normal 10/2019.    Patient Active Problem List   Diagnosis     Hypertrophy (benign) of prostate     Allergic rhinitis     Advance Care Planning     Hyperlipidemia LDL goal <100     Hyperglycemia     Lacunar infarction (H)     Hypothyroidism, unspecified type     Essential hypertension, benign     Past Surgical History:   Procedure Laterality Date     C REMV PROSTATE,PERINEAL,RADICAL  2000     HC SHOULDER ARTHROSCOPY, DX         Social History     Tobacco Use     Smoking status: Never Smoker     Smokeless tobacco: Never Used   Substance Use Topics     Alcohol use: Yes     Frequency: 2-4 times a month     Drinks per session: 1 or 2     Binge frequency: Never     No family history on file.      Current Outpatient Medications   Medication Sig Dispense Refill     amLODIPine (NORVASC) 10 MG tablet Take 1 tablet (10 mg) by mouth daily 90 tablet 3     aspirin 325 MG EC tablet Take 1 tablet by mouth daily. 100 tablet 3     BETIMOL OP qd       CENTRUM SILVER OR TABS 1 TABLET DAILY       citalopram (CELEXA) 20 MG tablet  Take 1 tablet (20 mg) by mouth daily (Patient not taking: Reported on 1/6/2020) 90 tablet 1     levothyroxine (SYNTHROID/LEVOTHROID) 137 MCG tablet Take 1 tablet (137 mcg) by mouth daily 90 tablet 3     simvastatin (ZOCOR) 20 MG tablet Take 1 tablet (20 mg) by mouth At Bedtime 90 tablet 3     XALATAN 0.005 % OP SOLN qd       Allergies   Allergen Reactions     No Known Drug Allergies      BP Readings from Last 3 Encounters:   01/06/20 129/79   11/11/19 123/72   10/23/19 130/70    Wt Readings from Last 3 Encounters:   01/06/20 95.5 kg (210 lb 9.6 oz)   10/23/19 92.5 kg (204 lb)   10/07/19 93.5 kg (206 lb 1.6 oz)           Reviewed and updated as needed this visit by Provider         Review of Systems   ROS COMP: CONSTITUTIONAL: NEGATIVE for fever, chills, change in weight  EYES: NEGATIVE for vision changes or irritation  RESP: NEGATIVE for significant cough or SOB  CV: NEGATIVE for chest pain, palpitations or peripheral edema  GI: NEGATIVE for nausea, abdominal pain, heartburn, or change in bowel habits  : NEGATIVE for frequency, dysuria, or hematuria  MUSCULOSKELETAL: NEGATIVE for significant arthralgias or myalgia  NEURO: NEGATIVE for weakness, dizziness or paresthesias  HEME: NEGATIVE for bleeding problems  PSYCHIATRIC: NEGATIVE for changes in mood or affect      Objective    /62   Pulse 64   Temp 98.4  F (36.9  C) (Temporal)   Resp 16   Wt 93 kg (205 lb)   SpO2 98%   BMI 29.41 kg/m    Body mass index is 29.41 kg/m .  Physical Exam   GENERAL: healthy, alert and no distress  EYES: Eyes grossly normal to inspection, PERRL and conjunctivae and sclerae normal  HENT: ear canals and TM's normal, nose and mouth without ulcers or lesions  NECK: no adenopathy, no asymmetry, masses, or scars and thyroid normal to palpation  RESP: lungs clear to auscultation - no rales, rhonchi or wheezes  CV: regular rate and rhythm, normal S1 S2, no S3 or S4, no murmur, click or rub, no peripheral edema and peripheral pulses  strong  MS: no gross musculoskeletal defects noted, no edema  NEURO: Normal strength and tone, mentation intact and speech normal  PSYCH: mentation appears normal, affect normal/bright        Assessment & Plan     1. Allergic rhinitis, unspecified seasonality, unspecified trigger  I suggested a trial of a nasal inhaler with 2 puffs to each nostril daily for the next 3 to 4 weeks and if no improvement he may benefit from seeing 1 of the ear nose and throat specialist for further evaluation.  - OTOLARYNGOLOGY REFERRAL  - fluticasone (FLONASE) 50 MCG/ACT nasal spray; Spray 2 sprays into both nostrils daily  Dispense: 15.8 mL; Refill: 1    2. Essential hypertension, benign  Stable currently continuing with medical management as noted.      See Patient Instructions    Return in about 1 month (around 2/29/2020) for f/u with routine sub-specialist.    Colton Chris MD  [facility]

## 2020-02-05 ENCOUNTER — OFFICE VISIT (OUTPATIENT)
Dept: INTERNAL MEDICINE | Facility: CLINIC | Age: 85
End: 2020-02-05
Payer: COMMERCIAL

## 2020-02-05 VITALS
SYSTOLIC BLOOD PRESSURE: 122 MMHG | BODY MASS INDEX: 29.56 KG/M2 | OXYGEN SATURATION: 98 % | RESPIRATION RATE: 16 BRPM | DIASTOLIC BLOOD PRESSURE: 68 MMHG | TEMPERATURE: 97.7 F | HEART RATE: 70 BPM | WEIGHT: 206 LBS

## 2020-02-05 DIAGNOSIS — R31.0 GROSS HEMATURIA: ICD-10-CM

## 2020-02-05 DIAGNOSIS — N30.01 ACUTE CYSTITIS WITH HEMATURIA: Primary | ICD-10-CM

## 2020-02-05 DIAGNOSIS — Z85.46 HISTORY OF PROSTATE CANCER: ICD-10-CM

## 2020-02-05 LAB
ALBUMIN UR-MCNC: 100 MG/DL
APPEARANCE UR: ABNORMAL
BACTERIA #/AREA URNS HPF: ABNORMAL /HPF
BILIRUB UR QL STRIP: NEGATIVE
COLOR UR AUTO: ABNORMAL
GLUCOSE UR STRIP-MCNC: NEGATIVE MG/DL
HGB UR QL STRIP: ABNORMAL
KETONES UR STRIP-MCNC: ABNORMAL MG/DL
LEUKOCYTE ESTERASE UR QL STRIP: ABNORMAL
NITRATE UR QL: NEGATIVE
PH UR STRIP: 6 PH (ref 5–7)
RBC #/AREA URNS AUTO: >100 /HPF
SOURCE: ABNORMAL
SP GR UR STRIP: >1.03 (ref 1–1.03)
UROBILINOGEN UR STRIP-ACNC: 1 EU/DL (ref 0.2–1)
WBC #/AREA URNS AUTO: ABNORMAL /HPF

## 2020-02-05 PROCEDURE — 99214 OFFICE O/P EST MOD 30 MIN: CPT | Performed by: PHYSICIAN ASSISTANT

## 2020-02-05 PROCEDURE — 87186 SC STD MICRODIL/AGAR DIL: CPT | Performed by: PHYSICIAN ASSISTANT

## 2020-02-05 PROCEDURE — 87086 URINE CULTURE/COLONY COUNT: CPT | Performed by: PHYSICIAN ASSISTANT

## 2020-02-05 PROCEDURE — 81001 URINALYSIS AUTO W/SCOPE: CPT | Performed by: PHYSICIAN ASSISTANT

## 2020-02-05 PROCEDURE — 87088 URINE BACTERIA CULTURE: CPT | Performed by: PHYSICIAN ASSISTANT

## 2020-02-05 RX ORDER — CIPROFLOXACIN 500 MG/1
500 TABLET, FILM COATED ORAL 2 TIMES DAILY
Qty: 14 TABLET | Refills: 0 | Status: SHIPPED | OUTPATIENT
Start: 2020-02-05 | End: 2020-02-10 | Stop reason: ALTCHOICE

## 2020-02-05 NOTE — PROGRESS NOTES
Subjective     Abundio Beckett is a 84 year old male who presents to clinic today for the following health issues:    HPI   Genitourinary - Male  Onset: 2 days    Description:   Dysuria (painful urination): YES  Hematuria (blood in urine): YES  Frequency: no   Are you urinating at night : no   Hesitancy (delay in urine): YES- occasional  Retention (unable to empty): no   Decrease in urinary flow: no   Incontinence: no     Progression of Symptoms:  worsening    Accompanying Signs & Symptoms:  Fever: no   Back/Flank pain: no   Urethral discharge: no   Testicle lumps/masses/pain: no   Nausea and/or vomiting: no   Abdominal pain: no     History:   History of frequent UTI's: no   History of kidney stones: no   History of hernias: no   Personal or Family history of Prostate problems: YES- self - prostate cancer- prostatectomy   Sexually active: no     Precipitating factors:   None    Alleviating factors:  None    -------------------------------------    BP Readings from Last 3 Encounters:   02/05/20 122/68   01/29/20 122/62   01/06/20 129/79    Wt Readings from Last 3 Encounters:   02/05/20 93.4 kg (206 lb)   01/29/20 93 kg (205 lb)   01/06/20 95.5 kg (210 lb 9.6 oz)                    -------------------------------------  Reviewed and updated as needed this visit by Provider  Allergies  Meds         Review of Systems   ROS COMP: Constitutional, HEENT, cardiovascular, pulmonary, gi and gu systems are negative, except as otherwise noted.      Objective    /68 (BP Location: Left arm, Patient Position: Chair, Cuff Size: Adult Regular)   Pulse 70   Temp 97.7  F (36.5  C) (Oral)   Resp 16   Wt 93.4 kg (206 lb)   SpO2 98%   BMI 29.56 kg/m    Body mass index is 29.56 kg/m .  Physical Exam   GENERAL: healthy, alert and no distress  RESP: lungs clear to auscultation - no rales, rhonchi or wheezes  CV: regular rates and rhythm and normal S1 S2, no S3 or S4  MS: no gross musculoskeletal defects noted, no edema  SKIN:  no suspicious lesions or rashes    Diagnostic Test Results:  Results for orders placed or performed in visit on 02/05/20 (from the past 24 hour(s))   UA reflex to Microscopic and Culture   Result Value Ref Range    Color Urine Red     Appearance Urine Cloudy     Glucose Urine Negative NEG^Negative mg/dL    Bilirubin Urine Negative NEG^Negative    Ketones Urine Trace (A) NEG^Negative mg/dL    Specific Gravity Urine >1.030 1.003 - 1.035    Blood Urine Large (A) NEG^Negative    pH Urine 6.0 5.0 - 7.0 pH    Protein Albumin Urine 100 (A) NEG^Negative mg/dL    Urobilinogen Urine 1.0 0.2 - 1.0 EU/dL    Nitrite Urine Negative NEG^Negative    Leukocyte Esterase Urine Small (A) NEG^Negative    Source Midstream Urine    Urine Microscopic   Result Value Ref Range    WBC Urine 0 - 5 OTO5^0 - 5 /HPF    RBC Urine >100 (A) OTO2^O - 2 /HPF    Bacteria Urine Few (A) NEG^Negative /HPF           Assessment & Plan     1. Acute cystitis with hematuria    - Urine Culture Aerobic Bacterial  - ciprofloxacin (CIPRO) 500 MG tablet; Take 1 tablet (500 mg) by mouth 2 times daily  Dispense: 14 tablet; Refill: 0    2. Gross hematuria    - UA reflex to Microscopic and Culture  - Urine Microscopic  - ciprofloxacin (CIPRO) 500 MG tablet; Take 1 tablet (500 mg) by mouth 2 times daily  Dispense: 14 tablet; Refill: 0    3: HX of prostate cancer        Patient Instructions   Culture back in a few days will call    If negative- repeat UA next week.         Return in about 9 months (around 11/5/2020) for Routine Visit, regular primary provider.    Hope Lazo PA-C  [facility]

## 2020-02-08 LAB
BACTERIA SPEC CULT: ABNORMAL
SPECIMEN SOURCE: ABNORMAL

## 2020-02-10 RX ORDER — AMOXICILLIN 500 MG/1
500 CAPSULE ORAL 3 TIMES DAILY
Qty: 21 CAPSULE | Refills: 0 | Status: SHIPPED | OUTPATIENT
Start: 2020-02-10 | End: 2020-06-05

## 2020-02-20 ENCOUNTER — TRANSFERRED RECORDS (OUTPATIENT)
Dept: HEALTH INFORMATION MANAGEMENT | Facility: CLINIC | Age: 85
End: 2020-02-20

## 2020-03-05 ENCOUNTER — TRANSFERRED RECORDS (OUTPATIENT)
Dept: HEALTH INFORMATION MANAGEMENT | Facility: CLINIC | Age: 85
End: 2020-03-05

## 2020-03-05 ENCOUNTER — HOSPITAL LABORATORY (OUTPATIENT)
Dept: OTHER | Facility: CLINIC | Age: 85
End: 2020-03-05

## 2020-03-08 LAB
BACTERIA SPEC CULT: ABNORMAL
Lab: ABNORMAL
SPECIMEN SOURCE: ABNORMAL

## 2020-04-28 ENCOUNTER — TELEPHONE (OUTPATIENT)
Dept: INTERNAL MEDICINE | Facility: CLINIC | Age: 85
End: 2020-04-28

## 2020-04-28 NOTE — TELEPHONE ENCOUNTER
4/8/2020 - fax from [employer] was faxed to Dr. Chris for:  Hereditary cancer testing.  Pt reached out to them, and requested to have this testing done.   Wants to see if he has any cancerous genes? Or anything that could be passed to his children and grandchildren.   Pt has hx of prostate cancer.    [employer] is going to re-fax the form to Dr. Chris ([phone]).

## 2020-04-28 NOTE — TELEPHONE ENCOUNTER
Patient is requesting orders for full hereditary cancer panel to be done with outside lab organization ([facility]). Forms faxed to clinic for physician to sign stating this testing is medically necessary. Forms in PCP's folder for review.

## 2020-04-28 NOTE — TELEPHONE ENCOUNTER
Called and discussed with patient and informed him that at the present time I do not feel it is medically necessary that he perform genetic screening.  Informed the patient and if he is interested in doing such for his own general knowledge that I am fine with that that he should forward me a copy of the results.

## 2020-05-05 DIAGNOSIS — F32.0 MILD MAJOR DEPRESSION (H): ICD-10-CM

## 2020-05-06 RX ORDER — CITALOPRAM HYDROBROMIDE 20 MG/1
20 TABLET ORAL DAILY PRN
Qty: 30 TABLET | Refills: 1 | COMMUNITY
Start: 2020-05-06 | End: 2020-05-22

## 2020-05-06 RX ORDER — CITALOPRAM HYDROBROMIDE 20 MG/1
TABLET ORAL
Qty: 90 TABLET | Refills: 1 | OUTPATIENT
Start: 2020-05-06

## 2020-05-06 NOTE — TELEPHONE ENCOUNTER
Unclear as to request as appears medication was discontinued by another provider.  Will need to verify.

## 2020-05-06 NOTE — TELEPHONE ENCOUNTER
Patient Contact    Attempt # 1    Was call answered?  No.  Left message on voicemail with information to call me back.    On call back:    -Please verify if pt is taking this medication

## 2020-05-12 ENCOUNTER — TELEPHONE (OUTPATIENT)
Dept: INTERNAL MEDICINE | Facility: CLINIC | Age: 85
End: 2020-05-12

## 2020-05-12 NOTE — TELEPHONE ENCOUNTER
Informed rep of PCP message from 4/28/2020 Telephone encounter.    Advised [facility] to discuss further with patient if he wishes to pursue the genetic screening for his own personal knowledge.    Constance FONTANAN, RN, PHN

## 2020-05-12 NOTE — TELEPHONE ENCOUNTER
Reason for Call:  Other     Detailed comments: A form was sent from [facility] and would like the status on this.  Is there going to be testing done    Phone Number Patient can be reached at: Rajeev  [phone]  Best Time: any    Can we leave a detailed message on this number? YES    Call taken on 5/12/2020 at 11:22 AM by MINDY LANTIGUA

## 2020-05-21 DIAGNOSIS — F32.0 MILD MAJOR DEPRESSION (H): ICD-10-CM

## 2020-05-22 ENCOUNTER — E-VISIT (OUTPATIENT)
Dept: INTERNAL MEDICINE | Facility: CLINIC | Age: 85
End: 2020-05-22
Payer: COMMERCIAL

## 2020-05-22 DIAGNOSIS — M25.559 ARTHRALGIA OF HIP, UNSPECIFIED LATERALITY: Primary | ICD-10-CM

## 2020-05-22 PROCEDURE — 99207 ZZC NO BILLABLE SERVICE THIS VISIT: CPT | Performed by: INTERNAL MEDICINE

## 2020-05-22 RX ORDER — CITALOPRAM HYDROBROMIDE 20 MG/1
TABLET ORAL
Qty: 90 TABLET | Refills: 1 | Status: SHIPPED | OUTPATIENT
Start: 2020-05-22 | End: 2022-01-11

## 2020-05-22 NOTE — TELEPHONE ENCOUNTER
Patient care team-    Please assist with completing PHQ-9.    Please call patient 2 times in attempt to schedule an appointment for ASAP.    If appointment scheduled, please check with patient to see if they have enough medication to get them to appointment.  Please route back to triage with the above information.    If unable to get a hold of patient, please route to the provider.       Constance HENSON, RN, PHN

## 2020-05-22 NOTE — TELEPHONE ENCOUNTER
PHQ-9 score:    PHQ 10/7/2019   PHQ-9 Total Score 0   Q9: Thoughts of better off dead/self-harm past 2 weeks Not at all

## 2020-06-05 NOTE — PROGRESS NOTES
"Abundio Beckett is a 84 year old male who is being evaluated via a billable video visit.      The patient has been notified of following:     \"This video visit will be conducted via a call between you and your physician/provider. We have found that certain health care needs can be provided without the need for an in-person physical exam.  This service lets us provide the care you need with a video conversation.  If a prescription is necessary we can send it directly to your pharmacy.  If lab work is needed we can place an order for that and you can then stop by our lab to have the test done at a later time.    Video visits are billed at different rates depending on your insurance coverage.  Please reach out to your insurance provider with any questions.    If during the course of the call the physician/provider feels a video visit is not appropriate, you will not be charged for this service.\"    Patient has given verbal consent for Video visit? Yes    How would you like to obtain your AVS? Shantal    Patient would like the video invitation sent by: Send to e-mail at: [email or web address]     Will anyone else be joining your video visit? No    Subjective     Abundio Beckett is a 84 year old male who presents today via video visit for the following health issues:    HPI  Hyperlipidemia Follow-Up      Are you regularly taking any medication or supplement to lower your cholesterol?   Yes- Simvastatin    Are you having muscle aches or other side effects that you think could be caused by your cholesterol lowering medication?  No    Depression Followup    How are you doing with your depression since your last visit? Stable     Are you having other symptoms that might be associated with depression? No    Have you had a significant life event?  No     Are you feeling anxious or having panic attacks?   No    Do you have any concerns with your use of alcohol or other drugs? No    Social History     Tobacco Use     Smoking " status: Never Smoker     Smokeless tobacco: Never Used   Substance Use Topics     Alcohol use: Yes     Frequency: 2-4 times a month     Drinks per session: 1 or 2     Binge frequency: Never     Drug use: No     PHQ 5/7/2019 10/7/2019 6/8/2020   PHQ-9 Total Score 8 0 2   Q9: Thoughts of better off dead/self-harm past 2 weeks Not at all Not at all Not at all     GRISEL-7 SCORE 5/6/2019 5/7/2019   Total Score 14 (moderate anxiety) -   Total Score 14 14     Last PHQ-9 6/8/2020   1.  Little interest or pleasure in doing things 1   2.  Feeling down, depressed, or hopeless 0   3.  Trouble falling or staying asleep, or sleeping too much 0   4.  Feeling tired or having little energy 0   5.  Poor appetite or overeating 1   6.  Feeling bad about yourself 0   7.  Trouble concentrating 0   8.  Moving slowly or restless 0   Q9: Thoughts of better off dead/self-harm past 2 weeks 0   PHQ-9 Total Score 2   Difficulty at work, home, or with people Not difficult at all     Suicide Assessment Five-step Evaluation and Treatment (SAFE-T)    Hypothyroidism Follow-up      Since last visit, patient describes the following symptoms: Weight stable, no hair loss, no skin changes, no constipation, no loose stools      How many servings of fruits and vegetables do you eat daily?  0-1    On average, how many sweetened beverages do you drink each day (Examples: soda, juice, sweet tea, etc.  Do NOT count diet or artificially sweetened beverages)?   0    How many days per week do you exercise enough to make your heart beat faster? 3 or less    How many minutes a day do you exercise enough to make your heart beat faster? 30 - 60    How many days per week do you miss taking your medication? 0     Other concerns:  1. Hip pain with prolonged walking, aching sensation (bone on bone feeling). Patient questioning if there is anything he can take for joint lubrication.  He states he is able to walk about 1 mile without any difficulty after he gets the 1 mile  he starts to have pain and discomfort in his hip..    Video Start Time: 1017    Patient Active Problem List   Diagnosis     Hypertrophy (benign) of prostate     Allergic rhinitis     Advance Care Planning     Hyperlipidemia LDL goal <100     Hyperglycemia     Lacunar infarction (H)     Hypothyroidism, unspecified type     Essential hypertension, benign     History of prostate cancer     Past Surgical History:   Procedure Laterality Date     C REMV PROSTATE,PERINEAL,RADICAL  2000     HC SHOULDER ARTHROSCOPY, DX         Social History     Tobacco Use     Smoking status: Never Smoker     Smokeless tobacco: Never Used   Substance Use Topics     Alcohol use: Yes     Frequency: 2-4 times a month     Drinks per session: 1 or 2     Binge frequency: Never     No family history on file.      Current Outpatient Medications   Medication Sig Dispense Refill     amLODIPine (NORVASC) 10 MG tablet Take 1 tablet (10 mg) by mouth daily 90 tablet 3     aspirin 325 MG EC tablet Take 1 tablet by mouth daily. 100 tablet 3     BETIMOL OP qd       CENTRUM SILVER OR TABS 1 TABLET DAILY       citalopram (CELEXA) 20 MG tablet TAKE 1 TABLET BY MOUTH  DAILY 90 tablet 1     fluticasone (FLONASE) 50 MCG/ACT nasal spray Spray 2 sprays into both nostrils daily 15.8 mL 1     levothyroxine (SYNTHROID/LEVOTHROID) 137 MCG tablet Take 1 tablet (137 mcg) by mouth daily 90 tablet 3     simvastatin (ZOCOR) 20 MG tablet Take 1 tablet (20 mg) by mouth At Bedtime 90 tablet 3     XALATAN 0.005 % OP SOLN qd       Allergies   Allergen Reactions     No Known Drug Allergies      BP Readings from Last 3 Encounters:   02/05/20 122/68   01/29/20 122/62   01/06/20 129/79    Wt Readings from Last 3 Encounters:   02/05/20 93.4 kg (206 lb)   01/29/20 93 kg (205 lb)   01/06/20 95.5 kg (210 lb 9.6 oz)           Reviewed and updated as needed this visit by Provider         Review of Systems   CONSTITUTIONAL: NEGATIVE for fever, chills, change in weight  EYES: NEGATIVE for  "vision changes or irritation  ENT/MOUTH: NEGATIVE for ear, mouth and throat problems  RESP: NEGATIVE for significant cough or SOB  CV: NEGATIVE for chest pain, palpitations or peripheral edema  GI: NEGATIVE for nausea, abdominal pain, heartburn, or change in bowel habits  : NEGATIVE for frequency, dysuria, or hematuria  NEURO: NEGATIVE for weakness, dizziness or paresthesias  ENDOCRINE: NEGATIVE for temperature intolerance, skin/hair changes  HEME: NEGATIVE for bleeding problems  PSYCHIATRIC: NEGATIVE for changes in mood or affect      Objective    Ht 1.778 m (5' 10\")   Wt 93 kg (205 lb)   BMI 29.41 kg/m    Estimated body mass index is 29.56 kg/m  as calculated from the following:    Height as of 1/6/20: 1.778 m (5' 10\").    Weight as of 2/5/20: 93.4 kg (206 lb).  Physical Exam     GENERAL: Healthy, alert and no distress  EYES: Eyes grossly normal to inspection.  No discharge or erythema, or obvious scleral/conjunctival abnormalities.  RESP: No audible wheeze, cough, or visible cyanosis.  No visible retractions or increased work of breathing.    SKIN: Visible skin clear. No significant rash, abnormal pigmentation or lesions.  NEURO: Cranial nerves grossly intact.  Mentation and speech appropriate for age.  PSYCH: Mentation appears normal, affect normal/bright, judgement and insight intact, normal speech and appearance well-groomed.        Component      Latest Ref Rng & Units 10/9/2019   Sodium      133 - 144 mmol/L 138   Potassium      3.4 - 5.3 mmol/L 4.3   Chloride      94 - 109 mmol/L 103   Carbon Dioxide      20 - 32 mmol/L 28   Anion Gap      3 - 14 mmol/L 7   Glucose      70 - 99 mg/dL 141 (H)   Urea Nitrogen      7 - 30 mg/dL 14   Creatinine      0.66 - 1.25 mg/dL 1.04   GFR Estimate      >60 mL/min/1.73:m2 66   GFR Estimate If Black      >60 mL/min/1.73:m2 76   Calcium      8.5 - 10.1 mg/dL 8.3 (L)   Bilirubin Total      0.2 - 1.3 mg/dL 0.9   Albumin      3.4 - 5.0 g/dL 3.9   Protein Total      6.8 - " "8.8 g/dL 7.0   Alkaline Phosphatase      40 - 150 U/L 66   ALT      0 - 70 U/L 15   AST      0 - 45 U/L 7   Cholesterol      <200 mg/dL 126   Triglycerides      <150 mg/dL 105   HDL Cholesterol      >39 mg/dL 50   LDL Cholesterol Calculated      <100 mg/dL 55   Non HDL Cholesterol      <130 mg/dL 76   TSH      0.40 - 4.00 mU/L 1.79   Hemoglobin      13.3 - 17.7 g/dL 14.8   Hemoglobin A1C      0 - 5.6 %        Lab Results   Component Value Date    A1C 5.3 10/23/2019     Assessment & Plan     1. Essential hypertension, benign  Stable on therapy, no changes    2. Hyperlipidemia LDL goal <100  Labs as noted and at goal.    LDL Cholesterol Calculated   Date Value Ref Range Status   10/09/2019 55 <100 mg/dL Final     Comment:     Desirable:       <100 mg/dl       3. Arthralgia of hip, unspecified laterality  Discussed options, consider Orthopedic assessment  - Orthopedic & Spine  Referral; Future    4. Hyperglycemia  Labs as fasting and recheck A1c  - Basic metabolic panel; Future  - Hemoglobin A1c; Future     BMI:   Estimated body mass index is 29.56 kg/m  as calculated from the following:    Height as of 1/6/20: 1.778 m (5' 10\").    Weight as of 2/5/20: 93.4 kg (206 lb).     See Patient Instructions    Return in about 2 weeks (around 6/22/2020) for if symptoms recur or worsen.    Coltno Chris MD  [facility]      Video-Visit Details    Type of service:  Video Visit    Video End Time:1038    Originating Location (pt. Location): Home    Distant Location (provider location):  [facility]     Platform used for Video Visit: Doximity    Return in about 2 weeks (around 6/22/2020) for if symptoms recur or worsen.       Colton Chris MD        "

## 2020-06-08 ENCOUNTER — VIRTUAL VISIT (OUTPATIENT)
Dept: INTERNAL MEDICINE | Facility: CLINIC | Age: 85
End: 2020-06-08
Payer: COMMERCIAL

## 2020-06-08 VITALS — HEIGHT: 70 IN | BODY MASS INDEX: 29.35 KG/M2 | WEIGHT: 205 LBS

## 2020-06-08 DIAGNOSIS — R73.9 HYPERGLYCEMIA: ICD-10-CM

## 2020-06-08 DIAGNOSIS — I10 ESSENTIAL HYPERTENSION, BENIGN: Primary | ICD-10-CM

## 2020-06-08 DIAGNOSIS — E78.5 HYPERLIPIDEMIA LDL GOAL <100: ICD-10-CM

## 2020-06-08 DIAGNOSIS — M25.559 ARTHRALGIA OF HIP, UNSPECIFIED LATERALITY: ICD-10-CM

## 2020-06-08 PROCEDURE — 99214 OFFICE O/P EST MOD 30 MIN: CPT | Mod: 95 | Performed by: INTERNAL MEDICINE

## 2020-06-08 RX ORDER — TIMOLOL MALEATE 5 MG/ML
1 SOLUTION/ DROPS OPHTHALMIC 2 TIMES DAILY
COMMUNITY
Start: 2020-03-11

## 2020-06-08 ASSESSMENT — PATIENT HEALTH QUESTIONNAIRE - PHQ9: SUM OF ALL RESPONSES TO PHQ QUESTIONS 1-9: 2

## 2020-06-08 ASSESSMENT — MIFFLIN-ST. JEOR: SCORE: 1626.12

## 2020-06-11 ENCOUNTER — TELEPHONE (OUTPATIENT)
Dept: INTERNAL MEDICINE | Facility: CLINIC | Age: 85
End: 2020-06-11

## 2020-06-11 NOTE — TELEPHONE ENCOUNTER
Reao time physician office called they are looking for some fax that has been faxed a few days ago for Dr Bliss to sign and fax back.

## 2020-06-15 NOTE — TELEPHONE ENCOUNTER
No fax found for patient and no return number to call and have them refax. Goggled fredy physician and found no such place.  Will wait for them to call back

## 2020-06-22 ENCOUNTER — VIRTUAL VISIT (OUTPATIENT)
Dept: INTERNAL MEDICINE | Facility: CLINIC | Age: 85
End: 2020-06-22
Payer: COMMERCIAL

## 2020-06-22 VITALS — BODY MASS INDEX: 29.35 KG/M2 | HEIGHT: 70 IN | WEIGHT: 205 LBS

## 2020-06-22 DIAGNOSIS — B34.9 VIRAL SYNDROME: ICD-10-CM

## 2020-06-22 DIAGNOSIS — R05.9 COUGH: Primary | ICD-10-CM

## 2020-06-22 PROCEDURE — 99212 OFFICE O/P EST SF 10 MIN: CPT | Mod: 95 | Performed by: INTERNAL MEDICINE

## 2020-06-22 ASSESSMENT — MIFFLIN-ST. JEOR: SCORE: 1626.12

## 2020-06-22 NOTE — PROGRESS NOTES
"Abundio Beckett is a 84 year old male who is being evaluated via a billable telephone visit.      The patient has been notified of following:     \"This telephone visit will be conducted via a call between you and your physician/provider. We have found that certain health care needs can be provided without the need for a physical exam.  This service lets us provide the care you need with a short phone conversation.  If a prescription is necessary we can send it directly to your pharmacy.  If lab work is needed we can place an order for that and you can then stop by our lab to have the test done at a later time.    Telephone visits are billed at different rates depending on your insurance coverage. During this emergency period, for some insurers they may be billed the same as an in-person visit.  Please reach out to your insurance provider with any questions.    If during the course of the call the physician/provider feels a telephone visit is not appropriate, you will not be charged for this service.\"    Patient has given verbal consent for Telephone visit?  Yes    What phone number would you like to be contacted at? [phone]    How would you like to obtain your AVS? MyChart    Subjective     Abundio Beckett is a 84 year old male who presents via phone visit today for the following health issues:    HPI  Chief Complaint   Patient presents with     Patient Request     patient requesting a COVID test, he states that he had been around about 20 different people over the weekend and has had a sore throat for a couple of days           Patient has had a very slight sore throat for last few days, very slight subjective fever though he has not measured a temperature.  He lives in an elderly living facility, and would like to be tested for COVID.    Denies obvious productive cough, denies myalgias, denies GI symptoms.        Reviewed and updated as needed this visit by Provider         Review of Systems "   Constitutional, HEENT, cardiovascular, pulmonary, GI, , musculoskeletal, neuro, skin, endocrine and psych systems are negative, except as otherwise noted.       Objective   Reported vitals:  There were no vitals taken for this visit.   healthy, alert and no distress  PSYCH: Alert and oriented times 3; coherent speech, normal   rate and volume, able to articulate logical thoughts, able   to abstract reason, no tangential thoughts, no hallucinations   or delusions  His affect is normal  RESP: No cough, no audible wheezing, able to talk in full sentences  Remainder of exam unable to be completed due to telephone visits    Diagnostic Test Results:  Labs reviewed in Epic        Assessment/Plan:  1. Cough    - Symptomatic COVID-19 Virus (Coronavirus) by PCR; Future    2. Viral syndrome    - Symptomatic COVID-19 Virus (Coronavirus) by PCR; Future    No follow-ups on file.      Phone call duration:  6 minutes    Jb Dougherty MD

## 2020-06-23 DIAGNOSIS — R73.9 HYPERGLYCEMIA: ICD-10-CM

## 2020-06-23 LAB
ANION GAP SERPL CALCULATED.3IONS-SCNC: 7 MMOL/L (ref 3–14)
BUN SERPL-MCNC: 12 MG/DL (ref 7–30)
CALCIUM SERPL-MCNC: 8.8 MG/DL (ref 8.5–10.1)
CHLORIDE SERPL-SCNC: 108 MMOL/L (ref 94–109)
CO2 SERPL-SCNC: 26 MMOL/L (ref 20–32)
CREAT SERPL-MCNC: 1.15 MG/DL (ref 0.66–1.25)
GFR SERPL CREATININE-BSD FRML MDRD: 58 ML/MIN/{1.73_M2}
GLUCOSE SERPL-MCNC: 102 MG/DL (ref 70–99)
HBA1C MFR BLD: 5.4 % (ref 0–5.6)
POTASSIUM SERPL-SCNC: 3.9 MMOL/L (ref 3.4–5.3)
SODIUM SERPL-SCNC: 141 MMOL/L (ref 133–144)

## 2020-06-23 PROCEDURE — 83036 HEMOGLOBIN GLYCOSYLATED A1C: CPT | Performed by: INTERNAL MEDICINE

## 2020-06-23 PROCEDURE — 36415 COLL VENOUS BLD VENIPUNCTURE: CPT | Performed by: INTERNAL MEDICINE

## 2020-06-23 PROCEDURE — 80048 BASIC METABOLIC PNL TOTAL CA: CPT | Performed by: INTERNAL MEDICINE

## 2020-06-24 ENCOUNTER — TELEPHONE (OUTPATIENT)
Dept: INTERNAL MEDICINE | Facility: CLINIC | Age: 85
End: 2020-06-24

## 2020-06-24 NOTE — TELEPHONE ENCOUNTER
Reason for Call:  Other     Detailed comments: Timoteo from [employer] wants to know if we receive any document from them regarding dementia panel for patient. If so, please fax [phone] back to them after signing.    Phone Number Patient can be reached at: Other phone number:  [phone]    Best Time:     Can we leave a detailed message on this number? YES    Call taken on 6/24/2020 at 12:55 PM by ADDY GTZ

## 2020-06-29 DIAGNOSIS — R05.9 COUGH: ICD-10-CM

## 2020-06-29 DIAGNOSIS — B34.9 VIRAL SYNDROME: ICD-10-CM

## 2020-06-29 PROCEDURE — U0003 INFECTIOUS AGENT DETECTION BY NUCLEIC ACID (DNA OR RNA); SEVERE ACUTE RESPIRATORY SYNDROME CORONAVIRUS 2 (SARS-COV-2) (CORONAVIRUS DISEASE [COVID-19]), AMPLIFIED PROBE TECHNIQUE, MAKING USE OF HIGH THROUGHPUT TECHNOLOGIES AS DESCRIBED BY CMS-2020-01-R: HCPCS | Performed by: INTERNAL MEDICINE

## 2020-06-29 NOTE — PROGRESS NOTES
COVID-19 PCR test completed. Patient handout For Patients Who Have Been Tested for Covid-19 (Coronavirus) was given to the patient, which includes test result notification process.     Belkis Burris  1939  Ordering Provider:  RENE Reece  Medication:  Praluent Pen 75MG/ML  Date RX Written:  11/07/2019  Additional notes:  This patient has no prescription coverage (only Medicare A & B). Please pursue free drug through the Patient Assistance Support (PASS) PAP Program. We have not contacted the patient in hopes to avoid confusion.    If you have additional questions, please call [facility] at [phone].

## 2020-06-29 NOTE — LETTER
July 1, 2020        Abundio Beckett  [id number]   [facility] [address]    This letter provides a written record that you were tested for COVID-19 on 6/29/20.       Your result was negative. This means that we didn t find the virus that causes COVID-19 in your sample. A test may show negative when you do actually have the virus. This can happen when the virus is in the early stages of infection, before you feel illness symptoms.    If you have symptoms   Stay home and away from others (self-isolate) until you meet ALL of the guidelines below:    You ve had no fever--and no medicine that reduces fever--for 3 full days (72 hours). And      Your other symptoms have gotten better. For example, your cough or breathing has improved. And     At least 10 days have passed since your symptoms started.    During this time:    Stay home. Don t go to work, school or anywhere else.     Stay in your own room, including for meals. Use your own bathroom if you can.    Stay away from others in your home. No hugging, kissing or shaking hands. No visitors.    Clean  high touch  surfaces often (doorknobs, counters, handles, etc.). Use a household cleaning spray or wipes. You can find a full list on the EPA website at www.epa.gov/pesticide-registration/list-n-disinfectants-use-against-sars-cov-2.    Cover your mouth and nose with a mask, tissue or washcloth to avoid spreading germs.    Wash your hands and face often with soap and water.    Going back to work  Check with your employer for any guidelines to follow for going back to work.    Employers: This document serves as formal notice that your employee tested negative for COVID-19, as of the testing date shown above.

## 2020-06-30 LAB
SARS-COV-2 RNA SPEC QL NAA+PROBE: NOT DETECTED
SPECIMEN SOURCE: NORMAL

## 2020-07-15 ENCOUNTER — OFFICE VISIT (OUTPATIENT)
Dept: INTERNAL MEDICINE | Facility: CLINIC | Age: 85
End: 2020-07-15
Payer: COMMERCIAL

## 2020-07-15 VITALS
SYSTOLIC BLOOD PRESSURE: 130 MMHG | HEART RATE: 69 BPM | TEMPERATURE: 98.2 F | DIASTOLIC BLOOD PRESSURE: 68 MMHG | RESPIRATION RATE: 16 BRPM | BODY MASS INDEX: 29.92 KG/M2 | WEIGHT: 208.5 LBS | OXYGEN SATURATION: 97 %

## 2020-07-15 DIAGNOSIS — R60.9 EDEMA, UNSPECIFIED TYPE: Primary | ICD-10-CM

## 2020-07-15 DIAGNOSIS — I10 ESSENTIAL HYPERTENSION, BENIGN: ICD-10-CM

## 2020-07-15 PROCEDURE — 99214 OFFICE O/P EST MOD 30 MIN: CPT | Performed by: INTERNAL MEDICINE

## 2020-07-15 RX ORDER — FUROSEMIDE 20 MG
20 TABLET ORAL DAILY
Qty: 30 TABLET | Refills: 0 | Status: SHIPPED | OUTPATIENT
Start: 2020-07-15 | End: 2020-08-13

## 2020-07-15 RX ORDER — AMLODIPINE BESYLATE 10 MG/1
5 TABLET ORAL DAILY
Qty: 45 TABLET | Refills: 3 | Status: SHIPPED | OUTPATIENT
Start: 2020-07-15 | End: 2020-08-17 | Stop reason: DRUGHIGH

## 2020-07-15 NOTE — PROGRESS NOTES
Subjective     Abundio Beckett is a 84 year old male who presents to clinic today for the following health issues:    HPI     Swelling       Duration: Unable to say     Description (location/character/radiation): Bilateral lower legs/ swelling, tightness in skin    Intensity:  mild    Accompanying signs and symptoms: left calf feels like it is about to cramp     History (similar episodes/previous evaluation): None    Precipitating or alleviating factors: None    Therapies tried and outcome: None. Granddaughter is NP and recommended diuretic      He is primarily here because his granddaughter is a nurse practitioner and happened to notice that his feet was slightly swollen.  He would not be here if it was not for her comments.  He reports that he does not have any shortness of breath, dyspnea on exertion and he feels exceedingly well.  He reports that the swelling is slightly worse when he is dependent.  He is able to sleep comfortably without issues of concern.  He does not take prolonged anti-inflammatories.    His most recent creatinine as noted below.  His prior labs demonstrated a normal albumin and liver function profile.    Creatinine   Date Value Ref Range Status   06/23/2020 1.15 0.66 - 1.25 mg/dL Final       Patient Active Problem List   Diagnosis     Hypertrophy (benign) of prostate     Allergic rhinitis     Advance Care Planning     Hyperlipidemia LDL goal <100     Hyperglycemia     Lacunar infarction (H)     Hypothyroidism, unspecified type     Essential hypertension, benign     History of prostate cancer     Past Surgical History:   Procedure Laterality Date     C REMV PROSTATE,PERINEAL,RADICAL  2000     HC SHOULDER ARTHROSCOPY, DX         Social History     Tobacco Use     Smoking status: Never Smoker     Smokeless tobacco: Never Used   Substance Use Topics     Alcohol use: Yes     Frequency: 2-4 times a month     Drinks per session: 1 or 2     Binge frequency: Never     No family history on file.       Current Outpatient Medications   Medication Sig Dispense Refill     amLODIPine (NORVASC) 10 MG tablet Take 1 tablet (10 mg) by mouth daily 90 tablet 3     aspirin 325 MG EC tablet Take 1 tablet by mouth daily. 100 tablet 3     CENTRUM SILVER OR TABS 1 TABLET DAILY       citalopram (CELEXA) 20 MG tablet TAKE 1 TABLET BY MOUTH  DAILY (Patient taking differently: as needed ) 90 tablet 1     levothyroxine (SYNTHROID/LEVOTHROID) 137 MCG tablet Take 1 tablet (137 mcg) by mouth daily 90 tablet 3     simvastatin (ZOCOR) 20 MG tablet Take 1 tablet (20 mg) by mouth At Bedtime 90 tablet 3     timolol maleate (TIMOPTIC) 0.5 % ophthalmic solution        XALATAN 0.005 % OP SOLN qd       fluticasone (FLONASE) 50 MCG/ACT nasal spray Spray 2 sprays into both nostrils daily 15.8 mL 1     Allergies   Allergen Reactions     No Known Drug Allergies      BP Readings from Last 3 Encounters:   02/05/20 122/68   01/29/20 122/62   01/06/20 129/79    Wt Readings from Last 3 Encounters:   06/22/20 93 kg (205 lb)   06/08/20 93 kg (205 lb)   02/05/20 93.4 kg (206 lb)         Reviewed and updated as needed this visit by Provider       Review of Systems   CONSTITUTIONAL: NEGATIVE for fever, chills, mild change in weight  ENT/MOUTH: NEGATIVE for ear, mouth and throat problems  RESP: NEGATIVE for significant cough or SOB  GI: NEGATIVE for nausea, abdominal pain, heartburn, or change in bowel habits  : NEGATIVE for frequency, dysuria, or hematuria  MUSCULOSKELETAL: NEGATIVE for significant arthralgias or myalgia  NEURO: NEGATIVE for weakness, dizziness or paresthesias  ENDOCRINE: NEGATIVE for temperature intolerance, skin/hair changes  HEME: NEGATIVE for bleeding problems  PSYCHIATRIC: NEGATIVE for changes in mood or affect      Objective    /68   Pulse 69   Temp 98.2  F (36.8  C) (Temporal)   Resp 16   Wt 94.6 kg (208 lb 8 oz)   SpO2 97%   BMI 29.92 kg/m    Body mass index is 29.92 kg/m .  Physical Exam   GENERAL: alert and no  distress  EYES: Eyes grossly normal to inspection, PERRL and conjunctivae and sclerae normal  HENT: ear canals and TM's normal, nose and mouth without ulcers or lesions  NECK: no adenopathy, no asymmetry, masses, or scars and thyroid normal to palpation  RESP: lungs clear to auscultation - no rales, rhonchi or wheezes  CV: regular rate and rhythm, normal S1 S2, no S3 or S4, no click or rub, noted trace to 1+ peripheral edema and peripheral pulses strong  NEURO: Normal strength and tone, mentation intact and speech normal  PSYCH: mentation appears normal, affect normal/bright        Assessment & Plan     1. Edema, unspecified type  Suspect dependent edema without any other subjective complaints.  Advised patient should be wearing support or DENIS hose.  Suggest low-sodium diet.  Suggest cutting amlodipine to 5 mg as this may be a component of swelling.  Suggest Lasix as needed 20 mg tablet only as needed and if using suggest recheck basic metabolic panel in 2 to 4 weeks with lab orders placed  - furosemide (LASIX) 20 MG tablet; Take 1 tablet (20 mg) by mouth daily As needed edema  Dispense: 30 tablet; Refill: 0  - Basic metabolic panel  (Ca, Cl, CO2, Creat, Gluc, K, Na, BUN); Future    2. Essential hypertension, benign  Stable at present time and continuing with current medical management with dosing change  - amLODIPine (NORVASC) 10 MG tablet; Take 0.5 tablets (5 mg) by mouth daily  Dispense: 45 tablet; Refill: 3       See Patient Instructions    Return in about 4 weeks (around 8/12/2020) for Lab Work appointment.    Colton Chris MD  [facility]

## 2020-08-10 DIAGNOSIS — R60.9 EDEMA, UNSPECIFIED TYPE: ICD-10-CM

## 2020-08-10 LAB
ANION GAP SERPL CALCULATED.3IONS-SCNC: 5 MMOL/L (ref 3–14)
BUN SERPL-MCNC: 12 MG/DL (ref 7–30)
CALCIUM SERPL-MCNC: 9 MG/DL (ref 8.5–10.1)
CHLORIDE SERPL-SCNC: 107 MMOL/L (ref 94–109)
CO2 SERPL-SCNC: 27 MMOL/L (ref 20–32)
CREAT SERPL-MCNC: 1.06 MG/DL (ref 0.66–1.25)
GFR SERPL CREATININE-BSD FRML MDRD: 64 ML/MIN/{1.73_M2}
GLUCOSE SERPL-MCNC: 103 MG/DL (ref 70–99)
POTASSIUM SERPL-SCNC: 3.7 MMOL/L (ref 3.4–5.3)
SODIUM SERPL-SCNC: 139 MMOL/L (ref 133–144)

## 2020-08-10 PROCEDURE — 36415 COLL VENOUS BLD VENIPUNCTURE: CPT | Performed by: INTERNAL MEDICINE

## 2020-08-10 PROCEDURE — 80048 BASIC METABOLIC PNL TOTAL CA: CPT | Performed by: INTERNAL MEDICINE

## 2020-08-13 DIAGNOSIS — R60.9 EDEMA, UNSPECIFIED TYPE: ICD-10-CM

## 2020-08-13 RX ORDER — FUROSEMIDE 20 MG
20 TABLET ORAL DAILY
Qty: 30 TABLET | Refills: 0 | Status: SHIPPED | OUTPATIENT
Start: 2020-08-13 | End: 2020-09-17

## 2020-08-14 NOTE — TELEPHONE ENCOUNTER
Meant to be as needed but appears to be taking this daily.  I agree follow-up was requested 4 weeks.  Call patient to have them follow-up with Dr. Chris.

## 2020-08-17 ENCOUNTER — OFFICE VISIT (OUTPATIENT)
Dept: INTERNAL MEDICINE | Facility: CLINIC | Age: 85
End: 2020-08-17
Payer: COMMERCIAL

## 2020-08-17 VITALS
TEMPERATURE: 97.4 F | WEIGHT: 204.7 LBS | OXYGEN SATURATION: 99 % | RESPIRATION RATE: 15 BRPM | HEART RATE: 64 BPM | HEIGHT: 70 IN | DIASTOLIC BLOOD PRESSURE: 72 MMHG | SYSTOLIC BLOOD PRESSURE: 134 MMHG | BODY MASS INDEX: 29.31 KG/M2

## 2020-08-17 DIAGNOSIS — I10 ESSENTIAL HYPERTENSION, BENIGN: ICD-10-CM

## 2020-08-17 LAB
ANION GAP SERPL CALCULATED.3IONS-SCNC: 3 MMOL/L (ref 3–14)
BUN SERPL-MCNC: 12 MG/DL (ref 7–30)
CALCIUM SERPL-MCNC: 9 MG/DL (ref 8.5–10.1)
CHLORIDE SERPL-SCNC: 107 MMOL/L (ref 94–109)
CO2 SERPL-SCNC: 29 MMOL/L (ref 20–32)
CREAT SERPL-MCNC: 0.97 MG/DL (ref 0.66–1.25)
GFR SERPL CREATININE-BSD FRML MDRD: 71 ML/MIN/{1.73_M2}
GLUCOSE SERPL-MCNC: 94 MG/DL (ref 70–99)
POTASSIUM SERPL-SCNC: 4 MMOL/L (ref 3.4–5.3)
SODIUM SERPL-SCNC: 139 MMOL/L (ref 133–144)
TSH SERPL DL<=0.005 MIU/L-ACNC: 3.93 MU/L (ref 0.4–4)

## 2020-08-17 PROCEDURE — 80048 BASIC METABOLIC PNL TOTAL CA: CPT | Performed by: INTERNAL MEDICINE

## 2020-08-17 PROCEDURE — 99214 OFFICE O/P EST MOD 30 MIN: CPT | Performed by: INTERNAL MEDICINE

## 2020-08-17 PROCEDURE — 84443 ASSAY THYROID STIM HORMONE: CPT | Performed by: INTERNAL MEDICINE

## 2020-08-17 PROCEDURE — 36415 COLL VENOUS BLD VENIPUNCTURE: CPT | Performed by: INTERNAL MEDICINE

## 2020-08-17 RX ORDER — AMLODIPINE BESYLATE 5 MG/1
2.5 TABLET ORAL DAILY
Qty: 45 TABLET | Refills: 0 | Status: SHIPPED | OUTPATIENT
Start: 2020-08-17 | End: 2020-10-13

## 2020-08-17 RX ORDER — AMLODIPINE BESYLATE 5 MG/1
5 TABLET ORAL DAILY
Status: CANCELLED | OUTPATIENT
Start: 2020-08-17

## 2020-08-17 ASSESSMENT — MIFFLIN-ST. JEOR: SCORE: 1624.76

## 2020-08-17 NOTE — PROGRESS NOTES
Subjective     Abundio Beckett is a 84 year old male who presents to clinic today for the following health issues:    HPI     Recheck edema from 7/15/20. Patient states no improvement with compression stockings and lasix     Hypertension Follow-up      Do you check your blood pressure regularly outside of the clinic? No     Are you following a low salt diet? No- no added salt     Are your blood pressures ever more than 140 on the top number (systolic) OR more   than 90 on the bottom number (diastolic), for example 140/90? No      How many servings of fruits and vegetables do you eat daily?  0-1    On average, how many sweetened beverages do you drink each day (Examples: soda, juice, sweet tea, etc.  Do NOT count diet or artificially sweetened beverages)?   0    How many days per week do you exercise enough to make your heart beat faster? 5    How many minutes a day do you exercise enough to make your heart beat faster? 30 - 60    How many days per week do you miss taking your medication? 0    Lopez has been taking the water pill on a consistent basis.  He has not been wearing his DENIS hose as recommended.  He has cut back from 10 mg of amlodipine to 5 mg.  His blood pressure has been stable.    Patient Active Problem List   Diagnosis     Hypertrophy (benign) of prostate     Allergic rhinitis     Advance Care Planning     Hyperlipidemia LDL goal <100     Hyperglycemia     Lacunar infarction (H)     Hypothyroidism, unspecified type     Essential hypertension, benign     History of prostate cancer     Past Surgical History:   Procedure Laterality Date     C REMV PROSTATE,PERINEAL,RADICAL  2000     HC SHOULDER ARTHROSCOPY, DX         Social History     Tobacco Use     Smoking status: Never Smoker     Smokeless tobacco: Never Used   Substance Use Topics     Alcohol use: Yes     Frequency: 2-4 times a month     Drinks per session: 1 or 2     Binge frequency: Never     No family history on file.      Current Outpatient  "Medications   Medication Sig Dispense Refill     amLODIPine (NORVASC) 5 MG tablet Take 0.5 tablets (2.5 mg) by mouth daily 45 tablet 0     aspirin 325 MG EC tablet Take 1 tablet by mouth daily. 100 tablet 3     CENTRUM SILVER OR TABS 1 TABLET DAILY       citalopram (CELEXA) 20 MG tablet TAKE 1 TABLET BY MOUTH  DAILY (Patient taking differently: as needed ) 90 tablet 1     fluticasone (FLONASE) 50 MCG/ACT nasal spray Spray 2 sprays into both nostrils daily 15.8 mL 1     furosemide (LASIX) 20 MG tablet Take 1 tablet (20 mg) by mouth daily As needed edema 30 tablet 0     levothyroxine (SYNTHROID/LEVOTHROID) 137 MCG tablet Take 1 tablet (137 mcg) by mouth daily 90 tablet 3     simvastatin (ZOCOR) 20 MG tablet Take 1 tablet (20 mg) by mouth At Bedtime 90 tablet 3     timolol maleate (TIMOPTIC) 0.5 % ophthalmic solution        XALATAN 0.005 % OP SOLN qd       Allergies   Allergen Reactions     No Known Drug Allergies      BP Readings from Last 3 Encounters:   08/17/20 134/72   07/15/20 130/68   02/05/20 122/68    Wt Readings from Last 3 Encounters:   08/17/20 92.9 kg (204 lb 11.2 oz)   07/15/20 94.6 kg (208 lb 8 oz)   06/22/20 93 kg (205 lb)            Reviewed and updated as needed this visit by Provider         Review of Systems   CONSTITUTIONAL: NEGATIVE for fever, chills, change in weight  ENT/MOUTH: NEGATIVE for ear, mouth and throat problems  RESP: NEGATIVE for significant cough or SOB  GI: NEGATIVE for nausea, abdominal pain, heartburn, or change in bowel habits  : NEGATIVE for frequency, dysuria, or hematuria  MUSCULOSKELETAL: NEGATIVE for significant arthralgias or myalgia  NEURO: NEGATIVE for weakness, dizziness or paresthesias  HEME: NEGATIVE for bleeding problems  PSYCHIATRIC: NEGATIVE for changes in mood or affect      Objective    /72   Pulse 64   Temp 97.4  F (36.3  C) (Temporal)   Resp 15   Ht 1.778 m (5' 10\")   Wt 92.9 kg (204 lb 11.2 oz)   SpO2 99%   BMI 29.37 kg/m    There is no height or " weight on file to calculate BMI.  Physical Exam   GENERAL: healthy, alert and no distress  EYES: Eyes grossly normal to inspection, PERRL and conjunctivae and sclerae normal  HENT: ear canals and TM's normal, nose and mouth without ulcers or lesions  NECK: no adenopathy, no asymmetry, masses, or scars and thyroid normal to palpation  RESP: lungs clear to auscultation - no rales, rhonchi or wheezes  CV: regular rate and rhythm, normal S1 S2, no S3 or S4, no click or rub, no peripheral edema and peripheral pulses strong  MS: There is very mild edema noted to the lower extremities bilaterally.  This is significantly better than when last checked.  NEURO: Normal strength and tone, mentation intact and speech normal  PSYCH: mentation appears normal, affect normal/bright            Assessment & Plan     1. Essential hypertension, benign  He did drop the amlodipine to 2.5 mg in hopes of helping edema.  Recheck basic metabolic panel.  Continue with Lasix as needed.  Suggest ongoing use of DENIS hose.  - amLODIPine (NORVASC) 5 MG tablet; Take 0.5 tablets (2.5 mg) by mouth daily  Dispense: 45 tablet; Refill: 0  - Basic metabolic panel  (Ca, Cl, CO2, Creat, Gluc, K, Na, BUN)  - TSH with free T4 reflex       See Patient Instructions    Return for Medicare Wellness Exam.    Colton Chris MD  [facility]

## 2020-09-17 ENCOUNTER — TELEPHONE (OUTPATIENT)
Dept: INTERNAL MEDICINE | Facility: CLINIC | Age: 85
End: 2020-09-17

## 2020-09-17 DIAGNOSIS — R60.9 EDEMA, UNSPECIFIED TYPE: ICD-10-CM

## 2020-09-17 RX ORDER — FUROSEMIDE 20 MG
20 TABLET ORAL DAILY
Qty: 90 TABLET | Refills: 2 | Status: SHIPPED | OUTPATIENT
Start: 2020-09-17 | End: 2020-11-04

## 2020-09-17 NOTE — TELEPHONE ENCOUNTER
He certainly could try stopping it to see how he does in response to the swelling that he complained of in the past in his legs.  If he feels he gets by without it he could use the Lasix on an as-needed basis when he feels his legs get a little bit more swollen.

## 2020-09-17 NOTE — TELEPHONE ENCOUNTER
Pt currently takes it everyday, skipping a day or 2 every week.  Still has mild swelling to bilat ankles.  Is using compression stockings.  Do you want him to continue this or start just taking it everyday, not skipping days?

## 2020-09-17 NOTE — TELEPHONE ENCOUNTER
Patient called to talk to nurse about his taking his diuretic he is out now wants to know if he should continue taking this please call patient regarding this phone [phone]

## 2020-10-11 DIAGNOSIS — I10 ESSENTIAL HYPERTENSION, BENIGN: ICD-10-CM

## 2020-10-13 RX ORDER — AMLODIPINE BESYLATE 5 MG/1
TABLET ORAL
Qty: 45 TABLET | Refills: 3 | Status: SHIPPED | OUTPATIENT
Start: 2020-10-13 | End: 2020-12-08

## 2020-10-13 NOTE — TELEPHONE ENCOUNTER
Prescription approved per [facility] [place].    Constance FONTANAN, RN, PHN

## 2020-10-18 DIAGNOSIS — E78.5 HYPERLIPIDEMIA LDL GOAL <100: ICD-10-CM

## 2020-10-18 DIAGNOSIS — I63.81 LACUNAR INFARCTION (H): ICD-10-CM

## 2020-10-18 DIAGNOSIS — E03.9 HYPOTHYROIDISM, UNSPECIFIED TYPE: ICD-10-CM

## 2020-10-18 NOTE — LETTER
[facility]  [address] [id number] [phone]            Abundio Beckett  [address]   [facility] [address]        October 20, 2020    Dear Lopez,    While refilling your prescription today, we noticed that you are due to have labs drawn.  We will refill your prescription for 30 days, but a follow-up appointment must be made before any additional refills can be approved.     Taking care of your health is important to us and we look forward to seeing you in the near future.  Please call us at [phone] or [phone] (or use [email or web address]) to schedule an appointment.     Please disregard this notice if you have already made an appointment.    Sincerely,        [facility]

## 2020-10-19 RX ORDER — LEVOTHYROXINE SODIUM 137 UG/1
TABLET ORAL
Qty: 90 TABLET | Refills: 2 | Status: SHIPPED | OUTPATIENT
Start: 2020-10-19 | End: 2021-07-19

## 2020-10-19 RX ORDER — SIMVASTATIN 20 MG
TABLET ORAL
Qty: 90 TABLET | Refills: 0 | Status: SHIPPED | OUTPATIENT
Start: 2020-10-19 | End: 2021-01-10

## 2020-10-19 NOTE — TELEPHONE ENCOUNTER
Simvastatin    Routing refill request to provider for review/approval because:  Labs not current:  Lipids    Constance FONTANAN, RN, PHN

## 2020-10-19 NOTE — TELEPHONE ENCOUNTER
Levothyroxine    Prescription approved per [facility] Refill Protocol.    Constance FONTANAN, RN, PHN

## 2020-10-19 NOTE — TELEPHONE ENCOUNTER
I have filled the patient needs to make a lab appointment for which I placed labs.  Please inform to schedule

## 2020-10-21 ENCOUNTER — TELEPHONE (OUTPATIENT)
Dept: INTERNAL MEDICINE | Facility: CLINIC | Age: 85
End: 2020-10-21

## 2020-10-21 NOTE — TELEPHONE ENCOUNTER
Patient Quality Outreach      Summary:    Patient is due/failing the following:   Annual wellness, date due: 10/7/20 and Immunizations    Type of outreach:    Sent [employer] message.    Questions for provider review:    None                                                                                   **Start Working phrase here:**    Patient Active Problem List   Diagnosis     Hypertrophy (benign) of prostate     Allergic rhinitis     Advance Care Planning     Hyperlipidemia LDL goal <100     Hyperglycemia     Lacunar infarction (H)     Hypothyroidism, unspecified type     Essential hypertension, benign     History of prostate cancer          Patient has the following on his problem list/HM:   Hypertension   Last three blood pressure readings:  BP Readings from Last 3 Encounters:   08/17/20 134/72   07/15/20 130/68   02/05/20 122/68     Blood pressure: Passed    HTN Guidelines:  ? 139/89                                                 Nataly Uribe CMA       Chart routed to self.

## 2020-10-28 DIAGNOSIS — E78.5 HYPERLIPIDEMIA LDL GOAL <100: ICD-10-CM

## 2020-10-28 PROCEDURE — 80053 COMPREHEN METABOLIC PANEL: CPT | Performed by: INTERNAL MEDICINE

## 2020-10-28 PROCEDURE — 80061 LIPID PANEL: CPT | Performed by: INTERNAL MEDICINE

## 2020-10-29 ENCOUNTER — MYC MEDICAL ADVICE (OUTPATIENT)
Dept: INTERNAL MEDICINE | Facility: CLINIC | Age: 85
End: 2020-10-29

## 2020-10-29 LAB
ALBUMIN SERPL-MCNC: 3.5 G/DL (ref 3.4–5)
ALP SERPL-CCNC: 60 U/L (ref 40–150)
ALT SERPL W P-5'-P-CCNC: 13 U/L (ref 0–70)
ANION GAP SERPL CALCULATED.3IONS-SCNC: 3 MMOL/L (ref 3–14)
AST SERPL W P-5'-P-CCNC: 11 U/L (ref 0–45)
BILIRUB SERPL-MCNC: 0.8 MG/DL (ref 0.2–1.3)
BUN SERPL-MCNC: 14 MG/DL (ref 7–30)
CALCIUM SERPL-MCNC: 8.5 MG/DL (ref 8.5–10.1)
CHLORIDE SERPL-SCNC: 108 MMOL/L (ref 94–109)
CHOLEST SERPL-MCNC: 137 MG/DL
CO2 SERPL-SCNC: 30 MMOL/L (ref 20–32)
CREAT SERPL-MCNC: 1.01 MG/DL (ref 0.66–1.25)
GFR SERPL CREATININE-BSD FRML MDRD: 68 ML/MIN/{1.73_M2}
GLUCOSE SERPL-MCNC: 92 MG/DL (ref 70–99)
HDLC SERPL-MCNC: 52 MG/DL
LDLC SERPL CALC-MCNC: 63 MG/DL
NONHDLC SERPL-MCNC: 85 MG/DL
POTASSIUM SERPL-SCNC: 4.2 MMOL/L (ref 3.4–5.3)
PROT SERPL-MCNC: 6.8 G/DL (ref 6.8–8.8)
SODIUM SERPL-SCNC: 141 MMOL/L (ref 133–144)
TRIGL SERPL-MCNC: 110 MG/DL

## 2020-10-30 ENCOUNTER — NURSE TRIAGE (OUTPATIENT)
Dept: INTERNAL MEDICINE | Facility: CLINIC | Age: 85
End: 2020-10-30

## 2020-10-30 NOTE — TELEPHONE ENCOUNTER
Reason for call:  Patient reporting a symptom  Symptom or request: mini stroke  Duration (how long have symptoms been present): 1 day  Have you been treated for this before? No  Call taken on 10/30/2020 at 3:49 PM by MARTI CLAYTON

## 2020-10-30 NOTE — TELEPHONE ENCOUNTER
Woke up, got out of bed, was very dizzy, almost fell but caught himself, and eyes moving sideways rapidly (from L to R lasted about 1 min), couldn't focus eyesight.   Hx of TIA's.  As day has gone on, symptoms had gotten back to normal.   No headaches, no weakness, or numbness, no SOB, no chest pain.     Pt has appt with Dr. Chris scheduled for 11/4/2020 1:20 PM.

## 2020-11-04 ENCOUNTER — OFFICE VISIT (OUTPATIENT)
Dept: INTERNAL MEDICINE | Facility: CLINIC | Age: 85
End: 2020-11-04
Payer: COMMERCIAL

## 2020-11-04 VITALS
WEIGHT: 207.9 LBS | BODY MASS INDEX: 29.76 KG/M2 | RESPIRATION RATE: 15 BRPM | OXYGEN SATURATION: 96 % | HEIGHT: 70 IN | SYSTOLIC BLOOD PRESSURE: 120 MMHG | TEMPERATURE: 96.6 F | HEART RATE: 78 BPM | DIASTOLIC BLOOD PRESSURE: 72 MMHG

## 2020-11-04 DIAGNOSIS — Z12.11 COLON CANCER SCREENING: ICD-10-CM

## 2020-11-04 DIAGNOSIS — R60.9 EDEMA, UNSPECIFIED TYPE: ICD-10-CM

## 2020-11-04 DIAGNOSIS — E03.9 HYPOTHYROIDISM, UNSPECIFIED TYPE: ICD-10-CM

## 2020-11-04 DIAGNOSIS — Z00.00 ENCOUNTER FOR MEDICARE ANNUAL WELLNESS EXAM: Primary | ICD-10-CM

## 2020-11-04 DIAGNOSIS — E78.5 HYPERLIPIDEMIA LDL GOAL <100: ICD-10-CM

## 2020-11-04 DIAGNOSIS — R47.89 SPELL OF CHANGE IN SPEECH: ICD-10-CM

## 2020-11-04 DIAGNOSIS — I10 ESSENTIAL HYPERTENSION, BENIGN: ICD-10-CM

## 2020-11-04 PROCEDURE — 99397 PER PM REEVAL EST PAT 65+ YR: CPT | Performed by: INTERNAL MEDICINE

## 2020-11-04 PROCEDURE — 99213 OFFICE O/P EST LOW 20 MIN: CPT | Mod: 25 | Performed by: INTERNAL MEDICINE

## 2020-11-04 RX ORDER — FUROSEMIDE 20 MG
20 TABLET ORAL DAILY
Qty: 90 TABLET | Refills: 2 | Status: SHIPPED | OUTPATIENT
Start: 2020-11-04 | End: 2021-10-11

## 2020-11-04 ASSESSMENT — MIFFLIN-ST. JEOR: SCORE: 1639.28

## 2020-11-04 NOTE — PROGRESS NOTES
"  SUBJECTIVE:   Abundio Beckett is a 84 year old male who presents for Preventive Visit.    Patient has been advised of split billing requirements and indicates understanding: Yes  Are you in the first 12 months of your Medicare Part B coverage?  No    Physical Health:    In general, how would you rate your overall physical health? good    Outside of work, how many days during the week do you exercise? 4-5 days/week    Outside of work, approximately how many minutes a day do you exercise?30-45 minutes    If you drink alcohol do you typically have >3 drinks per day or >7 drinks per week? No    Do you usually eat at least 4 servings of fruit and vegetables a day, include whole grains & fiber and avoid regularly eating high fat or \"junk\" foods? NO    Do you have any problems taking medications regularly?  No    Do you have any side effects from medications? none    Needs assistance for the following daily activities: no assistance needed    Which of the following safety concerns are present in your home?  none identified     Hearing impairment: No    In the past 6 months, have you been bothered by leaking of urine? yes    Mental Health:    In general, how would you rate your overall mental or emotional health? good  PHQ-2 Score:      Do you feel safe in your environment? Yes    Have you ever done Advance Care Planning? (For example, a Health Directive, POLST, or a discussion with a medical provider or your loved ones about your wishes): Yes, advance care planning is on file.    Additional concerns to address?  YES    Fall risk:  Low risk    Cognitive Screenin) Repeat 3 items (Leader, Season, Table)    2) Clock draw: NORMAL  3) 3 item recall: Recalls 1 object   Results: NORMAL clock, 1-2 items recalled: COGNITIVE IMPAIRMENT LESS LIKELY    Mini-CogTM Copyright BRANDON Shelby. Licensed by the author for use in [facility]; reprinted with permission (trudi@.[email or web address]). All rights reserved.      Do you have sleep " apnea, excessive snoring or daytime drowsiness?: no    Reviewed and updated as needed this visit by clinical staff             Other concerns:  1. BP readings ranging 181-125/86-69  2. Patient thinks he had a TIA on 10/30, felt very dizzy when he woke up things appeared to be floating. Holding furosemide, and increased amlodipine since then  3. Bilateral leg swelling     Reviewed and updated as needed this visit by Provider                Social History     Tobacco Use     Smoking status: Never Smoker     Smokeless tobacco: Never Used   Substance Use Topics     Alcohol use: Yes     Frequency: 2-4 times a month     Drinks per session: 1 or 2     Binge frequency: Never                           Current providers sharing in care for this patient include:   Patient Care Team:  Colton Chris MD as PCP - General  Colton Chris MD as Assigned PCP    The following health maintenance items are reviewed in Epic and correct as of today:  Health Maintenance   Topic Date Due     DTAP/TDAP/TD IMMUNIZATION (3 - Td) 08/26/2018     INFLUENZA VACCINE (1) 09/01/2020     FALL RISK ASSESSMENT  10/07/2020     MEDICARE ANNUAL WELLNESS VISIT  10/07/2020     PHQ-9  12/08/2020     TSH W/FREE T4 REFLEX  08/17/2021     ADVANCE CARE PLANNING  11/28/2021     DEPRESSION ACTION PLAN  Completed     Pneumococcal Vaccine: 65+ Years  Completed     ZOSTER IMMUNIZATION  Completed     Pneumococcal Vaccine: Pediatrics (0 to 5 Years) and At-Risk Patients (6 to 64 Years)  Aged Out     IPV IMMUNIZATION  Aged Out     MENINGITIS IMMUNIZATION  Aged Out     HEPATITIS B IMMUNIZATION  Aged Out       Immunization History   Administered Date(s) Administered     HEPA 06/05/2000, 06/11/2002     HepB-Adult 06/13/2002, 07/10/2002     Influenza (H1N1) 12/21/2009     Influenza (High Dose) 3 valent vaccine 10/01/2015, 09/07/2016, 09/14/2017, 09/18/2018, 10/15/2020     Influenza (IIV3) PF 11/22/2000, 09/25/2010, 09/03/2011     Influenza Quad, Recombinant, p-free  "(RIV4) 10/01/2019     LYMERIX 05/12/1999, 06/24/1999, 06/02/2000     Pneumo Conj 13-V (2010&after) 06/02/2016     Pneumococcal 23 valent 10/11/1999, 05/22/2006     Poliovirus, inactivated (IPV) 06/05/2000     TD (ADULT, 7+) 01/13/1997, 08/26/2008     Typhoid IM 06/13/2002     Yellow Fever 06/13/2002     Zoster vaccine recombinant adjuvanted (SHINGRIX) 09/30/2019, 12/12/2019     Zoster vaccine, live 07/16/2009       ROS:  CONSTITUTIONAL: NEGATIVE for fever, chills, change in weight  INTEGUMENTARY/SKIN: NEGATIVE for worrisome rashes, moles or lesions  EYES: NEGATIVE for vision changes or irritation  ENT/MOUTH: NEGATIVE for ear, mouth and throat problems  RESP: NEGATIVE for significant cough or SOB  CV: NEGATIVE for chest pain, palpitations with mild peripheral edema  GI: NEGATIVE for nausea, abdominal pain, heartburn, or change in bowel habits  : NEGATIVE for frequency, dysuria, or hematuria  MUSCULOSKELETAL: NEGATIVE for significant arthralgias or myalgia  NEURO: NEGATIVE for weakness, dizziness or paresthesias at current  HEME: NEGATIVE for bleeding problems  PSYCHIATRIC: NEGATIVE for changes in mood or affect    OBJECTIVE:   /72   Pulse 78   Temp 96.6  F (35.9  C) (Temporal)   Resp 15   Ht 1.778 m (5' 10\")   Wt 94.3 kg (207 lb 14.4 oz)   SpO2 96%   BMI 29.83 kg/m   Estimated body mass index is 29.37 kg/m  as calculated from the following:    Height as of 8/17/20: 1.778 m (5' 10\").    Weight as of 8/17/20: 92.9 kg (204 lb 11.2 oz).  EXAM:   GENERAL: healthy, alert and no distress  EYES: Eyes grossly normal to inspection, PERRL and conjunctivae and sclerae normal, mild lid lag left greater than right likely due to redundant skin tissue  HENT: ear canals and TM's normal, nose and mouth without ulcers or lesions  NECK: no adenopathy, no asymmetry, masses, or scars and thyroid normal to palpation  RESP: lungs clear to auscultation - no rales, rhonchi or wheezes  CV: regular rate and rhythm, normal S1 S2, " no S3 or S4, no click or rub, noted trace peripheral edema and peripheral pulses strong  MS: no gross musculoskeletal defects noted, mild edema  NEURO: Normal strength and tone, mentation intact and speech normal  PSYCH: mentation appears normal, affect normal/bright      ASSESSMENT / PLAN:   1. Encounter for Medicare annual wellness exam  Advised updated tetanus booster at the patient's pharmacy.  - Hemoglobin; Future    2. Essential hypertension, benign  Reviewed patient's blood pressure results.  Suggest continuing with amlodipine 2.5 mg daily as did not tolerate increased dose due to edema.  Add Lasix back daily once tolerating every other day dose.  Suggest recheck basic metabolic panel in 4 weeks  - furosemide (LASIX) 20 MG tablet; Take 1 tablet (20 mg) by mouth daily As needed edema  Dispense: 90 tablet; Refill: 2  - Basic metabolic panel  (Ca, Cl, CO2, Creat, Gluc, K, Na, BUN); Future    3. Hyperlipidemia LDL goal <100  LDL Cholesterol Calculated   Date Value Ref Range Status   10/28/2020 63 <100 mg/dL Final     Comment:     Desirable:       <100 mg/dl     Stable at present time and at goal.    4. Hypothyroidism, unspecified type  TSH   Date Value Ref Range Status   08/17/2020 3.93 0.40 - 4.00 mU/L Final     Normal TSH as defined    5. Spell of change in speech  Discussed with patient symptoms, discussed etiology of TIA.  Discussed work-up recommended including vascular, cardiovascular and imaging studies.  Patient is not interested in pursuing evaluation at present time.  We discussed this in depth.  Continue with aspirin daily.    6. Colon cancer screening  Advise routine FIT testing  - Fecal colorectal cancer screen (FIT); Future    7. Edema, unspecified type  Suggest increasing Lasix accordingly to every other day dose and then titrate as tolerated with recheck basic metabolic panel, suggest low-sodium diet and DENIS hose to lower extremities  - furosemide (LASIX) 20 MG tablet; Take 1 tablet (20 mg) by  "mouth daily As needed edema  Dispense: 90 tablet; Refill: 2    Patient has been advised of split billing requirements and indicates understanding: Yes    COUNSELING:  Reviewed preventive health counseling, as reflected in patient instructions       Regular exercise       Healthy diet/nutrition    Estimated body mass index is 29.37 kg/m  as calculated from the following:    Height as of 8/17/20: 1.778 m (5' 10\").    Weight as of 8/17/20: 92.9 kg (204 lb 11.2 oz).    Weight management plan: Discussed healthy diet and exercise guidelines    He reports that he has never smoked. He has never used smokeless tobacco.    Appropriate preventive services were discussed with this patient, including applicable screening as appropriate for cardiovascular disease, diabetes, osteopenia/osteoporosis, and glaucoma.  As appropriate for age/gender, discussed screening for colorectal cancer, prostate cancer, breast cancer, and cervical cancer. Checklist reviewing preventive services available has been given to the patient.    Reviewed patients plan of care and provided an AVS. The Basic Care Plan (routine screening as documented in Health Maintenance) for Abundio meets the Care Plan requirement. This Care Plan has been established and reviewed with the Patient.    Counseling Resources:  ATP IV Guidelines  Pooled Cohorts Equation Calculator  Breast Cancer Risk Calculator  BRCA-Related Cancer Risk Assessment: FHS-7 Tool  FRAX Risk Assessment  ICSI Preventive Guidelines  Dietary Guidelines for Americans, 2010  USDA's MyPlate  ASA Prophylaxis  Lung CA Screening    Colton Chris MD  [facility]  "

## 2020-12-03 ENCOUNTER — MYC MEDICAL ADVICE (OUTPATIENT)
Dept: INTERNAL MEDICINE | Facility: CLINIC | Age: 85
End: 2020-12-03

## 2020-12-03 NOTE — TELEPHONE ENCOUNTER
Discussed with pt the TIA information MD provided when to go to ER and that he should follow up .Mckayla Long RN

## 2020-12-03 NOTE — TELEPHONE ENCOUNTER
Please see NavigatorMDhart message. Regarding patient concerns of TIA.    Constance FONTANAN, RN, PHN

## 2020-12-04 DIAGNOSIS — Z00.00 ENCOUNTER FOR MEDICARE ANNUAL WELLNESS EXAM: ICD-10-CM

## 2020-12-04 DIAGNOSIS — Z12.11 COLON CANCER SCREENING: ICD-10-CM

## 2020-12-04 DIAGNOSIS — I10 ESSENTIAL HYPERTENSION, BENIGN: ICD-10-CM

## 2020-12-04 LAB
ANION GAP SERPL CALCULATED.3IONS-SCNC: 3 MMOL/L (ref 3–14)
BUN SERPL-MCNC: 14 MG/DL (ref 7–30)
CALCIUM SERPL-MCNC: 9.2 MG/DL (ref 8.5–10.1)
CHLORIDE SERPL-SCNC: 106 MMOL/L (ref 94–109)
CO2 SERPL-SCNC: 31 MMOL/L (ref 20–32)
CREAT SERPL-MCNC: 1.05 MG/DL (ref 0.66–1.25)
GFR SERPL CREATININE-BSD FRML MDRD: 64 ML/MIN/{1.73_M2}
GLUCOSE SERPL-MCNC: 93 MG/DL (ref 70–99)
HGB BLD-MCNC: 14.4 G/DL (ref 13.3–17.7)
POTASSIUM SERPL-SCNC: 4 MMOL/L (ref 3.4–5.3)
SODIUM SERPL-SCNC: 140 MMOL/L (ref 133–144)

## 2020-12-04 PROCEDURE — 82274 ASSAY TEST FOR BLOOD FECAL: CPT | Performed by: INTERNAL MEDICINE

## 2020-12-04 PROCEDURE — 36415 COLL VENOUS BLD VENIPUNCTURE: CPT | Performed by: INTERNAL MEDICINE

## 2020-12-04 PROCEDURE — 85018 HEMOGLOBIN: CPT | Performed by: INTERNAL MEDICINE

## 2020-12-04 PROCEDURE — 80048 BASIC METABOLIC PNL TOTAL CA: CPT | Performed by: INTERNAL MEDICINE

## 2020-12-08 ENCOUNTER — OFFICE VISIT (OUTPATIENT)
Dept: INTERNAL MEDICINE | Facility: CLINIC | Age: 85
End: 2020-12-08
Payer: COMMERCIAL

## 2020-12-08 VITALS
DIASTOLIC BLOOD PRESSURE: 80 MMHG | WEIGHT: 207.1 LBS | OXYGEN SATURATION: 99 % | TEMPERATURE: 97.7 F | SYSTOLIC BLOOD PRESSURE: 130 MMHG | HEART RATE: 62 BPM | BODY MASS INDEX: 29.72 KG/M2 | RESPIRATION RATE: 15 BRPM

## 2020-12-08 DIAGNOSIS — G45.9 TIA (TRANSIENT ISCHEMIC ATTACK): Primary | ICD-10-CM

## 2020-12-08 DIAGNOSIS — I10 ESSENTIAL HYPERTENSION, BENIGN: ICD-10-CM

## 2020-12-08 PROCEDURE — 99213 OFFICE O/P EST LOW 20 MIN: CPT | Performed by: INTERNAL MEDICINE

## 2020-12-08 RX ORDER — AMLODIPINE BESYLATE 5 MG/1
5 TABLET ORAL DAILY
Qty: 90 TABLET | Refills: 1
Start: 2020-12-08 | End: 2021-03-03

## 2020-12-09 LAB — HEMOCCULT STL QL IA: NEGATIVE

## 2021-01-09 DIAGNOSIS — I63.81 LACUNAR INFARCTION (H): ICD-10-CM

## 2021-01-09 DIAGNOSIS — E78.5 HYPERLIPIDEMIA LDL GOAL <100: ICD-10-CM

## 2021-01-10 RX ORDER — SIMVASTATIN 20 MG
TABLET ORAL
Qty: 90 TABLET | Refills: 2 | Status: SHIPPED | OUTPATIENT
Start: 2021-01-10 | End: 2022-01-11

## 2021-02-21 ENCOUNTER — MYC MEDICAL ADVICE (OUTPATIENT)
Dept: INTERNAL MEDICINE | Facility: CLINIC | Age: 86
End: 2021-02-21

## 2021-03-03 DIAGNOSIS — I10 ESSENTIAL HYPERTENSION, BENIGN: ICD-10-CM

## 2021-03-03 RX ORDER — AMLODIPINE BESYLATE 5 MG/1
5 TABLET ORAL DAILY
Qty: 90 TABLET | Refills: 2 | Status: SHIPPED | OUTPATIENT
Start: 2021-03-03 | End: 2021-10-11

## 2021-09-15 NOTE — PROGRESS NOTES
"CHIEF COMPLAINT:  Pain of the Right Hip and Pain of the Right Knee       HISTORY OF PRESENT ILLNESS  Mr. Beckett is a pleasant 85 year old year old male who presents to clinic today with right hip and right knee pain.  Abundio explains that he walks 1.5 miles per day, but has noticed increased hip pain with walking.     Right Hip Pain:  Onset: gradual, worsening  Location: right hip, patient states \"deep in joint\"   Quality:  aching and dull  Duration: 3 months   Severity: 2/10 at worst  Timing: worse with walking and sleeping on his right side  Modifying factors:  resting and non-use makes it better, movement and use makes it worse  Associated signs & symptoms: pain  Previous similar pain: No  Treatments to date: massage, CBD oil, exercise group for seniors - stretching, balancing    Right Knee:  Onset: gradual, worsening  Location: right knee, not a lot of pain, patient reports instability   Quality:  aching and dull  Duration: 3 months   Severity: 1/10 at worst  Timing:intermittent episodes with activity   Modifying factors:  resting and non-use makes it better, movement and use makes it worse  Associated signs & symptoms: feeling of instability, weakness in knee   Previous similar pain: No  Treatments to date: exercise group for seniors - stretching, balancing    Additional history: as documented    Review of Systems:    Have you recently had a a fever, chills, weight loss? No    Do you have any vision problems? No    Do you have any chest pain or edema? No    Do you have any shortness of breath or wheezing?  No    Do you have stomach problems? Takes stool softener     Do you have any numbness or focal weakness? No    Do you have diabetes? No    Do you have problems with bleeding or clotting? Yes - bleeds easily    Do you have an rashes or other skin lesions? No    MEDICAL HISTORY  Patient Active Problem List   Diagnosis     Hypertrophy (benign) of prostate     Allergic rhinitis     Advance Care Planning     " "Hyperlipidemia LDL goal <100     Hyperglycemia     Lacunar infarction (H)     Hypothyroidism, unspecified type     Essential hypertension, benign     History of prostate cancer       Current Outpatient Medications   Medication Sig Dispense Refill     amLODIPine (NORVASC) 5 MG tablet Take 1 tablet (5 mg) by mouth daily 90 tablet 2     aspirin 325 MG EC tablet Take 1 tablet by mouth daily. 100 tablet 3     CENTRUM SILVER OR TABS 1 TABLET DAILY       citalopram (CELEXA) 20 MG tablet TAKE 1 TABLET BY MOUTH  DAILY (Patient taking differently: as needed ) 90 tablet 1     furosemide (LASIX) 20 MG tablet Take 1 tablet (20 mg) by mouth daily As needed edema (Patient taking differently: Take 20 mg by mouth every other day As needed edema) 90 tablet 2     levothyroxine (SYNTHROID/LEVOTHROID) 137 MCG tablet TAKE 1 TABLET BY MOUTH  DAILY 90 tablet 0     simvastatin (ZOCOR) 20 MG tablet TAKE 1 TABLET BY MOUTH AT  BEDTIME 90 tablet 2     timolol maleate (TIMOPTIC) 0.5 % ophthalmic solution        XALATAN 0.005 % OP SOLN qd       fluticasone (FLONASE) 50 MCG/ACT nasal spray Spray 2 sprays into both nostrils daily (Patient not taking: Reported on 9/23/2021) 15.8 mL 1       Allergies   Allergen Reactions     No Known Drug Allergies        No family history on file.    Additional medical/Social/Surgical histories reviewed in [facility] and updated as appropriate.       PHYSICAL EXAM  /62   Ht 1.778 m (5' 10\")   Wt 93.9 kg (207 lb)   BMI 29.70 kg/m      General  - normal appearance, in no obvious distress  Musculoskeletal - hip right  - stance: without limp, no obvious leg length discrepancy, shortened stride length.  - inspection: no swelling or effusion,  normal bone and joint alignment, no obvious deformity  - palpation: tender over the greater trochanter  - ROM: normal and painless flexion, extension, IR, ER, adduction and abduction  - strength: 5/5 in all planes  - special tests:  (-) AHSAN/FADIR  - normal respiratory pattern, " non-labored  Musculoskeletal - knee right  - inspection: no swelling or effusion, normal muscle tone, normal bone and joint alignment, no obvious deformity  - palpation: no joint line tenderness, patellar tendon non-tender, tender medial patellar facet  - ROM: 135 degrees flexion, -5 degrees extension, not painful, crepitus with weight-bearing flexion  - strength: 5/5 in flexion, 4/5 in extension  - neuro: no sensory or motor deficit  - special tests:  (-) Lachman  (-) anterior drawer  (-) Becky  (-) Thessaly  (-) varus at 0 and 30 degrees flexion  (-) valgus at 0 and 30 degrees flexion  (+) Nando s compression test  (+) patellar grind  (-) patellar apprehension  Neuro  - no sensory or motor deficit, grossly normal coordination, normal muscle tone  Skin  - no ecchymosis, erythema, warmth, or induration, no obvious rash  Psych  - interactive, appropriate, normal mood and affect      IMAGING : XR HIP RIGHT 3V and KNEE RIGHT 3V. Final results and radiologist's interpretation, available in the [facility] health record. Images were reviewed with the patient/family members in the office today. My personal interpretation of the performed imaging is moderate osteoarthritic change with osteophyte formation at the acetabulum.     Right knee with moderate to femoral compartment narrowing, mainly at the medial aspect of trochlea.  Patellar subluxation medially     ASSESSMENT & PLAN  Mr. Beckett is a 85 year old year old male who presents to clinic today with mild lateral hip pain after walking as well as sensation of knee instability at times without pain.    Based on x-ray findings, I do think it is patellofemoral arthritis is limited soft to weaken quadriceps muscle.  I do think this is providing a sensation of instability and feeling of giving way.    In regard to his right hip he does have other osteoarthritic component however his pain is localizing towards the trochanteric region.  Discussed trochanteric bursitis/gluteal  tendinopathy is likely culprit.    Diagnosis:  1.  Gluteus medius tendinopathy right hip.  2.  Primary arthritis of right hip.  3.  Patellofemoral osteoarthritis right knee.  4.  Weakness of right lower extremity    Treatment options discussed and at this time would like him to start a home exercise program for gluteus medius, pelvi femoral strengthening as well as quadriceps focused knee exercises.  Did discuss consideration for diagnostic/therapeutic corticosteroid injection versus formal physical therapy.  He wishes to hold off on this aspect at this time.  He notes that he is quite functional and wants to be more proactive in preventing this from becoming significant pain that bothers him. May continue CBD oil  If  Helping. Discussed lack of literature /studies to cite scientific efficacy.    Follow-up in 6 to 8 weeks if no improvement.    It was a pleasure seeing Abundio today.    48 minutes on date of the encounter doing chart review, history and examination, independent imaging review, documentation, and additional activities noted above.      Amrit Tilley DO, CAQSM  Primary Care Sports Medicine

## 2021-09-23 ENCOUNTER — ANCILLARY PROCEDURE (OUTPATIENT)
Dept: GENERAL RADIOLOGY | Facility: CLINIC | Age: 86
End: 2021-09-23
Attending: FAMILY MEDICINE
Payer: COMMERCIAL

## 2021-09-23 ENCOUNTER — OFFICE VISIT (OUTPATIENT)
Dept: ORTHOPEDICS | Facility: CLINIC | Age: 86
End: 2021-09-23
Payer: COMMERCIAL

## 2021-09-23 VITALS
DIASTOLIC BLOOD PRESSURE: 62 MMHG | BODY MASS INDEX: 29.63 KG/M2 | WEIGHT: 207 LBS | HEIGHT: 70 IN | SYSTOLIC BLOOD PRESSURE: 138 MMHG

## 2021-09-23 DIAGNOSIS — M25.561 RIGHT KNEE PAIN: ICD-10-CM

## 2021-09-23 DIAGNOSIS — M25.561 ACUTE PAIN OF RIGHT KNEE: Primary | ICD-10-CM

## 2021-09-23 DIAGNOSIS — M25.551 RIGHT HIP PAIN: ICD-10-CM

## 2021-09-23 DIAGNOSIS — M16.11 PRIMARY OSTEOARTHRITIS OF RIGHT HIP: ICD-10-CM

## 2021-09-23 DIAGNOSIS — M17.11 PRIMARY OSTEOARTHRITIS OF RIGHT KNEE: ICD-10-CM

## 2021-09-23 DIAGNOSIS — M70.61 TROCHANTERIC BURSITIS, RIGHT HIP: ICD-10-CM

## 2021-09-23 PROCEDURE — 73562 X-RAY EXAM OF KNEE 3: CPT | Performed by: RADIOLOGY

## 2021-09-23 PROCEDURE — 73502 X-RAY EXAM HIP UNI 2-3 VIEWS: CPT | Mod: RT | Performed by: INTERNAL MEDICINE

## 2021-09-23 PROCEDURE — 99204 OFFICE O/P NEW MOD 45 MIN: CPT | Performed by: FAMILY MEDICINE

## 2021-09-23 ASSESSMENT — MIFFLIN-ST. JEOR: SCORE: 1630.2

## 2021-09-23 NOTE — LETTER
"    9/23/2021         RE: Abundio Beckett  [address]  [facility] [address]        Dear Colleague,    Thank you for referring your patient, Abundio Beckett, to the [facility] [place]. Please see a copy of my visit note below.    CHIEF COMPLAINT:  Pain of the Right Hip and Pain of the Right Knee       HISTORY OF PRESENT ILLNESS  Mr. Beckett is a pleasant 85 year old year old male who presents to clinic today with right hip and right knee pain.  Abundio explains that he walks 1.5 miles per day, but has noticed increased hip pain with walking.     Right Hip Pain:  Onset: gradual, worsening  Location: right hip, patient states \"deep in joint\"   Quality:  aching and dull  Duration: 3 months   Severity: 2/10 at worst  Timing: worse with walking and sleeping on his right side  Modifying factors:  resting and non-use makes it better, movement and use makes it worse  Associated signs & symptoms: pain  Previous similar pain: No  Treatments to date: massage, CBD oil, exercise group for seniors - stretching, balancing    Right Knee:  Onset: gradual, worsening  Location: right knee, not a lot of pain, patient reports instability   Quality:  aching and dull  Duration: 3 months   Severity: 1/10 at worst  Timing:intermittent episodes with activity   Modifying factors:  resting and non-use makes it better, movement and use makes it worse  Associated signs & symptoms: feeling of instability, weakness in knee   Previous similar pain: No  Treatments to date: exercise group for seniors - stretching, balancing    Additional history: as documented    Review of Systems:    Have you recently had a a fever, chills, weight loss? No    Do you have any vision problems? No    Do you have any chest pain or edema? No    Do you have any shortness of breath or wheezing?  No    Do you have stomach problems? Takes stool softener     Do you have any numbness or focal weakness? No    Do you have diabetes? " "No    Do you have problems with bleeding or clotting? Yes - bleeds easily    Do you have an rashes or other skin lesions? No    MEDICAL HISTORY  Patient Active Problem List   Diagnosis     Hypertrophy (benign) of prostate     Allergic rhinitis     Advance Care Planning     Hyperlipidemia LDL goal <100     Hyperglycemia     Lacunar infarction (H)     Hypothyroidism, unspecified type     Essential hypertension, benign     History of prostate cancer       Current Outpatient Medications   Medication Sig Dispense Refill     amLODIPine (NORVASC) 5 MG tablet Take 1 tablet (5 mg) by mouth daily 90 tablet 2     aspirin 325 MG EC tablet Take 1 tablet by mouth daily. 100 tablet 3     CENTRUM SILVER OR TABS 1 TABLET DAILY       citalopram (CELEXA) 20 MG tablet TAKE 1 TABLET BY MOUTH  DAILY (Patient taking differently: as needed ) 90 tablet 1     furosemide (LASIX) 20 MG tablet Take 1 tablet (20 mg) by mouth daily As needed edema (Patient taking differently: Take 20 mg by mouth every other day As needed edema) 90 tablet 2     levothyroxine (SYNTHROID/LEVOTHROID) 137 MCG tablet TAKE 1 TABLET BY MOUTH  DAILY 90 tablet 0     simvastatin (ZOCOR) 20 MG tablet TAKE 1 TABLET BY MOUTH AT  BEDTIME 90 tablet 2     timolol maleate (TIMOPTIC) 0.5 % ophthalmic solution        XALATAN 0.005 % OP SOLN qd       fluticasone (FLONASE) 50 MCG/ACT nasal spray Spray 2 sprays into both nostrils daily (Patient not taking: Reported on 9/23/2021) 15.8 mL 1       Allergies   Allergen Reactions     No Known Drug Allergies        No family history on file.    Additional medical/Social/Surgical histories reviewed in [facility] and updated as appropriate.       PHYSICAL EXAM  /62   Ht 1.778 m (5' 10\")   Wt 93.9 kg (207 lb)   BMI 29.70 kg/m      General  - normal appearance, in no obvious distress  Musculoskeletal - hip right  - stance: without limp, no obvious leg length discrepancy, shortened stride length.  - inspection: no swelling or effusion,  normal " bone and joint alignment, no obvious deformity  - palpation: tender over the greater trochanter  - ROM: normal and painless flexion, extension, IR, ER, adduction and abduction  - strength: 5/5 in all planes  - special tests:  (-) AHSAN/FADIR  - normal respiratory pattern, non-labored  Musculoskeletal - knee right  - inspection: no swelling or effusion, normal muscle tone, normal bone and joint alignment, no obvious deformity  - palpation: no joint line tenderness, patellar tendon non-tender, tender medial patellar facet  - ROM: 135 degrees flexion, -5 degrees extension, not painful, crepitus with weight-bearing flexion  - strength: 5/5 in flexion, 4/5 in extension  - neuro: no sensory or motor deficit  - special tests:  (-) Lachman  (-) anterior drawer  (-) Becky  (-) Thessaly  (-) varus at 0 and 30 degrees flexion  (-) valgus at 0 and 30 degrees flexion  (+) Nando s compression test  (+) patellar grind  (-) patellar apprehension  Neuro  - no sensory or motor deficit, grossly normal coordination, normal muscle tone  Skin  - no ecchymosis, erythema, warmth, or induration, no obvious rash  Psych  - interactive, appropriate, normal mood and affect      IMAGING : XR HIP RIGHT 3V and KNEE RIGHT 3V. Final results and radiologist's interpretation, available in the [facility] health record. Images were reviewed with the patient/family members in the office today. My personal interpretation of the performed imaging is moderate osteoarthritic change with osteophyte formation at the acetabulum.     Right knee with moderate to femoral compartment narrowing, mainly at the medial aspect of trochlea.  Patellar subluxation medially     ASSESSMENT & PLAN  Mr. Beckett is a 85 year old year old male who presents to clinic today with mild lateral hip pain after walking as well as sensation of knee instability at times without pain.    Based on x-ray findings, I do think it is patellofemoral arthritis is limited soft to weaken quadriceps  muscle.  I do think this is providing a sensation of instability and feeling of giving way.    In regard to his right hip he does have other osteoarthritic component however his pain is localizing towards the trochanteric region.  Discussed trochanteric bursitis/gluteal tendinopathy is likely culprit.    Diagnosis:  1.  Gluteus medius tendinopathy right hip.  2.  Primary arthritis of right hip.  3.  Patellofemoral osteoarthritis right knee.  4.  Weakness of right lower extremity    Treatment options discussed and at this time would like him to start a home exercise program for gluteus medius, pelvi femoral strengthening as well as quadriceps focused knee exercises.  Did discuss consideration for diagnostic/therapeutic corticosteroid injection versus formal physical therapy.  He wishes to hold off on this aspect at this time.  He notes that he is quite functional and wants to be more proactive in preventing this from becoming significant pain that bothers him. May continue CBD oil  If  Helping. Discussed lack of literature /studies to cite scientific efficacy.    Follow-up in 6 to 8 weeks if no improvement.    It was a pleasure seeing Abundio today.    48 minutes on date of the encounter doing chart review, history and examination, independent imaging review, documentation, and additional activities noted above.      Amrit Tilley DO, [facility]  Primary Care Sports Medicine        Again, thank you for allowing me to participate in the care of your patient.        Sincerely,        Amrit Tilley DO

## 2021-09-23 NOTE — PATIENT INSTRUCTIONS
Thank you for choosing [facility]    [facility] CLINIC LOCATIONS  [place] [address] [address] [facility], [address]th [place]   [place], [place], [address] [place] [address]   [phone]       APPOINTMENTS: [phone]    CARE QUESTIONS: [phone], #3    BILLING QUESTIONS: [phone]    FAX NUMBER: [phone]            Steroid Injection Information:    A corticosteroid injection was administered at your visit today.  The area of injection may be sore, slightly swollen for 1-2 days afterward.  Immediately after injection, you may have an area of numbness, which is caused by the local anesthetic, lidocaine (similar to novacaine) in the shot.  This medicine will wear off in about 4 hours.  In addition to the lidocaine, a steroid medication was injected, called triamcinolone acetate.  This medication is intended to provide long-acting antiinflammatory / pain relief.  It may take 2-5 days for this medication to provide noticeable relief.      After an injection, Dr. Tilley recommends:      Protecting the area wearing a bandage or gauze pad for at least 24 hours.      Using ice; ice bag or frozen vegetables over the injection site every one to two hours when able (for 15 minutes at a time).        Avoid any strenuous activity even if the knee is already feeling better immediately afterward. Light stretching is encouraged but refrain from home exercise program and increasing activity level for about 48 hours.      Avoid soaking in a hot tub, bath, or pool for 48 hours after injection. Showering is fine!      Watch for signs of infection, including increasing pain, redness and swelling that last more than 48 hours after injection.           Trochanteric Bursitis / Gluteal Tendinopathy    WHAT IS TROCHANTERIC BURSITIS?    Bursitis is irritation or inflammation of the bursa. A bursa is a fluid-filled sac that acts as a cushion between tendons,  bones, and skin. There is a bump on the outer side of the upper part of the thigh bone (femur) called the greater trochanter. The trochanteric bursa is located over the greater trochanter. When this bursa is inflamed it is called trochanteric bursitis.          WHAT IS THE CAUSE?     The trochanteric bursa may be inflamed by a group of muscles or tendons rubbing over the bursa and causing friction against the thigh bone. Your iliotibial band goes from the iliac crest of your pelvis down the outer side of your thigh and attaches just below the knee. A tight iliotibial band can lead to trochanteric bursitis. This injury can occur with running, walking, or bicycling, especially when the bicycle seat is too high.    Trochanteric bursitis may also be caused by a fall, by a spine disorder, by differences in the length of your legs, or as a complication of hip surgery.    WHAT ARE THE SYMPTOMS?    You have pain on the upper outer area of your thigh or on the side of your hip. The pain is worse when you walk, bicycle, or go up or down stairs. You have pain when you move your thigh bone and feel tenderness in the area over the greater trochanter.    HOW IS IT DIAGNOSED?    Your healthcare provider will ask about your symptoms and examine your hip and thigh.    HOW IS IT TREATED?    To treat this condition:    Put an ice pack, gel pack, or package of frozen vegetables wrapped in a cloth on the painful area every 3 to 4 hours for up to 20 minutes at a time until the pain goes away.  Take an anti-inflammatory medicine, such as ibuprofen, as directed by your provider. Nonsteroidal anti-inflammatory medicines (NSAIDs) may cause stomach bleeding and other problems. These risks increase with age. Read the label and take as directed. Unless recommended by your healthcare provider, do not take for more than 10 days.  Follow your provider s instructions for doing exercises to help you recover.    While you are recovering from your  injury you will need to change your sport or activity to one that does not make your condition worse. For example, you may need to swim instead of running or bicycling. If you are bicycling, you may need to lower your bicycle seat.    A bursa that is only mildly inflamed and has just started to hurt may improve within a few weeks. A bursa that is significantly inflamed and has been painful for a long time may take up to a few months to improve. You need to stop doing the activities that cause pain until your bursa has healed.    Follow your healthcare provider's instructions. Ask your provider:    How and when you will hear your test results  How long it will take to recover  What activities you should avoid and when you can return to your normal activities  How to take care of yourself at home  What symptoms or problems you should watch for and what to do if you have them  Make sure you know when you should come back for a checkup.    HOW CAN I HELP PREVENT TROCHANTERIC BURSITIS?    Trochanteric bursitis is best prevented by warming up properly and stretching the muscles on the outer side of your upper thigh.    Developed by [employer].  Published by [employer].  Copyright  2014 [employer] and/or one of its subsidiaries. All rights reserved.    You can do the first 3 stretches to begin stretching the muscles that run along the outside of your hip. You can do the strengthening exercises when the sharp pain lessens.    STRETCHING EXERCISES    Gluteal stretch: Lie on your back with both knees bent. Rest the ankle on your injured side over the knee of your other leg. Grasp the thigh of the leg on the uninjured side and pull toward your chest. You will feel a stretch along the buttocks on the injured side and possibly along the outside of your hip. Hold the stretch for 15 to 30 seconds. Repeat 3 times.    Iliotibial band stretch, standing: Cross your uninjured leg in front of the other leg and bend down and  reach toward the inside of your back foot. Do not bend your knees. Hold this position for 15 to 30 seconds. Return to the starting position. Repeat 3 times.  Iliotibial band stretch, side-leaning: Stand sideways near a wall with your injured side closest to the wall. Place a hand on the wall for support. Cross the leg farther from the wall over the other leg. Keep the foot closest to the wall flat on the floor. Lean your hips into the wall. Hold the stretch for 15 to 30 seconds. Repeat 3 times.    STRENGTHENING EXERCISES    Straight leg raise: Lie on your back with your legs straight out in front of you. Bend the knee on your uninjured side and place the foot flat on the floor. Tighten the thigh muscle on your injured side and lift your leg about 8 inches off the floor. Keep your leg straight and your thigh muscle tight. Slowly lower your leg back down to the floor. Do 2 sets of 15.    Prone hip extension: Lie on your stomach with your legs straight out behind you. Fold your arms under your head and rest your head on your arms. Draw your belly button in towards your spine and tighten your abdominal muscles. Tighten the buttocks and thigh muscles of the leg on your injured side and lift the leg off the floor about 8 inches. Keep your leg straight. Hold for 5 seconds. Then lower your leg and relax. Do 2 sets of 15.    Side-lying leg lift: Lie on your uninjured side. Tighten the front thigh muscles on your injured leg and lift that leg 8 to 10 inches (20 to 25 centimeters) away from the other leg. Keep the leg straight and lower it slowly. Do 2 sets of 15.    Wall squat with a ball: Stand with your back, shoulders, and head against a wall. Look straight ahead. Keep your shoulders relaxed and your feet 3 feet (90 centimeters) from the wall and shoulder's width apart. Place a soccer or basketball-sized ball behind your back. Keeping your back against the wall, slowly squat down to a 45-degree angle. Your thighs will not  yet be parallel to the floor. Hold this position for 10 seconds and then slowly slide back up the wall. Repeat 10 times. Build up to 2 sets of 15.    Clam exercise: Lie on your uninjured side with your hips and knees bent and feet together. Slowly raise your top leg toward the ceiling while keeping your heels touching each other. Hold for 2 seconds and lower slowly. Do 2 sets of 15 repetitions.    Side plank: Lie on your side with your legs, hips, and shoulders in a straight line. Prop yourself up onto your forearm with your elbow directly under your shoulder. Lift your hips off the floor and balance on your forearm and the outside of your foot. Try to hold this position for 15 seconds and then slowly lower your hip to the ground. Switch sides and repeat. Work up to holding for 1 minute. This exercise can be made easier by starting with your knees and hips flexed toward your chest.    The plank: Lie on your stomach resting on our forearms. With your legs straight, lift your hips off the floor until they are in line with your shoulders. Support yourself on your forearms and toes. Hold this position for 15 seconds. (If this is too difficult, you can modify it by placing your knees on the floor.) Repeat 3 times. Work up to increasing your hold time to 30 to 60 seconds.    Developed by [employer].  Published by [employer].  Copyright  2014 [employer] and/or one of its subsidiaries. All rights reserved.              Standing hamstring stretch: Put the heel of the leg on your injured side on a stool about 15 inches high. Keep your leg straight. Lean forward, bending at the hips, until you feel a mild stretch in the back of your thigh. Make sure you don't roll your shoulders or bend at the waist when doing this or you will stretch your lower back instead of your leg. Hold the stretch for 15 to 30 seconds. Repeat 3 times.    Quadriceps stretch: Stand at an arm's length away from the wall with your injured side  farthest from the wall. Facing straight ahead, brace yourself by keeping one hand against the wall. With your other hand, grasp the ankle on your injured side and pull your heel toward your buttocks. Don't arch or twist your back. Keep your knees together. Hold this stretch for 15 to 30 seconds.    Side-lying leg lift: Lie on your uninjured side. Tighten the front thigh muscles on your injured leg and lift that leg 8 to 10 inches (20 to 25 centimeters) away from the other leg. Keep the leg straight and lower it slowly. Do 2 sets of 15.    Quad sets: Sit on the floor with your injured leg straight and your other leg bent. Press the back of the knee of your injured leg against the floor by tightening the muscles on the top of your thigh. Hold this position 10 seconds. Relax. Do 2 sets of 15.  Straight leg raise: Lie on your back with your legs straight out in front of you. Bend the knee on your uninjured side and place the foot flat on the floor. Tighten the thigh muscle on your injured side and lift your leg about 8 inches off the floor. Keep your leg straight and your thigh muscle tight. Slowly lower your leg back down to the floor. Do 2 sets of 15.    Clam exercise: Lie on your uninjured side with your hips and knees bent and feet together. Slowly raise your top leg toward the ceiling while keeping your heels touching each other. Hold for 2 seconds and lower slowly. Do 2 sets of 15 repetitions.    Wall squat with a ball: Stand with your back, shoulders, and head against a wall. Look straight ahead. Keep your shoulders relaxed and your feet 3 feet (90 centimeters) from the wall and shoulder's width apart. Place a soccer or basketball-sized ball behind your back. Keeping your back against the wall, slowly squat down to a 45-degree angle. Your thighs will not yet be parallel to the floor. Hold this position for 10 seconds and then slowly slide back up the wall. Repeat 10 times. Build up to 2 sets of 15.    Knee  stabilization: Wrap a piece of elastic tubing around the ankle of your uninjured leg. Tie a knot in the other end of the tubing and close it in a door at about ankle height.  Stand facing the door on the leg without tubing (your injured leg) and bend your knee slightly, keeping your thigh muscles tight. Stay in this position while you move the leg with the tubing (the uninjured leg) straight back behind you. Do 2 sets of 15.  Turn 90 degrees so the leg without tubing is closest to the door. Move the leg with tubing away from your body. Do 2 sets of 15.  Turn 90 degrees again so your back is to the door. Move the leg with tubing straight out in front of you. Do 2 sets of 15.  Turn your body 90 degrees again so the leg with tubing is closest to the door. Move the leg with tubing across your body. Do 2 sets of 15.  Hold onto a chair if you need help balancing. This exercise can be made more challenging by standing on a firm pillow or foam mat while you move the leg with tubing.    Resisted terminal knee extension: Make a loop with a piece of elastic tubing by tying a knot in both ends. Close the knot in a door at knee height. Step into the loop with your injured leg so the tubing is around the back of your knee. Lift the other foot off the ground and hold onto a chair for balance, if needed. Bend the knee with tubing about 45 degrees. Slowly straighten your leg, keeping your thigh muscle tight as you do this. Repeat 15 times. Do 2 sets of 15. If you need an easier way to do this, stand on both legs for better support while you do the exercise.    Standing calf stretch: Stand facing a wall with your hands on the wall at about eye level. Keep your injured leg back with your heel on the floor. Keep the other leg forward with the knee bent. Turn your back foot slightly inward (as if you were pigeon-toed). Slowly lean into the wall until you feel a stretch in the back of your calf. Hold the stretch for 15 to 30 seconds.  Return to the starting position. Repeat 3 times. Do this exercise several times each day.    Step-up: Stand with the foot of your injured leg on a support 3 to 5 inches (8 to 13 centimeters) high --like a small step or block of wood. Keep your other foot flat on the floor. Shift your weight onto the injured leg on the support. Straighten your injured leg as the other leg comes off the floor. Return to the starting position by bending your injured leg and slowly lowering your uninjured leg back to the floor. Do 2 sets of 15.    Iliotibial band stretch, side-bending: Cross one leg in front of the other leg and lean in the opposite direction from the front leg. Reach the arm on the side of the back leg over your head while you do this. Hold this position for 15 to 30 seconds. Return to the starting position. Repeat 3 times and then switch legs and repeat the exercise.  Developed by [employer].  Published by [employer].  Copyright  2014 [employer] and/or one of its subsidiaries. All rights reserved.

## 2021-10-10 DIAGNOSIS — E03.9 HYPOTHYROIDISM, UNSPECIFIED TYPE: ICD-10-CM

## 2021-10-11 RX ORDER — LEVOTHYROXINE SODIUM 137 UG/1
TABLET ORAL
Qty: 90 TABLET | Refills: 3 | Status: SHIPPED | OUTPATIENT
Start: 2021-10-11 | End: 2022-08-09

## 2021-10-11 NOTE — TELEPHONE ENCOUNTER
Routing refill request to provider for review/approval because:  Labs not current:    TSH   Date Value Ref Range Status   08/17/2020 3.93 0.40 - 4.00 mU/L Final       Ally Leonardo RN

## 2021-10-11 NOTE — TELEPHONE ENCOUNTER
I have filled medicine as requested but patient is overdue for thyroid function test.  Needs thyroid function profile done prior to next month.  Orders have been placed, please inform

## 2021-10-13 ENCOUNTER — MYC MEDICAL ADVICE (OUTPATIENT)
Dept: INTERNAL MEDICINE | Facility: CLINIC | Age: 86
End: 2021-10-13

## 2021-10-21 ENCOUNTER — LAB (OUTPATIENT)
Dept: LAB | Facility: CLINIC | Age: 86
End: 2021-10-21
Payer: COMMERCIAL

## 2021-10-21 DIAGNOSIS — E03.9 HYPOTHYROIDISM, UNSPECIFIED TYPE: ICD-10-CM

## 2021-10-21 PROCEDURE — 84443 ASSAY THYROID STIM HORMONE: CPT

## 2021-10-21 PROCEDURE — 36415 COLL VENOUS BLD VENIPUNCTURE: CPT

## 2021-10-21 PROCEDURE — 84439 ASSAY OF FREE THYROXINE: CPT

## 2021-10-22 LAB
T4 FREE SERPL-MCNC: 0.91 NG/DL (ref 0.76–1.46)
TSH SERPL DL<=0.005 MIU/L-ACNC: 4.16 MU/L (ref 0.4–4)

## 2021-10-26 ENCOUNTER — OFFICE VISIT (OUTPATIENT)
Dept: DERMATOLOGY | Facility: CLINIC | Age: 86
End: 2021-10-26
Payer: COMMERCIAL

## 2021-10-26 VITALS — DIASTOLIC BLOOD PRESSURE: 62 MMHG | HEART RATE: 99 BPM | SYSTOLIC BLOOD PRESSURE: 126 MMHG | OXYGEN SATURATION: 70 %

## 2021-10-26 DIAGNOSIS — D22.9 NEVUS: ICD-10-CM

## 2021-10-26 DIAGNOSIS — D18.01 ANGIOMA OF SKIN: ICD-10-CM

## 2021-10-26 DIAGNOSIS — L82.0 INFLAMED SEBORRHEIC KERATOSIS: ICD-10-CM

## 2021-10-26 DIAGNOSIS — D48.5 NEOPLASM OF UNCERTAIN BEHAVIOR OF SKIN: ICD-10-CM

## 2021-10-26 DIAGNOSIS — L57.0 ACTINIC KERATOSIS: Primary | ICD-10-CM

## 2021-10-26 DIAGNOSIS — L81.4 LENTIGO: ICD-10-CM

## 2021-10-26 DIAGNOSIS — L82.1 SEBORRHEIC KERATOSIS: ICD-10-CM

## 2021-10-26 PROCEDURE — 17000 DESTRUCT PREMALG LESION: CPT | Mod: 59 | Performed by: PHYSICIAN ASSISTANT

## 2021-10-26 PROCEDURE — 99213 OFFICE O/P EST LOW 20 MIN: CPT | Mod: 25 | Performed by: PHYSICIAN ASSISTANT

## 2021-10-26 PROCEDURE — 17110 DESTRUCTION B9 LES UP TO 14: CPT | Performed by: PHYSICIAN ASSISTANT

## 2021-10-26 PROCEDURE — 88305 TISSUE EXAM BY PATHOLOGIST: CPT | Performed by: DERMATOLOGY

## 2021-10-26 PROCEDURE — 17003 DESTRUCT PREMALG LES 2-14: CPT | Mod: 59 | Performed by: PHYSICIAN ASSISTANT

## 2021-10-26 PROCEDURE — 11102 TANGNTL BX SKIN SINGLE LES: CPT | Mod: 59 | Performed by: PHYSICIAN ASSISTANT

## 2021-10-26 NOTE — PATIENT INSTRUCTIONS
WOUND CARE INSTRUCTIONS   FOR CRYOSURGERY   This area treated with liquid nitrogen should form a blister (areas treated may or may not blister-skin may just turn dark and slough off). You do not need to bandage the area unless a blister forms and breaks (which may be a few days). When the blister breaks, begin daily dressing changes as follows:  1) Clean and dry the area with tap water using clean Q-tip or sterile gauze pad.   2) Apply Polysporin ointment or Bacitracin ointment over entire wound. Do NOT use Neosporin ointment.   3) Cover the wound with a band-aid or sterile non-stick gauze pad and micropore paper tape.   REPEAT THESE INSTRUCTIONS AT LEAST ONCE A DAY UNTIL THE WOUND HAS COMPLETELY HEALED.   It is an old wives tale that a wound heals better when it is exposed to air and allowed to dry out. The wound will heal faster with a better cosmetic result if it is kept moist with ointment and covered with a bandage.   Do not let the wound dry out.   IMPORTANT INFORMATION ON REVERSE SIDE   Supplies Needed:   *Cotton tipped applicators (Q-tips)   *Polysporin ointment or Bacitracin ointment (NOT NEOSPORIN)   *Band-aids, or non stick gauze pads and micropore paper tape   PATIENT INFORMATION   During the healing process you will notice a number of changes. All wounds develop a small halo of redness surrounding the wound. This means healing is occurring. Severe itching with extensive redness usually indicates sensitivity to the ointment or bandage tape used to dress the wound. You should call our office if this develops.   Swelling and/or discoloration around your surgical site is common, particularly when performed around the eye.   All wounds normally drain. The larger the wound the more drainage there will be. After 7-10 days, you will notice the wound beginning to shrink and new skin will begin to grow. The wound is healed when you can see skin has formed over the entire area. A healed wound has a healthy, shiny  look to the surface and is red to dark pink in color to normalize. Wounds may take approximately 4-6 weeks to heal. Larger wounds may take 6-8 weeks. After the wound is healed you may discontinue dressing changes.   You may experience a sensation of tightness as your wound heals. This is normal and will gradually subside.   Your healed wound may be sensitive to temperature changes. This sensitivity improves with time, but if you re having a lot of discomfort, try to avoid temperature extremes.   Patients frequently experience itching after their wound appears to have healed because of the continue healing under the skin. Plain Vaseline will help relieve the itching.       Wound Care Instructions     FOR SUPERFICIAL WOUNDS     [facility] [phone]                 AFTER 24 HOURS YOU SHOULD REMOVE THE BANDAGE AND BEGIN DAILY DRESSING CHANGES AS FOLLOWS:     1) Remove Dressing.     2) Clean and dry the area with tap water using a Q-tip or sterile gauze pad.     3) Apply Vaseline, Aquaphor, Polysporin ointment or Bacitracin ointment over entire wound.  Do NOT use Neosporin ointment.     4) Cover the wound with a band-aid, or a sterile non-stick gauze pad and micropore paper tape    REPEAT THESE INSTRUCTIONS AT LEAST ONCE A DAY UNTIL THE WOUND HAS COMPLETELY HEALED.    It is an old wives tale that a wound heals better when it is exposed to air and allowed to dry out. The wound will heal faster with a better cosmetic result if it is kept moist with ointment and covered with a bandage.    **Do not let the wound dry out.**    Supplies Needed:      *Cotton tipped applicators (Q-tips)    *Vaseline, Aquaphor, Polysporin or Bacitracin Ointment (NOT NEOSPORIN)    *Band-aids or non-stick gauze pads and micropore paper tape.      PATIENT INFORMATION:    During the healing process you will notice a number of changes. All wounds develop a small halo of redness surrounding the wound.  This means healing is occurring.  Severe itching with extensive redness usually indicates sensitivity to the ointment or bandage tape used to dress the wound.  You should call our office if this develops.      Swelling  and/or discoloration around your surgical site is common, particularly when performed around the eye.    All wounds normally drain.  The larger the wound the more drainage there will be.  After 7-10 days, you will notice the wound beginning to shrink and new skin will begin to grow.  The wound is healed when you can see skin has formed over the entire area.  A healed wound has a healthy, shiny look to the surface and is red to dark pink in color to normalize.  Wounds may take approximately 4-6 weeks to heal.  Larger wounds may take 6-8 weeks.  After the wound is healed you may discontinue dressing changes.    You may experience a sensation of tightness as your wound heals. This is normal and will gradually subside.    Your healed wound may be sensitive to temperature changes. This sensitivity improves with time, but if you re having a lot of discomfort, try to avoid temperature extremes.    Patients frequently experience itching after their wound appears to have healed because of the continue healing under the skin.  Plain Vaseline will help relieve the itching.      POSSIBLE COMPLICATIONS    BLEEDIN. Leave the bandage in place.  2. Use tightly rolled up gauze or a cloth to apply direct pressure over the bandage for 30  minutes.  3. Reapply pressure for an additional 30 minutes if necessary  4. Use additional gauze and tape to maintain pressure once the bleeding has stopped.

## 2021-10-26 NOTE — PROGRESS NOTES
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   ROS of the following were done and are negative: Constitutional, Eyes, Ears, Nose,   Mouth, Throat, Cardiovascular, Respiratory, GI, Genitourinary, Musculoskeletal,   Psychiatric, Endocrine, Allergic/Immunologic.    PHYSICAL EXAM:   /62   Pulse 99   SpO2 (!) 70%   Skin exam performed as follows: Type 2 skin. Mood appropriate  Alert and Oriented X 3. Well developed, well nourished in no distress.  General appearance: Normal  Head including face: Normal  Eyes: conjunctiva and lids: Normal  Mouth: Lips, teeth, gums: Normal  Neck: Normal  Chest-breast/axillae: Normal  Back: Normal  Spleen and liver: Normal  Cardiovascular: Exam of peripheral vascular system by observation for swelling, varicosities, edema: Normal  Genitalia: groin, buttocks: Normal  Extremities: digits/nails (clubbing): Normal  Eccrine and Apocrine glands: Normal  Right upper extremity: Normal  Left upper extremity: Normal  Right lower extremity: Normal  Left lower extremity: Normal  Skin: Scalp and body hair: See below    Pt deferred exam of breasts, groin, buttocks: No    Other physical findings:  1. Multiple pigmented macules on extremities and trunk  2. Multiple pigmented macules on face, trunk and extremities  3. Multiple vascular papules on trunk, arms and legs  4. Multiple scattered keratotic plaques  5. Pink gritty papules on the right dorsal hand x 2, left dorsal hand  x 6, left zygoma x 1, left nasal bridge x 1  6. Inflamed keratotic papules on the left medial elbow x 1, back x 1  7. 8 mm pink papule on the left triceps        Except as noted above, no other signs of skin cancer or melanoma.     ASSESSMENT/PLAN:   Benign Full skin cancer screening today. . Patient with history of none  Advised on monthly self exams and 1 year  Patient Education: Appropriate brochures given.    1. Multiple benign appearing nevi on arms, legs and trunk. Discussed ABCDEs of melanoma and sunscreen.   2. Multiple lentigos on arms, legs and trunk. Advised benign, no treatment needed.  3. Multiple scattered angiomas. Advised benign, no treatment needed.   4. Seborrheic keratosis on arms, legs and trunk. Advised benign, no treatment needed.  5. Actinic keratosis on the right dorsal hand x 2, left dorsal hand x 6, left zygoma x 1, left nasal bridge x 1. As precancerous, cryosurgery performed. Advised on blistering and post-op care. Advised if not resolved in 1-2 months to return for evaluation  6. Inflamed seborrheic keratosis on the left medial elbow x 1, back x 1. As physically tender cryosurgery performed. Advised on post op care.   7. R/o BCC on the left triceps. Shave biopsy performed.  Area cleaned and anesthetized with 1% lidocaine with epinephrine.  Dermablade used to remove the lesion and sent to pathology. Bleeding was cauterized. Pt tolerated procedure well with no complications.   8. Lopez to follow up with Primary Care provider regarding elevated blood pressure.            Follow-up: yearly    1.) Patient was asked about new and changing moles. YES  2.) Patient received a complete physical skin examination: YES  3.) Patient was counseled to perform a monthly self skin examination: YES  Scribed By: Loraine Campos, MS, PA-C

## 2021-10-26 NOTE — LETTER
10/26/2021         RE: Abundio Beckett  [address]  [facility] [address]        Dear Colleague,    Thank you for referring your patient, Abundio Beckett, to the [facility]. Please see a copy of my visit note below.    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   ROS of the following were done and are negative: Constitutional, Eyes, Ears, Nose,   Mouth, Throat, Cardiovascular, Respiratory, GI, Genitourinary, Musculoskeletal,   Psychiatric, Endocrine, Allergic/Immunologic.    PHYSICAL EXAM:   /62   Pulse 99   SpO2 (!) 70%   Skin exam performed as follows: Type 2 skin. Mood appropriate  Alert and Oriented X 3. Well developed, well nourished in no distress.  General appearance: Normal  Head including face: Normal  Eyes: conjunctiva and lids: Normal  Mouth: Lips, teeth, gums: Normal  Neck: Normal  Chest-breast/axillae: Normal  Back: Normal  Spleen and liver: Normal  Cardiovascular: Exam of peripheral vascular system by observation for swelling, varicosities, edema: Normal  Genitalia: groin, buttocks: Normal  Extremities: digits/nails (clubbing): Normal  Eccrine and Apocrine glands: Normal  Right upper extremity: Normal  Left upper extremity: Normal  Right lower extremity: Normal  Left lower extremity: Normal  Skin: Scalp and body hair: See below    Pt deferred exam of breasts, groin, buttocks: No    Other physical findings:  1.  Multiple pigmented macules on extremities and trunk  2. Multiple pigmented macules on face, trunk and extremities  3. Multiple vascular papules on trunk, arms and legs  4. Multiple scattered keratotic plaques  5. Pink gritty papules on the right dorsal hand x 2, left dorsal hand x 6, left zygoma x 1, left nasal bridge x 1  6. Inflamed keratotic papules on the left medial elbow x 1, back x 1  7. 8 mm pink papule on the left triceps        Except as noted above, no other signs of skin cancer or melanoma.     ASSESSMENT/PLAN:   Benign Full skin cancer screening today. . Patient with history of none  Advised on monthly self exams and 1 year  Patient Education: Appropriate brochures given.    1. Multiple benign appearing nevi on arms, legs and trunk. Discussed ABCDEs of melanoma and sunscreen.   2. Multiple lentigos on arms, legs and trunk. Advised benign, no treatment needed.  3. Multiple scattered angiomas. Advised benign, no treatment needed.   4. Seborrheic keratosis on arms, legs and trunk. Advised benign, no treatment needed.  5. Actinic keratosis on the right dorsal hand x 2, left dorsal hand x 6, left zygoma x 1, left nasal bridge x 1. As precancerous, cryosurgery performed. Advised on blistering and post-op care. Advised if not resolved in 1-2 months to return for evaluation  6. Inflamed seborrheic keratosis on the left medial elbow x 1, back x 1. As physically tender cryosurgery performed. Advised on post op care.   7. R/o BCC on the left triceps. Shave biopsy performed.  Area cleaned and anesthetized with 1% lidocaine with epinephrine.  Dermablade used to remove the lesion and sent to pathology. Bleeding was cauterized. Pt tolerated procedure well with no complications.   8. Lopez to follow up with Primary Care provider regarding elevated blood pressure.            Follow-up: yearly    1.) Patient was asked about new and changing moles. YES  2.) Patient received a complete physical skin examination: YES  3.)  Patient was counseled to perform a monthly self skin examination: YES  Scribed By: Loraine Campos, MS, PAManuelC          Again, thank you for allowing me to participate in the care of your patient.        Sincerely,        NAGI BolañosC

## 2021-11-01 ENCOUNTER — TELEPHONE (OUTPATIENT)
Dept: DERMATOLOGY | Facility: CLINIC | Age: 86
End: 2021-11-01

## 2021-11-01 LAB
PATH REPORT.COMMENTS IMP SPEC: ABNORMAL
PATH REPORT.COMMENTS IMP SPEC: ABNORMAL
PATH REPORT.COMMENTS IMP SPEC: YES
PATH REPORT.FINAL DX SPEC: ABNORMAL
PATH REPORT.GROSS SPEC: ABNORMAL
PATH REPORT.MICROSCOPIC SPEC OTHER STN: ABNORMAL
PATH REPORT.RELEVANT HX SPEC: ABNORMAL

## 2021-11-01 NOTE — TELEPHONE ENCOUNTER
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 Please schedule excision.

## 2021-11-01 NOTE — LETTER
[facility]  [address]  [address]  [phone]    11/1/2021       Abundio Beckett  [address]   [facility] [place] [facility] [address]      Dear Abundio:    You are scheduled for Mohs Surgery on: 1/13/22 @8:30am.    Please check in at [facility], [address].     You don't need to arrive more than 5-10 minutes prior to your appointment time.     Be sure to eat a good breakfast and bathe and wash your hair prior to surgery.     If you are taking any anti-coagulants that are prescribed by your Doctor (such as Coumadin/Warfarin, Plavix, Aspirin, Ibuprofen), please continue taking them.     However, if you are taking anti-coagulants over the counter without a Doctor's order for a medical condition, please discontinue them 10 days prior to surgery.     Please wear loose comfortable clothing as it could possibly be 4-6 hours until your surgery is completed depending upon how many layers of tissue need to be removed.      Thank you,    HERBERT Vargas MD

## 2021-11-01 NOTE — TELEPHONE ENCOUNTER
Called and LM for patient to call back in regards to biopsy results chiara.    JORDANA Madrigal-BSN-PHN  [facility]  [phone]

## 2021-11-01 NOTE — TELEPHONE ENCOUNTER
Called and spoke to patient.    Educated patient on biopsy results- BCC (mohs).    Educated patient on BCC, mohs, scheduled mohs appointment, and letter/packet sent.    Patient voiced understanding.    JORDANA Madrigal-BSN-N  [facility]  [phone]

## 2021-11-10 ENCOUNTER — MYC MEDICAL ADVICE (OUTPATIENT)
Dept: INTERNAL MEDICINE | Facility: CLINIC | Age: 86
End: 2021-11-10
Payer: COMMERCIAL

## 2021-12-18 ENCOUNTER — HEALTH MAINTENANCE LETTER (OUTPATIENT)
Age: 86
End: 2021-12-18

## 2021-12-24 ENCOUNTER — DOCUMENTATION ONLY (OUTPATIENT)
Dept: LAB | Facility: CLINIC | Age: 86
End: 2021-12-24

## 2021-12-24 DIAGNOSIS — I10 ESSENTIAL HYPERTENSION, BENIGN: Primary | ICD-10-CM

## 2021-12-24 DIAGNOSIS — E03.9 HYPOTHYROIDISM, UNSPECIFIED TYPE: ICD-10-CM

## 2021-12-24 NOTE — PROGRESS NOTES
PATIENT COMING IN FOR LABS 01/07/2022. PLEASE PLACE ORDERS.     IF PATIENT DOES NOT NEED LABS PLEASE HAVE MA CONTACT PATIENT AND CANCEL APPT.     THANK YOU

## 2021-12-29 ENCOUNTER — TELEPHONE (OUTPATIENT)
Dept: INTERNAL MEDICINE | Facility: CLINIC | Age: 86
End: 2021-12-29

## 2021-12-29 DIAGNOSIS — Z20.822 EXPOSURE TO 2019 NOVEL CORONAVIRUS: Primary | ICD-10-CM

## 2021-12-29 NOTE — TELEPHONE ENCOUNTER
Reason for Call: Request for an order or referral:    Order or referral being requested order for covid test    Date needed: as soon as possible    Has the patient been seen by the PCP for this problem? Not Applicable    Additional comments   Pt was exposed to someone with covid needs an covid test   asap     Phone number Patient can be reached at:  Home number on file [phone] (home)   And  [phone] cell  Best Time: anytime     Can we leave a detailed message on this number?  YES  asap please     Call taken on 12/29/2021 at 11:57 AM by Tonie Wheeler

## 2022-01-04 ENCOUNTER — LAB (OUTPATIENT)
Dept: URGENT CARE | Facility: URGENT CARE | Age: 87
End: 2022-01-04
Payer: COMMERCIAL

## 2022-01-04 DIAGNOSIS — Z20.822 EXPOSURE TO 2019 NOVEL CORONAVIRUS: ICD-10-CM

## 2022-01-04 PROCEDURE — U0005 INFEC AGEN DETEC AMPLI PROBE: HCPCS

## 2022-01-04 PROCEDURE — U0003 INFECTIOUS AGENT DETECTION BY NUCLEIC ACID (DNA OR RNA); SEVERE ACUTE RESPIRATORY SYNDROME CORONAVIRUS 2 (SARS-COV-2) (CORONAVIRUS DISEASE [COVID-19]), AMPLIFIED PROBE TECHNIQUE, MAKING USE OF HIGH THROUGHPUT TECHNOLOGIES AS DESCRIBED BY CMS-2020-01-R: HCPCS

## 2022-01-05 LAB — SARS-COV-2 RNA RESP QL NAA+PROBE: NEGATIVE

## 2022-01-07 ENCOUNTER — LAB (OUTPATIENT)
Dept: LAB | Facility: CLINIC | Age: 87
End: 2022-01-07
Payer: COMMERCIAL

## 2022-01-07 DIAGNOSIS — I63.81 LACUNAR INFARCTION (H): ICD-10-CM

## 2022-01-07 DIAGNOSIS — E03.9 HYPOTHYROIDISM, UNSPECIFIED TYPE: ICD-10-CM

## 2022-01-07 DIAGNOSIS — I10 ESSENTIAL HYPERTENSION, BENIGN: ICD-10-CM

## 2022-01-07 LAB
ALBUMIN SERPL-MCNC: 3.8 G/DL (ref 3.4–5)
ALP SERPL-CCNC: 69 U/L (ref 40–150)
ALT SERPL W P-5'-P-CCNC: 16 U/L (ref 0–70)
ANION GAP SERPL CALCULATED.3IONS-SCNC: 7 MMOL/L (ref 3–14)
AST SERPL W P-5'-P-CCNC: 11 U/L (ref 0–45)
BILIRUB SERPL-MCNC: 0.8 MG/DL (ref 0.2–1.3)
BUN SERPL-MCNC: 12 MG/DL (ref 7–30)
CALCIUM SERPL-MCNC: 9 MG/DL (ref 8.5–10.1)
CHLORIDE BLD-SCNC: 104 MMOL/L (ref 94–109)
CO2 SERPL-SCNC: 26 MMOL/L (ref 20–32)
CREAT SERPL-MCNC: 1.12 MG/DL (ref 0.66–1.25)
ERYTHROCYTE [DISTWIDTH] IN BLOOD BY AUTOMATED COUNT: 12.2 % (ref 10–15)
GFR SERPL CREATININE-BSD FRML MDRD: 64 ML/MIN/1.73M2
GLUCOSE BLD-MCNC: 124 MG/DL (ref 70–99)
HCT VFR BLD AUTO: 43.3 % (ref 40–53)
HGB BLD-MCNC: 14.2 G/DL (ref 13.3–17.7)
MCH RBC QN AUTO: 32.4 PG (ref 26.5–33)
MCHC RBC AUTO-ENTMCNC: 32.8 G/DL (ref 31.5–36.5)
MCV RBC AUTO: 99 FL (ref 78–100)
PLATELET # BLD AUTO: 122 10E3/UL (ref 150–450)
POTASSIUM BLD-SCNC: 3.9 MMOL/L (ref 3.4–5.3)
PROT SERPL-MCNC: 7.2 G/DL (ref 6.8–8.8)
RBC # BLD AUTO: 4.38 10E6/UL (ref 4.4–5.9)
SODIUM SERPL-SCNC: 137 MMOL/L (ref 133–144)
TSH SERPL DL<=0.005 MIU/L-ACNC: 2.99 MU/L (ref 0.4–4)
WBC # BLD AUTO: 7.2 10E3/UL (ref 4–11)

## 2022-01-07 PROCEDURE — 84443 ASSAY THYROID STIM HORMONE: CPT

## 2022-01-07 PROCEDURE — 80053 COMPREHEN METABOLIC PANEL: CPT

## 2022-01-07 PROCEDURE — 36415 COLL VENOUS BLD VENIPUNCTURE: CPT

## 2022-01-07 PROCEDURE — 85027 COMPLETE CBC AUTOMATED: CPT

## 2022-01-07 PROCEDURE — 80061 LIPID PANEL: CPT

## 2022-01-10 RX ORDER — AMLODIPINE BESYLATE 5 MG/1
TABLET ORAL
Qty: 90 TABLET | Refills: 0 | Status: SHIPPED | OUTPATIENT
Start: 2022-01-10 | End: 2022-01-11

## 2022-01-10 NOTE — PROGRESS NOTES
Surgical Office Location:  [facility]  [address] [place] [id number]

## 2022-01-10 NOTE — TELEPHONE ENCOUNTER
Routing refill request to provider for review/approval because:  Drug interaction warning  Genoveva Hilliard RN

## 2022-01-10 NOTE — TELEPHONE ENCOUNTER
I have filled prescription but ongoing refills will require a clinic visit as patient has not been seen in over a year

## 2022-01-11 ENCOUNTER — OFFICE VISIT (OUTPATIENT)
Dept: INTERNAL MEDICINE | Facility: CLINIC | Age: 87
End: 2022-01-11
Payer: COMMERCIAL

## 2022-01-11 VITALS
RESPIRATION RATE: 16 BRPM | HEART RATE: 70 BPM | WEIGHT: 208 LBS | BODY MASS INDEX: 29.78 KG/M2 | HEIGHT: 70 IN | OXYGEN SATURATION: 99 % | DIASTOLIC BLOOD PRESSURE: 68 MMHG | TEMPERATURE: 97.3 F | SYSTOLIC BLOOD PRESSURE: 122 MMHG

## 2022-01-11 DIAGNOSIS — E78.5 HYPERLIPIDEMIA LDL GOAL <100: ICD-10-CM

## 2022-01-11 DIAGNOSIS — Z00.00 ENCOUNTER FOR SUBSEQUENT ANNUAL WELLNESS VISIT IN MEDICARE PATIENT: Primary | ICD-10-CM

## 2022-01-11 DIAGNOSIS — I10 ESSENTIAL HYPERTENSION, BENIGN: ICD-10-CM

## 2022-01-11 DIAGNOSIS — E03.9 HYPOTHYROIDISM, UNSPECIFIED TYPE: ICD-10-CM

## 2022-01-11 DIAGNOSIS — R60.9 EDEMA, UNSPECIFIED TYPE: ICD-10-CM

## 2022-01-11 DIAGNOSIS — I63.81 LACUNAR INFARCTION (H): ICD-10-CM

## 2022-01-11 DIAGNOSIS — Z85.46 HISTORY OF PROSTATE CANCER: ICD-10-CM

## 2022-01-11 LAB
CHOLEST SERPL-MCNC: 132 MG/DL
HDLC SERPL-MCNC: 61 MG/DL
LDLC SERPL CALC-MCNC: 51 MG/DL
NONHDLC SERPL-MCNC: 71 MG/DL
TRIGL SERPL-MCNC: 102 MG/DL

## 2022-01-11 PROCEDURE — 99397 PER PM REEVAL EST PAT 65+ YR: CPT | Performed by: INTERNAL MEDICINE

## 2022-01-11 RX ORDER — AMLODIPINE BESYLATE 5 MG/1
5 TABLET ORAL DAILY
Qty: 90 TABLET | Refills: 0 | Status: SHIPPED | OUTPATIENT
Start: 2022-01-11 | End: 2022-03-31

## 2022-01-11 RX ORDER — SIMVASTATIN 20 MG
20 TABLET ORAL AT BEDTIME
Qty: 90 TABLET | Refills: 3 | Status: SHIPPED | OUTPATIENT
Start: 2022-01-11 | End: 2022-10-13

## 2022-01-11 RX ORDER — FUROSEMIDE 20 MG
TABLET ORAL
Qty: 90 TABLET | Refills: 1 | Status: SHIPPED | OUTPATIENT
Start: 2022-01-11 | End: 2022-10-19

## 2022-01-11 ASSESSMENT — ACTIVITIES OF DAILY LIVING (ADL): CURRENT_FUNCTION: NO ASSISTANCE NEEDED

## 2022-01-11 ASSESSMENT — MIFFLIN-ST. JEOR: SCORE: 1629.73

## 2022-01-11 NOTE — PROGRESS NOTES
"SUBJECTIVE:   Abundio Beckett is a 86 year old male who presents for Preventive Visit.    Patient has been advised of split billing requirements and indicates understanding: Yes   Are you in the first 12 months of your Medicare coverage?  No    Healthy Habits:     In general, how would you rate your overall health?  Good    Frequency of exercise:  2-3 days/week    Duration of exercise:  30-45 minutes    Do you usually eat at least 4 servings of fruit and vegetables a day, include whole grains    & fiber and avoid regularly eating high fat or \"junk\" foods?  No    Taking medications regularly:  Yes    Medication side effects:  None    Ability to successfully perform activities of daily living:  No assistance needed    Home Safety:  No safety concerns identified    Hearing Impairment:  No hearing concerns    In the past 6 months, have you been bothered by leaking of urine? Yes    In general, how would you rate your overall mental or emotional health?  Excellent      PHQ-2 Total Score: 0    Additional concerns today:  Yes    Do you feel safe in your environment? Yes    Have you ever done Advance Care Planning? (For example, a Health Directive, POLST, or a discussion with a medical provider or your loved ones about your wishes): No, advance care planning information given to patient to review.  Patient declined advance care planning discussion at this time.       Fall risk  Fallen 2 or more times in the past year?: (P) No  Any fall with injury in the past year?: (P) No  Cognitive Screening   1) Repeat 3 items (Leader, Season, Table)    2) Clock draw: NORMAL  3) 3 item recall: Recalls 2 objects   Results: NORMAL clock, 2 items recalled: COGNITIVE IMPAIRMENT LESS LIKELY    Mini-CogTM Copyright BRANDON Shelby. Licensed by the author for use in [facility]; reprinted with permission (trudi@.[facility]). All rights reserved.      Do you have sleep apnea, excessive snoring or daytime drowsiness?: no    Reviewed and updated " as needed this visit by clinical staff                Reviewed and updated as needed this visit by Provider               Social History     Tobacco Use     Smoking status: Never Smoker     Smokeless tobacco: Never Used   Substance Use Topics     Alcohol use: Yes         Alcohol Use 9/24/2018   Prescreen: >3 drinks/day or >7 drinks/week? No   Prescreen: >3 drinks/day or >7 drinks/week? -       Current providers sharing in care for this patient include:   Patient Care Team:  Colton Chris MD as PCP - General  Colton Chris MD as Assigned PCP  Sandee Butt PA-C as Physician Assistant (Dermatology)  Amrit Tilley DO as Assigned Musculoskeletal Provider  Loraine Campos PA-C as Assigned Surgical Provider    The following health maintenance items are reviewed in Epic and correct as of today:  Health Maintenance Due   Topic Date Due     ANNUAL REVIEW OF  ORDERS  Never done     DTAP/TDAP/TD IMMUNIZATION (3 - Td or Tdap) 08/26/2018     MEDICARE ANNUAL WELLNESS VISIT  11/04/2021     FALL RISK ASSESSMENT  11/04/2021       Immunization History   Administered Date(s) Administered     COVID-19,PF,Pfizer (12+ Yrs) 01/31/2021, 02/21/2021, 10/05/2021     HEPA 06/05/2000, 06/11/2002     HepB-Adult 06/13/2002, 07/10/2002     Influenza (H1N1) 12/21/2009     Influenza (High Dose) 3 valent vaccine 10/01/2015, 09/07/2016, 09/14/2017, 09/18/2018, 10/15/2020, 10/05/2021     Influenza (IIV3) PF 11/22/2000, 09/25/2010, 09/03/2011     Influenza Quad, Recombinant, pf(RIV4) (Flublok) 10/01/2019     Influenza, Quad, High Dose, Pf, 65yr+ (Fluzone HD) 09/30/2021     LYMERIX 05/12/1999, 06/24/1999, 06/02/2000     Pneumo Conj 13-V (2010&after) 06/02/2016     Pneumococcal 23 valent 10/11/1999, 05/22/2006     Poliovirus, inactivated (IPV) 06/05/2000     TD (ADULT, 7+) 01/13/1997, 08/26/2008     Typhoid IM 06/13/2002     Yellow Fever 06/13/2002     Zoster vaccine recombinant adjuvanted (SHINGRIX) 09/30/2019, 12/12/2019      "Zoster vaccine, live 07/16/2009       Review of Systems  CONSTITUTIONAL: NEGATIVE for fever, chills, change in weight  EYES: NEGATIVE for vision changes or irritation  ENT/MOUTH: NEGATIVE for ear, mouth and throat problems  RESP: NEGATIVE for significant cough or SOB  CV: NEGATIVE for chest pain, palpitations or peripheral edema  GI: NEGATIVE for nausea, abdominal pain, heartburn, or change in bowel habits  : NEGATIVE for frequency, dysuria, or hematuria  MUSCULOSKELETAL: NEGATIVE for significant arthralgias or myalgia  NEURO: NEGATIVE for weakness, dizziness or paresthesias  ENDOCRINE: NEGATIVE for temperature intolerance, skin/hair changes  HEME: NEGATIVE for bleeding problems  PSYCHIATRIC: NEGATIVE for changes in mood or affect    OBJECTIVE:   /68   Pulse 70   Temp 97.3  F (36.3  C) (Temporal)   Resp 16   Ht 1.778 m (5' 10\")   Wt 94.3 kg (208 lb)   SpO2 99%   BMI 29.84 kg/m   Estimated body mass index is 29.7 kg/m  as calculated from the following:    Height as of 9/23/21: 1.778 m (5' 10\").    Weight as of 9/23/21: 93.9 kg (207 lb).  Physical Exam  GENERAL:  alert and no distress  EYES: Eyes grossly normal to inspection, PERRL and conjunctivae and sclerae normal  HENT: ear canals and TM's normal, nose and mouth without ulcers or lesions  NECK: no adenopathy, no asymmetry, masses, or scars and thyroid normal to palpation  RESP: lungs clear to auscultation - no rales, rhonchi or wheezes  CV: regular rate and rhythm, normal S1 S2, no S3 or S4, no click or rub  MS: no gross musculoskeletal defects noted, no edema  NEURO: No focal changes  PSYCH: mentation appears normal, affect normal/bright      ASSESSMENT / PLAN:   (Z00.00) Encounter for subsequent annual wellness visit in Medicare patient  (primary encounter diagnosis)  Comment: advised ADT update per pharmacy  Plan: We discussed his slightly elevated blood sugar from baseline as well as his slightly improved but slightly low platelet count from " "baseline.  We have opted to observe this and consider rechecking for reassurance and comparison in 6-month    (I63.81) Lacunar infarction (H)  Comment: noted stable w/o active complaints  Plan: Lipid Profile, simvastatin (ZOCOR) 20 MG tablet        Stay on ASA as ordered    (E03.9) Hypothyroidism, unspecified type  Comment:   TSH   Date Value Ref Range Status   01/07/2022 2.99 0.40 - 4.00 mU/L Final   08/17/2020 3.93 0.40 - 4.00 mU/L Final     Plan: stable on therapy    (I10) Essential hypertension, benign  Comment: stable on therapy  Plan: amLODIPine (NORVASC) 5 MG tablet, furosemide         (LASIX) 20 MG tablet            (Z85.46) History of prostate cancer  Comment: noted as baseline history  Plan:     (E78.5) Hyperlipidemia LDL goal <100  Comment: labs as fasting on statin therapy  Plan: simvastatin (ZOCOR) 20 MG tablet            (R60.9) Edema, unspecified type  Comment: refilled for every other day dosing  Plan: furosemide (LASIX) 20 MG tablet              Patient has been advised of split billing requirements and indicates understanding: Yes  COUNSELING:  Reviewed preventive health counseling, as reflected in patient instructions       Regular exercise       Healthy diet/nutrition    Estimated body mass index is 29.7 kg/m  as calculated from the following:    Height as of 9/23/21: 1.778 m (5' 10\").    Weight as of 9/23/21: 93.9 kg (207 lb).        He reports that he has never smoked. He has never used smokeless tobacco.      Appropriate preventive services were discussed with this patient, including applicable screening as appropriate for cardiovascular disease, diabetes, osteopenia/osteoporosis, and glaucoma.  As appropriate for age/gender, discussed screening for colorectal cancer, prostate cancer, breast cancer, and cervical cancer. Checklist reviewing preventive services available has been given to the patient.    Reviewed patients plan of care and provided an AVS. The Basic Care Plan (routine screening " as documented in Health Maintenance) for Abundio meets the Care Plan requirement. This Care Plan has been established and reviewed with the Patient.    Counseling Resources:  ATP IV Guidelines  Pooled Cohorts Equation Calculator  Breast Cancer Risk Calculator  Breast Cancer: Medication to Reduce Risk  FRAX Risk Assessment  ICSI Preventive Guidelines  Dietary Guidelines for Americans, 2010  USDA's MyPlate  ASA Prophylaxis  Lung CA Screening    Colton Chris MD  [facility]    Identified Health Risks:

## 2022-01-11 NOTE — TELEPHONE ENCOUNTER
Called pt and notified him of approved prescription. Pt did have an appt with Dr. Chris yesterday.     Genoveva Hilliard RN

## 2022-01-13 ENCOUNTER — OFFICE VISIT (OUTPATIENT)
Dept: DERMATOLOGY | Facility: CLINIC | Age: 87
End: 2022-01-13
Payer: COMMERCIAL

## 2022-01-13 VITALS
HEIGHT: 71 IN | BODY MASS INDEX: 29.01 KG/M2 | HEART RATE: 89 BPM | DIASTOLIC BLOOD PRESSURE: 72 MMHG | SYSTOLIC BLOOD PRESSURE: 136 MMHG

## 2022-01-13 DIAGNOSIS — C44.619 BASAL CELL CARCINOMA (BCC) OF LEFT UPPER ARM: Primary | ICD-10-CM

## 2022-01-13 PROCEDURE — 99207 PR NO CHARGE LOS: CPT | Performed by: DERMATOLOGY

## 2022-01-13 PROCEDURE — 11602 EXC TR-EXT MAL+MARG 1.1-2 CM: CPT | Performed by: DERMATOLOGY

## 2022-01-13 PROCEDURE — 88331 PATH CONSLTJ SURG 1 BLK 1SPC: CPT | Performed by: DERMATOLOGY

## 2022-01-13 PROCEDURE — 17110 DESTRUCTION B9 LES UP TO 14: CPT | Performed by: DERMATOLOGY

## 2022-01-13 NOTE — LETTER
1/13/2022         RE: Abundio Beckett  [address]  [place] [id number]        Dear Colleague,    Thank you for referring your patient, Abundio Beckett, to the [facility]. Please see a copy of my visit note below.    Surgical Office Location:  [facility]  [address] [place] [address]      Abundio Beckett is an extremely pleasant 86 year old year old male patient here today for evaluation and managment of basal cell carcinoma on left tricep.  Patient has no other skin complaints today.  Remainder of the HPI, Meds, PMH, Allergies, FH, and SH was reviewed in chart.      Past Medical History:   Diagnosis Date     Basal cell carcinoma      BENIGN PROSTATIC HYPERTROPHY 9/17/2002     Essential hypertension, benign 11/8/2016     Hyperlipidemia LDL goal <100 5/29/2012     Hypertrophy (benign) of prostate     abstracted 7/5/02.     HYPOTHYROIDISM NOS 9/17/2002     Lacunar infarction (H) 9/4/2012     Unspecified hypothyroidism     abstracted 7/5/02.       Past Surgical History:   Procedure Laterality Date     HC SHOULDER ARTHROSCOPY, DX       ZZC REMV PROSTATE,PERINEAL,RADICAL  2000        History reviewed. No pertinent family history.    Social History     Socioeconomic History     Marital status:      Spouse name: Not on file     Number of children: Not on file     Years of education: Not on file     Highest education level: Not on file   Occupational History     Not on file   Tobacco Use     Smoking status: Never Smoker     Smokeless tobacco: Never Used   Substance and Sexual Activity     Alcohol use: Yes     Drug use: No     Sexual activity: Not Currently     Partners: Female   Other Topics Concern     Parent/sibling w/ CABG, MI or angioplasty before 65F 55M? Not Asked   Social History Narrative     Not on file     Social Determinants of Health     Financial Resource Strain: Not on file   Food Insecurity: Not on file   Transportation  "Needs: Not on file   Physical Activity: Not on file   Stress: Not on file   Social Connections: Not on file   Intimate Partner Violence: Not on file   Housing Stability: Not on file       Outpatient Encounter Medications as of 1/13/2022   Medication Sig Dispense Refill     amLODIPine (NORVASC) 5 MG tablet Take 1 tablet (5 mg) by mouth daily 90 tablet 0     aspirin 325 MG EC tablet Take 1 tablet by mouth daily. 100 tablet 3     CENTRUM SILVER OR TABS 1 TABLET DAILY       furosemide (LASIX) 20 MG tablet TAKE 1 TABLET BY MOUTH  DAILY AS NEEDED FOR EDEMA 90 tablet 1     levothyroxine (SYNTHROID/LEVOTHROID) 137 MCG tablet TAKE 1 TABLET BY MOUTH  DAILY 90 tablet 3     simvastatin (ZOCOR) 20 MG tablet Take 1 tablet (20 mg) by mouth At Bedtime 90 tablet 3     timolol maleate (TIMOPTIC) 0.5 % ophthalmic solution        XALATAN 0.005 % OP SOLN qd       [DISCONTINUED] amLODIPine (NORVASC) 5 MG tablet TAKE 1 TABLET BY MOUTH  DAILY 90 tablet 0     [DISCONTINUED] amLODIPine (NORVASC) 5 MG tablet TAKE 1 TABLET BY MOUTH  DAILY 90 tablet 0     [DISCONTINUED] citalopram (CELEXA) 20 MG tablet TAKE 1 TABLET BY MOUTH  DAILY (Patient taking differently: as needed ) 90 tablet 1     [DISCONTINUED] fluticasone (FLONASE) 50 MCG/ACT nasal spray Spray 2 sprays into both nostrils daily (Patient not taking: Reported on 9/23/2021) 15.8 mL 1     [DISCONTINUED] furosemide (LASIX) 20 MG tablet TAKE 1 TABLET BY MOUTH  DAILY AS NEEDED FOR EDEMA 90 tablet 0     [DISCONTINUED] simvastatin (ZOCOR) 20 MG tablet TAKE 1 TABLET BY MOUTH AT  BEDTIME 90 tablet 2     No facility-administered encounter medications on file as of 1/13/2022.             O:   NAD, WDWN, Alert & Oriented, Mood & Affect wnl, Vitals stable   Here today alone   /72   Pulse 89   Ht 1.803 m (5' 11\")   BMI 29.01 kg/m     General appearance normal   Vitals stable   Alert, oriented and in no acute distress     L tricep red scaly ill-defined 8mm plaque   Eyes: " Conjunctivae/lids:Normal     ENT: Lips, buccal mucosa, tongue: normal    MSK:Normal    Cardiovascular: peripheral edema none    Pulm: Breathing Normal    Neuro/Psych: Orientation:Alert and Orientedx3 ; Mood/Affect:normal       MICRO:   L tricep:Unremarkable epidermis, dermis and superficial subcutis. No concerning areas for malignancy.     A/P:  1. Basal cell carcinoma r tricep  EXCISION OF BASAL CELL CARCINOMA, Margins confirmed with FROZEN SECTIONS AND Second intent: After thorough discussion of PGACAC, consent obtained, anesthesia and prep, the margins of the lesion were identified and an elliptical incision was made encompassing the lesion with 4mm margin. The incisions were made through the skin and down to and including the superficial subcutis. The lesion was removed en bloc and submitted for frozen section pathologic review. Clear margins obtained (1.6cm).    REPAIR SECOND INTENT: We discussed the options for wound management in full with the patient including risks/benefits/possible outcomes. Decision made to allow the wound to heal by second intention. EBL minimal; complications none; wound care routine.  The patient was discharged in good condition and will return in one month or prn for wound evaluation.       It was a pleasure speaking to Abundio Beckett today.  Previous clinic notes and pertinent laboratory tests were reviewed prior to Abundio Beckett's visit.  Nature and genetics of benign skin lesions dicussed with patient.  Signs and Symptoms of skin cancer discussed with patient.  Patient encouraged to perform monthly skin exams.  UV precautions reviewed with patient.  Risks of non-melanoma skin cancer discussed with patient   Return to clinic 6 mnths        Again, thank you for allowing me to participate in the care of your patient.        Sincerely,        Dm Vargas MD

## 2022-01-13 NOTE — PROGRESS NOTES
Abundio Beckett is an extremely pleasant 86 year old year old male patient here today for evaluation and managment of basal cell carcinoma on left tricep. Today he notes spot on left arm.  It is itching.  Patient has no other skin complaints today.  Remainder of the HPI, Meds, PMH, Allergies, FH, and SH was reviewed in chart.      Past Medical History:   Diagnosis Date     Basal cell carcinoma      BENIGN PROSTATIC HYPERTROPHY 9/17/2002     Essential hypertension, benign 11/8/2016     Hyperlipidemia LDL goal <100 5/29/2012     Hypertrophy (benign) of prostate     abstracted 7/5/02.     HYPOTHYROIDISM NOS 9/17/2002     Lacunar infarction (H) 9/4/2012     Unspecified hypothyroidism     abstracted 7/5/02.       Past Surgical History:   Procedure Laterality Date     HC SHOULDER ARTHROSCOPY, DX       ZZC REMV PROSTATE,PERINEAL,RADICAL  2000        History reviewed. No pertinent family history.    Social History     Socioeconomic History     Marital status:      Spouse name: Not on file     Number of children: Not on file     Years of education: Not on file     Highest education level: Not on file   Occupational History     Not on file   Tobacco Use     Smoking status: Never Smoker     Smokeless tobacco: Never Used   Substance and Sexual Activity     Alcohol use: Yes     Drug use: No     Sexual activity: Not Currently     Partners: Female   Other Topics Concern     Parent/sibling w/ CABG, MI or angioplasty before 65F 55M? Not Asked   Social History Narrative     Not on file     Social Determinants of Health     Financial Resource Strain: Not on file   Food Insecurity: Not on file   Transportation Needs: Not on file   Physical Activity: Not on file   Stress: Not on file   Social Connections: Not on file   Intimate Partner Violence: Not on file   Housing Stability: Not on file       Outpatient Encounter Medications as of 1/13/2022   Medication Sig Dispense Refill     amLODIPine (NORVASC) 5 MG tablet Take 1 tablet (5  "mg) by mouth daily 90 tablet 0     aspirin 325 MG EC tablet Take 1 tablet by mouth daily. 100 tablet 3     CENTRUM SILVER OR TABS 1 TABLET DAILY       furosemide (LASIX) 20 MG tablet TAKE 1 TABLET BY MOUTH  DAILY AS NEEDED FOR EDEMA 90 tablet 1     levothyroxine (SYNTHROID/LEVOTHROID) 137 MCG tablet TAKE 1 TABLET BY MOUTH  DAILY 90 tablet 3     simvastatin (ZOCOR) 20 MG tablet Take 1 tablet (20 mg) by mouth At Bedtime 90 tablet 3     timolol maleate (TIMOPTIC) 0.5 % ophthalmic solution        XALATAN 0.005 % OP SOLN qd       [DISCONTINUED] amLODIPine (NORVASC) 5 MG tablet TAKE 1 TABLET BY MOUTH  DAILY 90 tablet 0     [DISCONTINUED] amLODIPine (NORVASC) 5 MG tablet TAKE 1 TABLET BY MOUTH  DAILY 90 tablet 0     [DISCONTINUED] citalopram (CELEXA) 20 MG tablet TAKE 1 TABLET BY MOUTH  DAILY (Patient taking differently: as needed ) 90 tablet 1     [DISCONTINUED] fluticasone (FLONASE) 50 MCG/ACT nasal spray Spray 2 sprays into both nostrils daily (Patient not taking: Reported on 9/23/2021) 15.8 mL 1     [DISCONTINUED] furosemide (LASIX) 20 MG tablet TAKE 1 TABLET BY MOUTH  DAILY AS NEEDED FOR EDEMA 90 tablet 0     [DISCONTINUED] simvastatin (ZOCOR) 20 MG tablet TAKE 1 TABLET BY MOUTH AT  BEDTIME 90 tablet 2     No facility-administered encounter medications on file as of 1/13/2022.             O:   NAD, WDWN, Alert & Oriented, Mood & Affect wnl, Vitals stable   Here today alone   /72   Pulse 89   Ht 1.803 m (5' 11\")   BMI 29.01 kg/m     General appearance normal   Vitals stable   Alert, oriented and in no acute distress     L tricep red scaly ill-defined 8mm plaque   L arm inflamed seborrheic keratosis     Eyes: Conjunctivae/lids:Normal     ENT: Lips, buccal mucosa, tongue: normal    MSK:Normal    Cardiovascular: peripheral edema none    Pulm: Breathing Normal    Neuro/Psych: Orientation:Alert and Orientedx3 ; Mood/Affect:normal       MICRO:   L tricep:Unremarkable epidermis, dermis and superficial subcutis. No " concerning areas for malignancy.     A/P:  1. L arm inflamed seborrheic keratosis   LN2:  Treated with LN2 for 5s for 1-2 cycles. Warned risks of blistering, pain, pigment change, scarring, and incomplete resolution.  Advised patient to return if lesions do not completely resolve.  Wound care sheet given.    2. Basal cell carcinoma r tricep  It was a pleasure speaking to Abundio Beckett today.  Previous clinic notes and pertinent laboratory tests were reviewed prior to Abundio Beckett's visit.  Nature and genetics of benign skin lesions dicussed with patient.  Signs and Symptoms of skin cancer discussed with patient.  Patient encouraged to perform monthly skin exams.  UV precautions reviewed with patient.  Risks of non-melanoma skin cancer discussed with patient   Return to clinic 6 mnths      PROCEDURE NOTE  L tricep basal cell carcinoma   EXCISION OF BASAL CELL CARCINOMA, Margins confirmed with FROZEN SECTIONS AND Second intent: After thorough discussion of PGACAC, consent obtained, anesthesia and prep, the margins of the lesion were identified and an elliptical incision was made encompassing the lesion with 4mm margin. The incisions were made through the skin and down to and including the superficial subcutis. The lesion was removed en bloc and submitted for frozen section pathologic review. Clear margins obtained (1.6cm).    REPAIR SECOND INTENT: We discussed the options for wound management in full with the patient including risks/benefits/possible outcomes. Decision made to allow the wound to heal by second intention. EBL minimal; complications none; wound care routine.  The patient was discharged in good condition and will return in one month or prn for wound evaluation.

## 2022-01-13 NOTE — PATIENT INSTRUCTIONS
Open Wound Care         ARM        ? No strenuous activity for 48 hours    ? Take Tylenol as needed for discomfort.                                                .         ? Do not drink alcoholic beverages for 48 hours.    ? Keep the pressure bandage in place for 24 hours. If the bandage becomes blood tinged or loose, reinforce it with gauze and tape.        (Refer to the reverse side of this page for management of bleeding).    ? Remove bandage in 24 hours and begin wound care as follows:     1. Clean area with tap water using a Q tip or gauze pad, (shower / bathe normally)  2. Dry wound with Q tip or gauze pad  3. Apply Aquaphor, Vaseline, Polysporin or Bacitracin Ointment with a Q tip    Do NOT use Neosporin Ointment *  4. Cover the wound with a band-aid or nonstick gauze pad and paper tape.  5. Repeat wound care once a day until wound is completely healed.    It is an old wives tale that a wound heals better when it is exposed to air and allowed to dry out. The wound will heal faster with a better cosmetic result if it is kept moist with ointment and covered with a bandage.  Do not let the wound dry out.      Supplies Needed:                Qtips or gauze pads                Polysporin or Bacitracin Ointment                Bandaids or nonstick gauze pads and paper tape    Wound care kits and brown paper tape are available for purchase at   the pharmacy.    BLEEDIN. Use tightly rolled up gauze or cloth to apply direct pressure over the bandage for 20   minutes.  2. Reapply pressure for an additional 20 minutes if necessary  3. Call the office or go to the nearest emergency room if pressure fails to stop the bleeding.  4. Use additional gauze and tape to maintain pressure once the bleeding has stopped.  5. Begin wound care 24 hours after surgery as directed.                  WOUND HEALING    1. One week after surgery a pink / red halo will form around the outside of the wound.   This is new skin.  2. The  center of the wound will appear yellowish white and produce some drainage.  3. The pink halo will slowly migrate in toward the center of the wound until the wound is covered with new shiny pink skin.  4. There will be no more drainage when the wound is completely healed.  5. It will take six months to one year for the redness to fade.  6. The scar may be itchy, tight and sensitive to extreme temperatures for a year after the surgery.  7. Massaging the area several times a day for several minutes after the wound is completely healed will help the scar soften and normalize faster. Begin massage only after healing is complete.      In case of emergency call: Dr Vargas: [phone]    [facility]: [phone]    [facility]:[phone]

## 2022-03-21 DIAGNOSIS — I10 ESSENTIAL HYPERTENSION, BENIGN: ICD-10-CM

## 2022-03-21 DIAGNOSIS — R60.9 EDEMA, UNSPECIFIED TYPE: ICD-10-CM

## 2022-03-22 RX ORDER — FUROSEMIDE 20 MG
TABLET ORAL
Qty: 100 TABLET | Refills: 2 | OUTPATIENT
Start: 2022-03-22

## 2022-03-22 NOTE — TELEPHONE ENCOUNTER
This refill request is a duplicate previously sent to pharmacy or currently in review.  Refused medication to pharmacy as duplicate.   Ally Leonardo RN

## 2022-03-29 DIAGNOSIS — I10 ESSENTIAL HYPERTENSION, BENIGN: ICD-10-CM

## 2022-03-31 RX ORDER — AMLODIPINE BESYLATE 5 MG/1
TABLET ORAL
Qty: 90 TABLET | Refills: 1 | Status: SHIPPED | OUTPATIENT
Start: 2022-03-31 | End: 2022-07-06

## 2022-03-31 NOTE — TELEPHONE ENCOUNTER
Prescription approved per [facility] Refill Protocol.  Celeste Roberson RN

## 2022-05-16 ENCOUNTER — TELEPHONE (OUTPATIENT)
Dept: DERMATOLOGY | Facility: CLINIC | Age: 87
End: 2022-05-16
Payer: COMMERCIAL

## 2022-05-16 NOTE — TELEPHONE ENCOUNTER
Dr George Haines called and updated on patients continued headache. Orders obtained for medications.   BPs reviewed with MD. [facility]    Phone Message    May a detailed message be left on voicemail: yes     Reason for Call: Other: Pt Abundio has a tender spot behind his ear that bleeds and wants to know if he should be concrned or see a doctor because of his history of skin cancer. [phone]     Action Taken: Message routed to:  Other: [facility]    Travel Screening: Not Applicable

## 2022-05-16 NOTE — TELEPHONE ENCOUNTER
S/w pt who states he has a spot on his scalp that is about 1/4 inch.  States the area bleeds occasionally and is irritated by the arm of his glasses.  Spot is tender to touch.  Has had nurse at facility look at it and said there is a brown spot there.  Pt states has a hx of skin cancer and wondering if needs to be concerned.    Scheduled on 5/19 at 1:15 with Dr. Vargas to look at the spot.      Meredith GUTIÉRREZ RN  [facility]  [phone]

## 2022-05-19 ENCOUNTER — OFFICE VISIT (OUTPATIENT)
Dept: DERMATOLOGY | Facility: CLINIC | Age: 87
End: 2022-05-19
Payer: COMMERCIAL

## 2022-05-19 VITALS — DIASTOLIC BLOOD PRESSURE: 72 MMHG | OXYGEN SATURATION: 96 % | HEART RATE: 66 BPM | SYSTOLIC BLOOD PRESSURE: 150 MMHG

## 2022-05-19 DIAGNOSIS — L82.1 SEBORRHEIC KERATOSIS: ICD-10-CM

## 2022-05-19 DIAGNOSIS — D23.9 DERMAL NEVUS: ICD-10-CM

## 2022-05-19 DIAGNOSIS — D18.01 ANGIOMA OF SKIN: ICD-10-CM

## 2022-05-19 DIAGNOSIS — Z85.828 HISTORY OF SKIN CANCER: ICD-10-CM

## 2022-05-19 DIAGNOSIS — L81.4 LENTIGO: Primary | ICD-10-CM

## 2022-05-19 DIAGNOSIS — L82.0 INFLAMED SEBORRHEIC KERATOSIS: ICD-10-CM

## 2022-05-19 PROCEDURE — 17110 DESTRUCTION B9 LES UP TO 14: CPT | Performed by: DERMATOLOGY

## 2022-05-19 NOTE — LETTER
5/19/2022         RE: Abundio Beckett  [address]  [facility] [address]        Dear Colleague,    Thank you for referring your patient, Abundio Beckett, to the [facility]. Please see a copy of my visit note below.    Abundio Beckett is an extremely pleasant 86 year old year old male patient here today for spot on right ear.   .   Patient states this has been present for a while.  Patient reports the following symptoms:  itching.  Patient reports the following previous treatments none.  These treatments did not work.  Patient reports the following modifying factors none.  Associated symptoms: none.  Patient has no other skin complaints today.  Remainder of the HPI, Meds, PMH, Allergies, FH, and SH was reviewed in chart.      Past Medical History:   Diagnosis Date     Basal cell carcinoma      BENIGN PROSTATIC HYPERTROPHY 9/17/2002     Essential hypertension, benign 11/8/2016     Hyperlipidemia LDL goal <100 5/29/2012     Hypertrophy (benign) of prostate     abstracted 7/5/02.     HYPOTHYROIDISM NOS 9/17/2002     Lacunar infarction (H) 9/4/2012     Unspecified hypothyroidism     abstracted 7/5/02.       Past Surgical History:   Procedure Laterality Date     HC SHOULDER ARTHROSCOPY, DX       ZZC REMV PROSTATE,PERINEAL,RADICAL  2000        History reviewed. No pertinent family history.    Social History     Socioeconomic History     Marital status:      Spouse name: Not on file     Number of children: Not on file     Years of education: Not on file     Highest education level: Not on file   Occupational History     Not on file   Tobacco Use     Smoking status: Never Smoker     Smokeless tobacco: Never Used   Substance and Sexual Activity     Alcohol use: Yes     Drug use: No     Sexual activity: Not Currently     Partners: Female   Other Topics Concern     Parent/sibling w/ CABG, MI or angioplasty before 65F 55M? Not Asked   Social History Narrative     Not  on file     Social Determinants of Health     Financial Resource Strain: Not on file   Food Insecurity: Not on file   Transportation Needs: Not on file   Physical Activity: Not on file   Stress: Not on file   Social Connections: Not on file   Intimate Partner Violence: Not on file   Housing Stability: Not on file       Outpatient Encounter Medications as of 5/19/2022   Medication Sig Dispense Refill     amLODIPine (NORVASC) 5 MG tablet TAKE 1 TABLET BY MOUTH  DAILY 90 tablet 1     aspirin 325 MG EC tablet Take 1 tablet by mouth daily. 100 tablet 3     CENTRUM SILVER OR TABS 1 TABLET DAILY       furosemide (LASIX) 20 MG tablet TAKE 1 TABLET BY MOUTH  DAILY AS NEEDED FOR EDEMA 90 tablet 1     levothyroxine (SYNTHROID/LEVOTHROID) 137 MCG tablet TAKE 1 TABLET BY MOUTH  DAILY 90 tablet 3     simvastatin (ZOCOR) 20 MG tablet Take 1 tablet (20 mg) by mouth At Bedtime 90 tablet 3     timolol maleate (TIMOPTIC) 0.5 % ophthalmic solution        XALATAN 0.005 % OP SOLN qd       No facility-administered encounter medications on file as of 5/19/2022.             O:   NAD, WDWN, Alert & Oriented, Mood & Affect wnl, Vitals stable   Here today alone   BP (!) 150/72   Pulse 66   SpO2 96%    General appearance normal   Vitals stable   Alert, oriented and in no acute distress     L arm well healed  R post ear inflamed seborrheic keratosis    Stuck on papules and brown macules on trunk and ext   Red papules on trunk  Flesh colored papules on trunk         Eyes: Conjunctivae/lids:Normal     ENT: Lips, buccal mucosa, tongue: normal    MSK:Normal    Cardiovascular: peripheral edema none    Pulm: Breathing Normal    Neuro/Psych: Orientation:Alert and Orientedx3 ; Mood/Affect:normal       A/P:  1. Seborrheic keratosis, lentigo, angioma, dermal nevus, hx of non-melanoma skin cancer   2. R post ear inflamed seborrheic keratosis   LN2:  Treated with LN2 for 5s for 1-2 cycles. Warned risks of blistering, pain, pigment change, scarring, and  incomplete resolution.  Advised patient to return if lesions do not completely resolve.  Wound care sheet given.  It was a pleasure speaking to Abundio Beckett today.  Previous clinic notes and pertinent laboratory tests were reviewed prior to Abundio Beckett's visit.  Nature and genetics of benign skin lesions dicussed with patient.  Signs and Symptoms of skin cancer discussed with patient.  Patient encouraged to perform monthly skin exams.  UV precautions reviewed with patient.  Risks of non-melanoma skin cancer discussed with patient   Return to clinic 6 months        Again, thank you for allowing me to participate in the care of your patient.        Sincerely,        Dm Vargas MD

## 2022-05-19 NOTE — PATIENT INSTRUCTIONS
WOUND CARE INSTRUCTIONS  FOR CRYOSURGERY  For questions please call [phone]        This area treated with liquid nitrogen will form a blister. You do not need to bandage the area until after the blister forms and breaks (which may be a few days).  When the blister breaks, begin daily dressing changes as follows:    1) Clean and dry the area with tap water using clean Q-tip or sterile gauze pad.    2) Apply Vaseline or Aquaphor over entire wound. Other options include Polysporin ointment or Bacitracin ointment over entire wound.  Do NOT use Neosporin ointment.    3) Cover the wound with a band-aid or sterile non-stick gauze pad and micropore paper tape.      REPEAT THESE INSTRUCTIONS AT LEAST ONCE A DAY UNTIL THE WOUND HAS COMPLETELY HEALED.        It is an old wives tale that a wound heals better when it is exposed to air and allowed to dry out. The wound will heal faster with a better cosmetic result if it is kept moist with ointment and covered with a bandage.  Do not let the wound dry out.      Supplies Needed:     *Cotton tipped applicators (Q-tips)   *Polysporin ointment or Bacitracin ointment (NOT NEOSPORIN)   *Band-aids, or non stick gauze pads and micropore paper tape    PATIENT INFORMATION    During the healing process you will notice a number of changes. All wounds develop a small halo of redness surrounding the wound.  This means healing is occurring. Severe itching with extensive redness usually indicates sensitivity to the ointment or bandage tape used to dress the wound.  You should call our office if this develops.      Swelling and/or discoloration around your surgical site is common, particularly when performed around the eye.    All wounds normally drain.  The larger the wound the more drainage there will be.  After 7-10 days, you will notice the wound beginning to shrink and new skin will begin to grow.  The wound is healed when you can see skin has formed over the entire area.  A healed  wound has a healthy, shiny look to the surface and is red to dark pink in color to normalize.  Wounds may take approximately 4-6 weeks to heal.  Larger wounds may take 6-8 weeks.  After the wound is healed you may discontinue dressing changes.    You may experience a sensation of tightness as your wound heals. This is normal and will gradually subside.    Your healed wound may be sensitive to temperature changes. This sensitivity improves with time, but if you re having a lot of discomfort, try to avoid temperature extremes.    Patients frequently experience itching after their wound appears to have healed because of the continue healing under the skin.  Plain Vaseline will help relieve the itching.

## 2022-05-19 NOTE — PROGRESS NOTES
Abundio Beckett is an extremely pleasant 86 year old year old male patient here today for spot on right ear.   .   Patient states this has been present for a while.  Patient reports the following symptoms:  itching.  Patient reports the following previous treatments none.  These treatments did not work.  Patient reports the following modifying factors none.  Associated symptoms: none.  Patient has no other skin complaints today.  Remainder of the HPI, Meds, PMH, Allergies, FH, and SH was reviewed in chart.      Past Medical History:   Diagnosis Date     Basal cell carcinoma      BENIGN PROSTATIC HYPERTROPHY 9/17/2002     Essential hypertension, benign 11/8/2016     Hyperlipidemia LDL goal <100 5/29/2012     Hypertrophy (benign) of prostate     abstracted 7/5/02.     HYPOTHYROIDISM NOS 9/17/2002     Lacunar infarction (H) 9/4/2012     Unspecified hypothyroidism     abstracted 7/5/02.       Past Surgical History:   Procedure Laterality Date     HC SHOULDER ARTHROSCOPY, DX       ZZC REMV PROSTATE,PERINEAL,RADICAL  2000        History reviewed. No pertinent family history.    Social History     Socioeconomic History     Marital status:      Spouse name: Not on file     Number of children: Not on file     Years of education: Not on file     Highest education level: Not on file   Occupational History     Not on file   Tobacco Use     Smoking status: Never Smoker     Smokeless tobacco: Never Used   Substance and Sexual Activity     Alcohol use: Yes     Drug use: No     Sexual activity: Not Currently     Partners: Female   Other Topics Concern     Parent/sibling w/ CABG, MI or angioplasty before 65F 55M? Not Asked   Social History Narrative     Not on file     Social Determinants of Health     Financial Resource Strain: Not on file   Food Insecurity: Not on file   Transportation Needs: Not on file   Physical Activity: Not on file   Stress: Not on file   Social Connections: Not on file   Intimate Partner Violence:  Not on file   Housing Stability: Not on file       Outpatient Encounter Medications as of 5/19/2022   Medication Sig Dispense Refill     amLODIPine (NORVASC) 5 MG tablet TAKE 1 TABLET BY MOUTH  DAILY 90 tablet 1     aspirin 325 MG EC tablet Take 1 tablet by mouth daily. 100 tablet 3     CENTRUM SILVER OR TABS 1 TABLET DAILY       furosemide (LASIX) 20 MG tablet TAKE 1 TABLET BY MOUTH  DAILY AS NEEDED FOR EDEMA 90 tablet 1     levothyroxine (SYNTHROID/LEVOTHROID) 137 MCG tablet TAKE 1 TABLET BY MOUTH  DAILY 90 tablet 3     simvastatin (ZOCOR) 20 MG tablet Take 1 tablet (20 mg) by mouth At Bedtime 90 tablet 3     timolol maleate (TIMOPTIC) 0.5 % ophthalmic solution        XALATAN 0.005 % OP SOLN qd       No facility-administered encounter medications on file as of 5/19/2022.             O:   NAD, WDWN, Alert & Oriented, Mood & Affect wnl, Vitals stable   Here today alone   BP (!) 150/72   Pulse 66   SpO2 96%    General appearance normal   Vitals stable   Alert, oriented and in no acute distress     L arm well healed  R post ear inflamed seborrheic keratosis    Stuck on papules and brown macules on trunk and ext   Red papules on trunk  Flesh colored papules on trunk         Eyes: Conjunctivae/lids:Normal     ENT: Lips, buccal mucosa, tongue: normal    MSK:Normal    Cardiovascular: peripheral edema none    Pulm: Breathing Normal    Neuro/Psych: Orientation:Alert and Orientedx3 ; Mood/Affect:normal       A/P:  1. Seborrheic keratosis, lentigo, angioma, dermal nevus, hx of non-melanoma skin cancer   2. R post ear inflamed seborrheic keratosis   LN2:  Treated with LN2 for 5s for 1-2 cycles. Warned risks of blistering, pain, pigment change, scarring, and incomplete resolution.  Advised patient to return if lesions do not completely resolve.  Wound care sheet given.  It was a pleasure speaking to Abundio Beckett today.  Previous clinic notes and pertinent laboratory tests were reviewed prior to Abundio Beckett's  visit.  Nature and genetics of benign skin lesions dicussed with patient.  Signs and Symptoms of skin cancer discussed with patient.  Patient encouraged to perform monthly skin exams.  UV precautions reviewed with patient.  Risks of non-melanoma skin cancer discussed with patient   Return to clinic 6 months

## 2022-07-02 DIAGNOSIS — I10 ESSENTIAL HYPERTENSION, BENIGN: ICD-10-CM

## 2022-07-05 NOTE — TELEPHONE ENCOUNTER
Routing refill request to provider for review/approval because:  Labs out of range:    BP Readings from Last 3 Encounters:   05/19/22 (!) 150/72   01/13/22 136/72   01/11/22 122/68         JORDANA Miller  [facility]

## 2022-07-06 RX ORDER — AMLODIPINE BESYLATE 5 MG/1
TABLET ORAL
Qty: 100 TABLET | Refills: 2 | Status: SHIPPED | OUTPATIENT
Start: 2022-07-06 | End: 2022-10-19

## 2022-08-07 DIAGNOSIS — E03.9 HYPOTHYROIDISM, UNSPECIFIED TYPE: ICD-10-CM

## 2022-08-09 RX ORDER — LEVOTHYROXINE SODIUM 137 UG/1
TABLET ORAL
Qty: 90 TABLET | Refills: 0 | Status: SHIPPED | OUTPATIENT
Start: 2022-08-09 | End: 2022-10-19

## 2022-08-09 NOTE — TELEPHONE ENCOUNTER
Prescription approved per [facility] Refill Protocol.  Hollie Albarran, RN  [facility] Triage Nurse

## 2022-08-22 ENCOUNTER — OFFICE VISIT (OUTPATIENT)
Dept: DERMATOLOGY | Facility: CLINIC | Age: 87
End: 2022-08-22
Payer: COMMERCIAL

## 2022-08-22 DIAGNOSIS — L82.0 INFLAMED SEBORRHEIC KERATOSIS: Primary | ICD-10-CM

## 2022-08-22 DIAGNOSIS — L57.0 ACTINIC KERATOSIS: ICD-10-CM

## 2022-08-22 DIAGNOSIS — Z85.828 HISTORY OF BASAL CELL CARCINOMA: ICD-10-CM

## 2022-08-22 PROCEDURE — 17003 DESTRUCT PREMALG LES 2-14: CPT | Mod: XS | Performed by: STUDENT IN AN ORGANIZED HEALTH CARE EDUCATION/TRAINING PROGRAM

## 2022-08-22 PROCEDURE — 17000 DESTRUCT PREMALG LESION: CPT | Mod: XS | Performed by: STUDENT IN AN ORGANIZED HEALTH CARE EDUCATION/TRAINING PROGRAM

## 2022-08-22 PROCEDURE — 17110 DESTRUCTION B9 LES UP TO 14: CPT | Performed by: STUDENT IN AN ORGANIZED HEALTH CARE EDUCATION/TRAINING PROGRAM

## 2022-08-22 PROCEDURE — 99213 OFFICE O/P EST LOW 20 MIN: CPT | Mod: 25 | Performed by: STUDENT IN AN ORGANIZED HEALTH CARE EDUCATION/TRAINING PROGRAM

## 2022-08-22 NOTE — PROGRESS NOTES
[facility] Dermatology Note    Encounter Date: Aug 22, 2022    Dermatology Problem List:  - BCC L tricep s/p ED/C 10/26/2021      Major PMHx  -   ______________________________________    Impression/Plan:  Abundio was seen today for derm problem.    Diagnoses and all orders for this visit:    Inflamed seborrheic keratosis  -     DESTRUCT BENIGN LESION, UP TO 14  - LN2 x 14 on trunk and extremities     Actinic keratosis  -     DESTRUCT PREMALIGNANT LESION, FIRST  -     DESTRUCT PREMALIGNANT LESION, 2-14  - ln2 x 6 on face and scalp     History of basal cell carcinoma  - No obvious evidence of recurrence at site of previous malignancy    Actinic skin damage  - discussed sunprotective behaviors, clothing, and the use of sunscreen            Follow-up in  1 year  .   Cryotherapy procedure note: After verbal consent and discussion of risks and benefits including but no limited to dyspigmentation/scar, blister, and pain, 14 sks and 6 aks on trunk and face and scalp respectively was(were) treated with 1-2mm freeze border for 2 cycles with liquid nitrogen. Post cryotherapy instructions were provided.         Staff Involved:  Staff Only    MD Jah   of Dermatology  Department of Dermatology  [facility]      CC:   Chief Complaint   Patient presents with     Derm Problem     Cryo        History of Present Illness:  Mr. Abundio Beckett is a 86 year old male who presents as a return patient.    Hx of ISKs, has developed new one that itch     Labs:      Physical exam:  Vitals: There were no vitals taken for this visit.  GEN: well developed, well-nourished, in no acute distress, in a pleasant mood.     SKIN: Prieto phototype 1  - Waist-up skin, which includes the head/face, neck, both arms, chest, back, abdomen, digits and/or nails was examined  - Stuck on brown papules on trunk and extremities   - Flat brown males and patches in a sun exposed areas  on face and extremities  - gritty pink papules on face and scalp   - No other lesions of concern on areas examined.     Past Medical History:   Past Medical History:   Diagnosis Date     Basal cell carcinoma      BENIGN PROSTATIC HYPERTROPHY 9/17/2002     Essential hypertension, benign 11/8/2016     Hyperlipidemia LDL goal <100 5/29/2012     Hypertrophy (benign) of prostate     abstracted 7/5/02.     HYPOTHYROIDISM NOS 9/17/2002     Lacunar infarction (H) 9/4/2012     Unspecified hypothyroidism     abstracted 7/5/02.     Past Surgical History:   Procedure Laterality Date     HC SHOULDER ARTHROSCOPY, DX       ZZC REMV PROSTATE,PERINEAL,RADICAL  2000       Social History:   reports that he has never smoked. He has never used smokeless tobacco. He reports current alcohol use. He reports that he does not use drugs.    Family History:  History reviewed. No pertinent family history.    Medications:  Current Outpatient Medications   Medication Sig Dispense Refill     amLODIPine (NORVASC) 5 MG tablet TAKE 1 TABLET BY MOUTH  DAILY 100 tablet 2     aspirin 325 MG EC tablet Take 1 tablet by mouth daily. 100 tablet 3     CENTRUM SILVER OR TABS 1 TABLET DAILY       furosemide (LASIX) 20 MG tablet TAKE 1 TABLET BY MOUTH  DAILY AS NEEDED FOR EDEMA 90 tablet 1     levothyroxine (SYNTHROID/LEVOTHROID) 137 MCG tablet TAKE 1 TABLET BY MOUTH  DAILY 90 tablet 0     simvastatin (ZOCOR) 20 MG tablet Take 1 tablet (20 mg) by mouth At Bedtime 90 tablet 3     timolol maleate (TIMOPTIC) 0.5 % ophthalmic solution        XALATAN 0.005 % OP SOLN qd       Allergies   Allergen Reactions     No Known Drug Allergies

## 2022-08-22 NOTE — PATIENT INSTRUCTIONS
CRYOTHERAPY    What is it?  Use of a very cold liquid, such as liquid nitrogen, to freeze and destroy abnormal skin cells that need to be removed    What should I expect?  Tenderness and redness  A small blister that might grow and fill with dark purple blood.  More than one treatment may be needed if the lesions do not go away.    How do I care for the treated area?  Gently wash the area with your hands when bathing.  Use a thin layer of VaselineÆ to help with healing.   The area should heal within 7-10 days.  Do not use an antibiotic or NeosporinÆ ointment.   You may take acetaminophen (TylenolÆ) for pain.     Call your Doctor if you have:  Severe pain  Signs of infection (warmth, redness, cloudy yellow drainage, and or a bad smell)  Questions or concerns

## 2022-08-22 NOTE — LETTER
8/22/2022         RE: Abundio Beckett  [address]  [facility] [address]        Dear Colleague,    Thank you for referring your patient, Abundio Beckett, to the [facility]. Please see a copy of my visit note below.    [facility] Dermatology Note    Encounter Date: Aug 22, 2022    Dermatology Problem List:  - smBCC L tricep s/p ED/C 10/26/2021      Major PMHx  -   ______________________________________    Impression/Plan:  Abundio was seen today for derm problem.    Diagnoses and all orders for this visit:    Inflamed seborrheic keratosis  -     DESTRUCT BENIGN LESION, UP TO 14  - LN2 x 14 on trunk and extremities     Actinic keratosis  -     DESTRUCT PREMALIGNANT LESION, FIRST  -     DESTRUCT PREMALIGNANT LESION, 2-14  - ln2 x 6 on face and scalp     History of basal cell carcinoma  - No obvious evidence of recurrence at site of previous malignancy    Actinic skin damage  - discussed sunprotective behaviors, clothing, and the use of sunscreen            Follow-up in  1 year  .   Cryotherapy procedure note: After verbal consent and discussion of risks and benefits including but no limited to dyspigmentation/scar, blister, and pain, 14 sks and 6 aks on trunk and face and scalp respectively was(were) treated with 1-2mm freeze border for 2 cycles with liquid nitrogen. Post cryotherapy instructions were provided.         Staff Involved:  Staff Only    MD Jah   of Dermatology  Department of Dermatology  [facility] [employer] [facility]      CC:   Chief Complaint   Patient presents with     Derm Problem     Cryo        History of Present Illness:  Mr. Abundio Beckett is a 86 year old male who presents as a return patient.    Hx of ISKs, has developed new one that itch     Labs:      Physical exam:  Vitals: There were no vitals taken for this visit.  GEN: well developed, well-nourished, in no acute distress, in a  pleasant mood.     SKIN: Prieto phototype 1  - Waist-up skin, which includes the head/face, neck, both arms, chest, back, abdomen, digits and/or nails was examined  - Stuck on brown papules on trunk and extremities   - Flat brown males and patches in a sun exposed areas on face and extremities  - gritty pink papules on face and scalp   - No other lesions of concern on areas examined.     Past Medical History:   Past Medical History:   Diagnosis Date     Basal cell carcinoma      BENIGN PROSTATIC HYPERTROPHY 9/17/2002     Essential hypertension, benign 11/8/2016     Hyperlipidemia LDL goal <100 5/29/2012     Hypertrophy (benign) of prostate     abstracted 7/5/02.     HYPOTHYROIDISM NOS 9/17/2002     Lacunar infarction (H) 9/4/2012     Unspecified hypothyroidism     abstracted 7/5/02.     Past Surgical History:   Procedure Laterality Date     HC SHOULDER ARTHROSCOPY, DX       ZZC REMV PROSTATE,PERINEAL,RADICAL  2000       Social History:   reports that he has never smoked. He has never used smokeless tobacco. He reports current alcohol use. He reports that he does not use drugs.    Family History:  History reviewed. No pertinent family history.    Medications:  Current Outpatient Medications   Medication Sig Dispense Refill     amLODIPine (NORVASC) 5 MG tablet TAKE 1 TABLET BY MOUTH  DAILY 100 tablet 2     aspirin 325 MG EC tablet Take 1 tablet by mouth daily. 100 tablet 3     CENTRUM SILVER OR TABS 1 TABLET DAILY       furosemide (LASIX) 20 MG tablet TAKE 1 TABLET BY MOUTH  DAILY AS NEEDED FOR EDEMA 90 tablet 1     levothyroxine (SYNTHROID/LEVOTHROID) 137 MCG tablet TAKE 1 TABLET BY MOUTH  DAILY 90 tablet 0     simvastatin (ZOCOR) 20 MG tablet Take 1 tablet (20 mg) by mouth At Bedtime 90 tablet 3     timolol maleate (TIMOPTIC) 0.5 % ophthalmic solution        XALATAN 0.005 % OP SOLN qd       Allergies   Allergen Reactions     No Known Drug Allergies                    Again, thank you for allowing me to  participate in the care of your patient.        Sincerely,        Jah Peters MD

## 2022-09-21 ENCOUNTER — HOSPITAL ENCOUNTER (EMERGENCY)
Facility: CLINIC | Age: 87
Discharge: HOME | End: 2022-09-21
Payer: COMMERCIAL

## 2022-09-21 VITALS
DIASTOLIC BLOOD PRESSURE: 59 MMHG | RESPIRATION RATE: 14 BRPM | WEIGHT: 200 LBS | HEART RATE: 63 BPM | SYSTOLIC BLOOD PRESSURE: 127 MMHG | OXYGEN SATURATION: 96 % | TEMPERATURE: 97 F | HEIGHT: 70 IN | BODY MASS INDEX: 28.63 KG/M2

## 2022-09-21 NOTE — ED TRIAGE NOTES
Pt was eating an apple around 1000 today and he got some chunks of it stuck in his throat - he can swallow- but feels food still or something is still in there.   Since sitting in the chair pt now feels better .      Triage Assessment     Row Name 09/21/22 [id number]       Triage Assessment (Adult)    Airway WDL WDL       Respiratory WDL    Respiratory WDL WDL       Skin Circulation/Temperature WDL    Skin Circulation/Temperature WDL WDL       Cardiac WDL    Cardiac WDL WDL       Peripheral/Neurovascular WDL    Peripheral Neurovascular WDL WDL       Cognitive/Neuro/Behavioral WDL    Cognitive/Neuro/Behavioral WDL WDL

## 2022-10-10 ENCOUNTER — HEALTH MAINTENANCE LETTER (OUTPATIENT)
Age: 87
End: 2022-10-10

## 2022-10-10 ENCOUNTER — MYC MEDICAL ADVICE (OUTPATIENT)
Dept: INTERNAL MEDICINE | Facility: CLINIC | Age: 87
End: 2022-10-10

## 2022-10-12 DIAGNOSIS — E78.5 HYPERLIPIDEMIA LDL GOAL <100: ICD-10-CM

## 2022-10-12 DIAGNOSIS — I63.81 LACUNAR INFARCTION (H): ICD-10-CM

## 2022-10-13 RX ORDER — SIMVASTATIN 20 MG
TABLET ORAL
Qty: 90 TABLET | Refills: 0 | Status: SHIPPED | OUTPATIENT
Start: 2022-10-13 | End: 2022-10-19

## 2022-10-19 ENCOUNTER — OFFICE VISIT (OUTPATIENT)
Dept: INTERNAL MEDICINE | Facility: CLINIC | Age: 87
End: 2022-10-19
Payer: COMMERCIAL

## 2022-10-19 VITALS
TEMPERATURE: 97.3 F | BODY MASS INDEX: 28.72 KG/M2 | DIASTOLIC BLOOD PRESSURE: 64 MMHG | OXYGEN SATURATION: 99 % | WEIGHT: 200.6 LBS | SYSTOLIC BLOOD PRESSURE: 108 MMHG | HEART RATE: 59 BPM | HEIGHT: 70 IN

## 2022-10-19 DIAGNOSIS — E03.9 HYPOTHYROIDISM, UNSPECIFIED TYPE: ICD-10-CM

## 2022-10-19 DIAGNOSIS — Z01.818 PREOP GENERAL PHYSICAL EXAM: Primary | ICD-10-CM

## 2022-10-19 DIAGNOSIS — I10 ESSENTIAL HYPERTENSION, BENIGN: ICD-10-CM

## 2022-10-19 DIAGNOSIS — E78.5 HYPERLIPIDEMIA LDL GOAL <100: ICD-10-CM

## 2022-10-19 DIAGNOSIS — I63.81 LACUNAR INFARCTION (H): ICD-10-CM

## 2022-10-19 DIAGNOSIS — R60.9 EDEMA, UNSPECIFIED TYPE: ICD-10-CM

## 2022-10-19 DIAGNOSIS — R13.19 ESOPHAGEAL DYSPHAGIA: ICD-10-CM

## 2022-10-19 LAB
ALBUMIN SERPL-MCNC: 3.7 G/DL (ref 3.4–5)
ALP SERPL-CCNC: 71 U/L (ref 40–150)
ALT SERPL W P-5'-P-CCNC: 15 U/L (ref 0–70)
ANION GAP SERPL CALCULATED.3IONS-SCNC: 5 MMOL/L (ref 3–14)
AST SERPL W P-5'-P-CCNC: 12 U/L (ref 0–45)
BILIRUB SERPL-MCNC: 0.7 MG/DL (ref 0.2–1.3)
BUN SERPL-MCNC: 13 MG/DL (ref 7–30)
CALCIUM SERPL-MCNC: 9.1 MG/DL (ref 8.5–10.1)
CHLORIDE BLD-SCNC: 103 MMOL/L (ref 94–109)
CHOLEST SERPL-MCNC: 122 MG/DL
CO2 SERPL-SCNC: 30 MMOL/L (ref 20–32)
CREAT SERPL-MCNC: 1.03 MG/DL (ref 0.66–1.25)
ERYTHROCYTE [DISTWIDTH] IN BLOOD BY AUTOMATED COUNT: 11.9 % (ref 10–15)
FASTING STATUS PATIENT QL REPORTED: NO
GFR SERPL CREATININE-BSD FRML MDRD: 71 ML/MIN/1.73M2
GLUCOSE BLD-MCNC: 100 MG/DL (ref 70–99)
HCT VFR BLD AUTO: 42.3 % (ref 40–53)
HDLC SERPL-MCNC: 49 MG/DL
HGB BLD-MCNC: 14.2 G/DL (ref 13.3–17.7)
LDLC SERPL CALC-MCNC: 45 MG/DL
MCH RBC QN AUTO: 32.5 PG (ref 26.5–33)
MCHC RBC AUTO-ENTMCNC: 33.6 G/DL (ref 31.5–36.5)
MCV RBC AUTO: 97 FL (ref 78–100)
NONHDLC SERPL-MCNC: 73 MG/DL
PLATELET # BLD AUTO: 160 10E3/UL (ref 150–450)
POTASSIUM BLD-SCNC: 4.2 MMOL/L (ref 3.4–5.3)
PROT SERPL-MCNC: 7.5 G/DL (ref 6.8–8.8)
RBC # BLD AUTO: 4.37 10E6/UL (ref 4.4–5.9)
SODIUM SERPL-SCNC: 138 MMOL/L (ref 133–144)
TRIGL SERPL-MCNC: 141 MG/DL
TSH SERPL DL<=0.005 MIU/L-ACNC: 3.37 MU/L (ref 0.4–4)
WBC # BLD AUTO: 5.9 10E3/UL (ref 4–11)

## 2022-10-19 PROCEDURE — 80053 COMPREHEN METABOLIC PANEL: CPT | Performed by: INTERNAL MEDICINE

## 2022-10-19 PROCEDURE — 84443 ASSAY THYROID STIM HORMONE: CPT | Performed by: INTERNAL MEDICINE

## 2022-10-19 PROCEDURE — 85027 COMPLETE CBC AUTOMATED: CPT | Performed by: INTERNAL MEDICINE

## 2022-10-19 PROCEDURE — 93000 ELECTROCARDIOGRAM COMPLETE: CPT | Performed by: INTERNAL MEDICINE

## 2022-10-19 PROCEDURE — 80061 LIPID PANEL: CPT | Performed by: INTERNAL MEDICINE

## 2022-10-19 PROCEDURE — 99214 OFFICE O/P EST MOD 30 MIN: CPT | Performed by: INTERNAL MEDICINE

## 2022-10-19 PROCEDURE — 36415 COLL VENOUS BLD VENIPUNCTURE: CPT | Performed by: INTERNAL MEDICINE

## 2022-10-19 RX ORDER — LEVOTHYROXINE SODIUM 137 UG/1
137 TABLET ORAL DAILY
Qty: 90 TABLET | Refills: 1 | Status: SHIPPED | OUTPATIENT
Start: 2022-10-19 | End: 2023-01-12

## 2022-10-19 RX ORDER — AMLODIPINE BESYLATE 5 MG/1
5 TABLET ORAL DAILY
Qty: 90 TABLET | Refills: 1 | Status: SHIPPED | OUTPATIENT
Start: 2022-10-19 | End: 2022-12-26

## 2022-10-19 RX ORDER — FUROSEMIDE 20 MG
TABLET ORAL
Qty: 90 TABLET | Refills: 1 | Status: SHIPPED | OUTPATIENT
Start: 2022-10-19 | End: 2022-12-28

## 2022-10-19 RX ORDER — SIMVASTATIN 20 MG
20 TABLET ORAL AT BEDTIME
Qty: 90 TABLET | Refills: 1 | Status: SHIPPED | OUTPATIENT
Start: 2022-10-19 | End: 2023-01-16

## 2022-10-19 ASSESSMENT — PATIENT HEALTH QUESTIONNAIRE - PHQ9
SUM OF ALL RESPONSES TO PHQ QUESTIONS 1-9: 5
10. IF YOU CHECKED OFF ANY PROBLEMS, HOW DIFFICULT HAVE THESE PROBLEMS MADE IT FOR YOU TO DO YOUR WORK, TAKE CARE OF THINGS AT HOME, OR GET ALONG WITH OTHER PEOPLE: NOT DIFFICULT AT ALL
SUM OF ALL RESPONSES TO PHQ QUESTIONS 1-9: 5

## 2022-10-19 ASSESSMENT — PAIN SCALES - GENERAL: PAINLEVEL: MILD PAIN (3)

## 2022-10-19 NOTE — PROGRESS NOTES
[address] [address]  Phone: [phone]  Primary Provider: Colton Chris  Pre-op Performing Provider: HEATHER ORTEZ      PREOPERATIVE EVALUATION:  Today's date: 10/19/2022    Abundio Beckett is a 86 year old male who presents for a preoperative evaluation.    Surgical Information:  Surgery/Procedure: rigid esophagoscopy with cricopharyngeal Botox injection, flexible esophagoscopy with dilation  Surgery Location: [facility]  Surgeon: Torres Sahu  Surgery Date: 10-25-22  Time of Surgery: 3:20 PM  Where patient plans to recover: At home with family   Fax number for surgical facility: [facility] [phone]  ENT clinic fax is [phone]      Type of Anesthesia Anticipated: General    Assessment & Plan     The proposed surgical procedure is considered LOW risk.    1. Preop general physical exam    2. Esophageal dysphagia    3. Essential hypertension, benign    4. Edema, unspecified type    5. Hypothyroidism, unspecified type    6. Lacunar infarction (H)    7. Hyperlipidemia LDL goal <100              Risks and Recommendations:  The patient has the following additional risks and recommendations for perioperative complications:  Cardiovascular:  --cardiac status is stable.      Medication Instructions:  Patient is to take all scheduled medications on the day of surgery EXCEPT for modifications listed below:   - aspirin: Discontinue aspirin 7-10 days prior to procedure to reduce bleeding risk. It should be resumed postoperatively.    - Diuretics: HOLD on the day of surgery.    No ASA or NSAIDs within 7 days of surgery.  No fish oil or Vitamin E within 7 days of surgery.         RECOMMENDATION:  APPROVAL GIVEN to proceed with proposed procedure, without further diagnostic evaluation.                      Subjective     HPI related to upcoming procedure: Esophageal dysphagia.    Preop Questions 10/19/2022   1. Have you  ever had a heart attack or stroke? No   2. Have you ever had surgery on your heart or blood vessels, such as a stent placement, a coronary artery bypass, or surgery on an artery in your head, neck, heart, or legs? No   3. Do you have chest pain with activity? No   4. Do you have a history of  heart failure? No   5. Do you currently have a cold, bronchitis or symptoms of other infection? YES -recent mild upper respiratory infection last week, currently symptoms almost completely resolved.     6. Do you have a cough, shortness of breath, or wheezing? YES -recent mild upper respiratory infection last week, currently symptoms almost completely resolved.   7. Do you or anyone in your family have previous history of blood clots? No   8. Do you or does anyone in your family have a serious bleeding problem such as prolonged bleeding following surgeries or cuts? No   9. Have you ever had problems with anemia or been told to take iron pills? No   10. Have you had any abnormal blood loss such as black, tarry or bloody stools? No   11. Have you ever had a blood transfusion? No   12. Are you willing to have a blood transfusion if it is medically needed before, during, or after your surgery? Yes   13. Have you or any of your relatives ever had problems with anesthesia? No   14. Do you have sleep apnea, excessive snoring or daytime drowsiness? No   15. Do you have any artifical heart valves or other implanted medical devices like a pacemaker, defibrillator, or continuous glucose monitor? No   16. Do you have artificial joints? No   17. Are you allergic to latex? No       Health Care Directive:  Patient has a Health Care Directive on file      Preoperative Review of :   reviewed - no record of controlled substances prescribed.        *  recent mild upper respiratory infection last week, currently symptoms almost completely resolved.    *  No recent cardiac or pulmonary issues or symptoms.    *  No problems performing vigorous  physical activity, no changes in exercise tolerance.    *  No personal or family history of anesthesia complications.    *  No personal or family history of bleeding or clotting disorders.       Hypertension:  History of hypertension, on medication.  No reported side effects from medications.    Reviewed last 6 BP readings in chart:  BP Readings from Last 6 Encounters:   10/19/22 108/64   05/19/22 (!) 150/72   01/13/22 136/72   01/11/22 122/68   10/26/21 126/62   09/23/21 138/62     No active cardiac complaints or symptoms with exercise.     Review of Systems  Constitutional, neuro, ENT, endocrine, pulmonary, cardiac, gastrointestinal, genitourinary, musculoskeletal, integument and psychiatric systems are negative, except as otherwise noted.    Patient Active Problem List    Diagnosis Date Noted     History of prostate cancer 02/05/2020     Priority: Medium     Essential hypertension, benign 11/08/2016     Priority: Medium     Hypothyroidism, unspecified type 07/25/2016     Priority: Medium     Lacunar infarction (H) 09/04/2012     Priority: Medium     Hyperglycemia 05/29/2012     Priority: Medium     Advance Care Planning 08/22/2011     Priority: Medium     Advance Care Planning 10/3/2017: Receipt of ACP document:  Received: Health Care Directive which was witnessed or notarized on 9/21/17.  Document not previously scanned.  Reviewed for validity as medical order and sent to be scanned.  Code Status needs to be updated to reflect choices in most recent ACP document..  Confirmed/documented designated decision maker(s).  Added by Nataly Uribe      Advance Care Planning 11/28/2016: Receipt of ACP document:  Received: Health Care Directive which was witnessed or notarized on 5/19/08.  Document previously scanned on 9/26/16.  Validation form completed and sent to be scanned.  Code Status needs to be updated to reflect choices in most recent ACP document. Confirmed/documented designated decision maker(s).  Added by  Marjorie Atkins RN, Advance Care Planning Liaison.  Advance Care Planning 8/22/2011: Patient states has Advance Directive and will bring in a copy to clinic.          Allergic rhinitis 05/22/2006     Priority: Medium     Problem list name updated by automated process. Provider to review       Hypertrophy (benign) of prostate 09/17/2002     Priority: Medium     Problem list name updated by automated process. Provider to review and confirm        Past Medical History:   Diagnosis Date     Basal cell carcinoma      BENIGN PROSTATIC HYPERTROPHY 9/17/2002     Essential hypertension, benign 11/8/2016     Hyperlipidemia LDL goal <100 5/29/2012     Hypertrophy (benign) of prostate     abstracted 7/5/02.     HYPOTHYROIDISM NOS 9/17/2002     Lacunar infarction (H) 9/4/2012     Unspecified hypothyroidism     abstracted 7/5/02.     Past Surgical History:   Procedure Laterality Date     HC SHOULDER ARTHROSCOPY, DX       ZZC REMV PROSTATE,PERINEAL,RADICAL  2000     Current Outpatient Medications   Medication Sig Dispense Refill     amLODIPine (NORVASC) 5 MG tablet Take 1 tablet (5 mg) by mouth daily 90 tablet 1     CENTRUM SILVER OR TABS 1 TABLET DAILY       furosemide (LASIX) 20 MG tablet TAKE 1 TABLET BY MOUTH  DAILY AS NEEDED FOR EDEMA 90 tablet 1     levothyroxine (SYNTHROID/LEVOTHROID) 137 MCG tablet Take 1 tablet (137 mcg) by mouth daily 90 tablet 1     simvastatin (ZOCOR) 20 MG tablet Take 1 tablet (20 mg) by mouth At Bedtime 90 tablet 1     timolol maleate (TIMOPTIC) 0.5 % ophthalmic solution        XALATAN 0.005 % OP SOLN qd       aspirin 325 MG EC tablet Take 1 tablet by mouth daily. (Patient not taking: Reported on 10/19/2022) 100 tablet 3       Allergies   Allergen Reactions     No Known Drug Allergies         Social History     Tobacco Use     Smoking status: Never     Smokeless tobacco: Never   Substance Use Topics     Alcohol use: Yes     No family history on file.  History   Drug Use No         Objective     BP  "108/64   Pulse 59   Temp 97.3  F (36.3  C)   Ht 1.778 m (5' 10\")   Wt 91 kg (200 lb 9.6 oz)   SpO2 99%   BMI 28.78 kg/m      Physical Exam  GENERAL alert and no distress  EYES:  Normal sclera,conjunctiva, EOMI  HENT: oral and posterior pharynx without lesions or erythema, facies symmetric  NECK: Neck supple. No LAD, without thyroidmegaly.  RESP: Clear to ausculation bilaterally without wheezes or crackles. Normal BS in all fields.  CV: RRR normal S1S2 without murmurs, rubs or gallops.  LYMPH: no cervical lymph adenopathy appreciated  MS: extremities- no gross deformities of the visible extremities noted,   EXT:  no lower extremity edema  PSYCH: Alert and oriented times 3; speech- coherent  SKIN:  No obvious significant skin lesions on visible portions of face     Recent Labs   Lab Test 01/07/22  0920 12/04/20  1330   HGB 14.2 14.4   *  --     140   POTASSIUM 3.9 4.0   CR 1.12 1.05        Results for orders placed or performed in visit on 10/19/22   CBC with platelets     Status: Abnormal   Result Value Ref Range    WBC Count 5.9 4.0 - 11.0 10e3/uL    RBC Count 4.37 (L) 4.40 - 5.90 10e6/uL    Hemoglobin 14.2 13.3 - 17.7 g/dL    Hematocrit 42.3 40.0 - 53.0 %    MCV 97 78 - 100 fL    MCH 32.5 26.5 - 33.0 pg    MCHC 33.6 31.5 - 36.5 g/dL    RDW 11.9 10.0 - 15.0 %    Platelet Count 160 150 - 450 10e3/uL   Comprehensive metabolic panel     Status: Abnormal   Result Value Ref Range    Sodium 138 133 - 144 mmol/L    Potassium 4.2 3.4 - 5.3 mmol/L    Chloride 103 94 - 109 mmol/L    Carbon Dioxide (CO2) 30 20 - 32 mmol/L    Anion Gap 5 3 - 14 mmol/L    Urea Nitrogen 13 7 - 30 mg/dL    Creatinine 1.03 0.66 - 1.25 mg/dL    Calcium 9.1 8.5 - 10.1 mg/dL    Glucose 100 (H) 70 - 99 mg/dL    Alkaline Phosphatase 71 40 - 150 U/L    AST 12 0 - 45 U/L    ALT 15 0 - 70 U/L    Protein Total 7.5 6.8 - 8.8 g/dL    Albumin 3.7 3.4 - 5.0 g/dL    Bilirubin Total 0.7 0.2 - 1.3 mg/dL    GFR Estimate 71 >60 mL/min/1.73m2   TSH " with free T4 reflex     Status: Normal   Result Value Ref Range    TSH 3.37 0.40 - 4.00 mU/L   Lipid panel reflex to direct LDL Fasting     Status: None   Result Value Ref Range    Cholesterol 122 <200 mg/dL    Triglycerides 141 <150 mg/dL    Direct Measure HDL 49 >=40 mg/dL    LDL Cholesterol Calculated 45 <=100 mg/dL    Non HDL Cholesterol 73 <130 mg/dL    Patient Fasting > 8hrs? No     Narrative    Cholesterol  Desirable:  <200 mg/dL    Triglycerides  Normal:  Less than 150 mg/dL  Borderline High:  150-199 mg/dL  High:  200-499 mg/dL  Very High:  Greater than or equal to 500 mg/dL    Direct Measure HDL  Female:  Greater than or equal to 50 mg/dL   Male:  Greater than or equal to 40 mg/dL    LDL Cholesterol  Desirable:  <100mg/dL  Above Desirable:  100-129 mg/dL   Borderline High:  130-159 mg/dL   High:  160-189 mg/dL   Very High:  >= 190 mg/dL    Non HDL Cholesterol  Desirable:  130 mg/dL  Above Desirable:  130-159 mg/dL  Borderline High:  160-189 mg/dL  High:  190-219 mg/dL  Very High:  Greater than or equal to 220 mg/dL        Diagnostics:  Labs pending at this time.  Results will be reviewed when available.     EKG (10/19/2022): appears normal, NSR, normal axis, normal intervals, no acute ST/T changes c/w ischemia, no LVH by voltage criteria, unchanged from previous tracings    Revised Cardiac Risk Index (RCRI):  The patient has the following serious cardiovascular risks for perioperative complications:   - No serious cardiac risks = 0 points     RCRI Interpretation: 0 points: Class I (very low risk - 0.4% complication rate)           Signed Electronically by: Neto Major MD  Copy of this evaluation report is provided to requesting physician.

## 2022-10-24 ENCOUNTER — LAB (OUTPATIENT)
Dept: URGENT CARE | Facility: URGENT CARE | Age: 87
End: 2022-10-24
Payer: COMMERCIAL

## 2022-10-24 DIAGNOSIS — Z20.822 ENCOUNTER FOR LABORATORY TESTING FOR COVID-19 VIRUS: ICD-10-CM

## 2022-10-24 LAB — SARS-COV-2 RNA RESP QL NAA+PROBE: NEGATIVE

## 2022-10-24 PROCEDURE — U0005 INFEC AGEN DETEC AMPLI PROBE: HCPCS

## 2022-10-24 PROCEDURE — U0003 INFECTIOUS AGENT DETECTION BY NUCLEIC ACID (DNA OR RNA); SEVERE ACUTE RESPIRATORY SYNDROME CORONAVIRUS 2 (SARS-COV-2) (CORONAVIRUS DISEASE [COVID-19]), AMPLIFIED PROBE TECHNIQUE, MAKING USE OF HIGH THROUGHPUT TECHNOLOGIES AS DESCRIBED BY CMS-2020-01-R: HCPCS

## 2022-12-23 DIAGNOSIS — I10 ESSENTIAL HYPERTENSION, BENIGN: ICD-10-CM

## 2022-12-26 RX ORDER — AMLODIPINE BESYLATE 5 MG/1
TABLET ORAL
Qty: 90 TABLET | Refills: 0 | Status: SHIPPED | OUTPATIENT
Start: 2022-12-26 | End: 2023-03-14

## 2022-12-27 DIAGNOSIS — R60.9 EDEMA, UNSPECIFIED TYPE: ICD-10-CM

## 2022-12-27 DIAGNOSIS — I10 ESSENTIAL HYPERTENSION, BENIGN: ICD-10-CM

## 2022-12-28 RX ORDER — FUROSEMIDE 20 MG
TABLET ORAL
Qty: 100 TABLET | Refills: 2 | Status: SHIPPED | OUTPATIENT
Start: 2022-12-28 | End: 2024-04-29

## 2022-12-28 NOTE — TELEPHONE ENCOUNTER
Prescription approved per [facility] [facility].  Joan Cole RN  [facility]

## 2023-01-15 DIAGNOSIS — I63.81 LACUNAR INFARCTION (H): ICD-10-CM

## 2023-01-15 DIAGNOSIS — E78.5 HYPERLIPIDEMIA LDL GOAL <100: ICD-10-CM

## 2023-01-16 RX ORDER — SIMVASTATIN 20 MG
TABLET ORAL
Qty: 90 TABLET | Refills: 1 | Status: SHIPPED | OUTPATIENT
Start: 2023-01-16 | End: 2023-04-14

## 2023-02-02 NOTE — PROGRESS NOTES
Subjective     Abundio Beckett is a 85 year old male who presents to clinic today for the following health issues:    HPI         Hypertension Follow-up      Do you check your blood pressure regularly outside of the clinic? Yes     Are you following a low salt diet? Yes    Are your blood pressures ever more than 140 on the top number (systolic) OR more   than 90 on the bottom number (diastolic), for example 140/90? Yes- readings 160-130/90-80's        Other concerns:   1. Patient states he had another possible TIA that occurred the end of November.    Review of Systems   CONSTITUTIONAL: NEGATIVE for fever, chills, change in weight  EYES: NEGATIVE for vision changes or irritation  ENT/MOUTH: NEGATIVE for ear, mouth and throat problems  RESP: NEGATIVE for significant cough or SOB  CV: NEGATIVE for chest pain, palpitations or peripheral edema  GI: NEGATIVE for nausea, abdominal pain, heartburn, or change in bowel habits  : NEGATIVE for frequency, dysuria, or hematuria  MUSCULOSKELETAL: NEGATIVE for significant arthralgias or myalgia  NEURO: NEGATIVE for weakness, dizziness or paresthesias  HEME: NEGATIVE for bleeding problems  PSYCHIATRIC: NEGATIVE for changes in mood or affect      Objective    /80   Pulse 62   Temp 97.7  F (36.5  C) (Temporal)   Resp 15   Wt 93.9 kg (207 lb 1.6 oz)   SpO2 99%   BMI 29.72 kg/m    There is no height or weight on file to calculate BMI.  Physical Exam   GENERAL:alert and no distress  EYES: Eyes grossly normal to inspection, PERRL and conjunctivae and sclerae normal  HENT: ear canals and TM's normal, nose and mouth without ulcers or lesions  NECK: no adenopathy, no asymmetry, masses, or scars and thyroid normal to palpation  RESP: lungs clear to auscultation - no rales, rhonchi or wheezes  CV: regular rate and rhythm, normal S1 S2, no S3 or S4, no click or rub, no peripheral edema and peripheral pulses strong  MS: no gross musculoskeletal defects noted, no edema  NEURO:  alert , in no acute distress , well developed, well nourished Normal strength and tone, mentation intact and speech normal  PSYCH: mentation appears normal, affect normal/bright        Assessment & Plan     Essential hypertension, benign  He has increasing amlodipine to 1 full 5 mg tablet daily to try to maximize blood pressure control.  Suggest blood pressure check in 4 to 6 weeks.  - amLODIPine (NORVASC) 5 MG tablet; Take 1 tablet (5 mg) by mouth daily    TIA (transient ischemic attack)  Patient has had several episodes of these nonspecific spells that lasts less than 5 minutes and resolved.  I have talked to him several times about getting this further evaluated.  Evaluation would include further imaging studies with potential neurology consultation as well as a discussion is whether not he needs to be on a combination anticoagulation treatment plan.  He has refused these in the past and again today is not interested in such.  I expressed to him that these episodes although very brief and intermittent could be a sign of an upcoming event that may be significant.  He does not want to pursue any further evaluation at this point but I finally convinced him to at least take a telephone number to his neurology clinic so he can call and try to make an appointment.  He took the number is in agreement to potentially make a follow-up appointment.  Carriage ongoing compliance with full dose aspirin.  - NEUROLOGY ADULT REFERRAL        See Patient Instructions    Return for if symptoms recur or worsen, follow-up Neurology for further assessment.    Colton Chris MD  [facility]

## 2023-02-13 ENCOUNTER — OFFICE VISIT (OUTPATIENT)
Dept: INTERNAL MEDICINE | Facility: CLINIC | Age: 88
End: 2023-02-13
Payer: COMMERCIAL

## 2023-02-13 VITALS
HEIGHT: 70 IN | DIASTOLIC BLOOD PRESSURE: 72 MMHG | WEIGHT: 202.2 LBS | OXYGEN SATURATION: 98 % | TEMPERATURE: 98 F | SYSTOLIC BLOOD PRESSURE: 132 MMHG | BODY MASS INDEX: 28.95 KG/M2 | RESPIRATION RATE: 14 BRPM | HEART RATE: 59 BPM

## 2023-02-13 DIAGNOSIS — E03.9 HYPOTHYROIDISM, UNSPECIFIED TYPE: ICD-10-CM

## 2023-02-13 DIAGNOSIS — Z00.00 ENCOUNTER FOR SUBSEQUENT ANNUAL WELLNESS VISIT IN MEDICARE PATIENT: Primary | ICD-10-CM

## 2023-02-13 DIAGNOSIS — I63.81 LACUNAR INFARCTION (H): ICD-10-CM

## 2023-02-13 DIAGNOSIS — I10 ESSENTIAL HYPERTENSION, BENIGN: ICD-10-CM

## 2023-02-13 DIAGNOSIS — Z85.46 HISTORY OF PROSTATE CANCER: ICD-10-CM

## 2023-02-13 PROCEDURE — G0439 PPPS, SUBSEQ VISIT: HCPCS | Performed by: INTERNAL MEDICINE

## 2023-02-13 RX ORDER — ASPIRIN 325 MG
325 TABLET, DELAYED RELEASE (ENTERIC COATED) ORAL DAILY
Qty: 100 TABLET | Refills: 3
Start: 2023-02-13

## 2023-02-13 ASSESSMENT — ENCOUNTER SYMPTOMS
DYSURIA: 0
FEVER: 0
ARTHRALGIAS: 0
NAUSEA: 0
JOINT SWELLING: 0
ABDOMINAL PAIN: 0
COUGH: 1
SORE THROAT: 0
HEMATOCHEZIA: 0
DIARRHEA: 0
MYALGIAS: 0
PARESTHESIAS: 0
HEMATURIA: 0
FREQUENCY: 0
PALPITATIONS: 0
WEAKNESS: 0
COUGH: 0
CHILLS: 0
EYE PAIN: 0
SHORTNESS OF BREATH: 0
DIZZINESS: 1
NERVOUS/ANXIOUS: 0
CONSTIPATION: 0
HEADACHES: 0
HEARTBURN: 0

## 2023-02-13 ASSESSMENT — ACTIVITIES OF DAILY LIVING (ADL): CURRENT_FUNCTION: NO ASSISTANCE NEEDED

## 2023-02-13 NOTE — PROGRESS NOTES
"SUBJECTIVE:   Abundio is a 87 year old who presents for Preventive Visit.  Patient has been advised of split billing requirements and indicates understanding: Yes  Are you in the first 12 months of your Medicare coverage?  No    Healthy Habits:     In general, how would you rate your overall health?  Good    Frequency of exercise:  4-5 days/week    Duration of exercise:  15-30 minutes    Do you usually eat at least 4 servings of fruit and vegetables a day, include whole grains    & fiber and avoid regularly eating high fat or \"junk\" foods?  No    Taking medications regularly:  Yes    Medication side effects:  None    Ability to successfully perform activities of daily living:  No assistance needed    Home Safety:  No safety concerns identified    Hearing Impairment:  No hearing concerns    In the past 6 months, have you been bothered by leaking of urine? Yes    In general, how would you rate your overall mental or emotional health?  Good      PHQ-2 Total Score: 0    Additional concerns today:  No      Have you ever done Advance Care Planning? (For example, a Health Directive, POLST, or a discussion with a medical provider or your loved ones about your wishes): Yes, advance care planning is on file.       Fall risk:  low    Cognitive Screening   1) Repeat 3 items (Leader, Season, Table)    2) Clock draw: ABNORMAL numbers  3) 3 item recall: Recalls 1 object   Results: ABNORMAL clock, 1-2 items recalled: PROBABLE COGNITIVE IMPAIRMENT, **INFORM PROVIDER**    Mini-CogTM Copyright BRANDON Shelby. Licensed by the author for use in [facility]; reprinted with permission (trudi@.[facility]). All rights reserved.        Reviewed and updated as needed this visit by clinical staff                  Reviewed and updated as needed this visit by Provider                 Social History     Tobacco Use     Smoking status: Never     Smokeless tobacco: Never   Substance Use Topics     Alcohol use: Yes     If you drink alcohol do you " typically have >3 drinks per day or >7 drinks per week? No    Alcohol Use 2/13/2023   Prescreen: >3 drinks/day or >7 drinks/week? No   Prescreen: >3 drinks/day or >7 drinks/week? -       PROBLEMS TO ADD ON:    1. Worsening balance.  Does do an exercise class with balance issues.  Using cane as needed.    Current providers sharing in care for this patient include:   Patient Care Team:  Colton Chris MD as PCP - General  Colton Chris MD as Assigned PCP  Sandee Butt PA-C as Physician Assistant (Dermatology)  Amrit Tilley DO as Assigned Musculoskeletal Provider  Dm Vargas MD as Assigned Surgical Provider  Jah Peters MD as MD (Dermatology)    The following health maintenance items are reviewed in Epic and correct as of today:  Health Maintenance   Topic Date Due     DTAP/TDAP/TD IMMUNIZATION (3 - Td or Tdap) 08/26/2018     MEDICARE ANNUAL WELLNESS VISIT  01/11/2023     ANNUAL REVIEW OF HM ORDERS  01/11/2023     FALL RISK ASSESSMENT  01/11/2023     PHQ-9  04/19/2023     TSH W/FREE T4 REFLEX  10/19/2023     ADVANCE CARE PLANNING  01/11/2027     DEPRESSION ACTION PLAN  Completed     INFLUENZA VACCINE  Completed     Pneumococcal Vaccine: 65+ Years  Completed     ZOSTER IMMUNIZATION  Completed     COVID-19 Vaccine  Completed     IPV IMMUNIZATION  Aged Out     MENINGITIS IMMUNIZATION  Aged Out       Immunization History   Administered Date(s) Administered     COVID-19 Vaccine 12+ (Pfizer 2022) 04/19/2022     COVID-19 Vaccine 12+ (Pfizer) 01/31/2021, 02/21/2021, 10/05/2021     COVID-19 Vaccine Bivalent Booster 12+ (Pfizer) 10/21/2022     HEPA 06/05/2000, 06/11/2002     HepB-Adult 06/13/2002, 07/10/2002     Influenza (H1N1) 12/21/2009     Influenza (High Dose) 3 valent vaccine 10/01/2015, 09/07/2016, 09/14/2017, 09/18/2018, 10/15/2020, 10/05/2021, 09/28/2022     Influenza (IIV3) PF 11/22/2000, 09/25/2010, 09/03/2011     Influenza Vaccine 50-64 or 18-64 w/egg allergy (Flublok) 10/01/2019  "    Influenza Vaccine 65+ (Fluzone HD) 09/30/2021, 09/20/2022     LYMERIX 05/12/1999, 06/24/1999, 06/02/2000     Pneumo Conj 13-V (2010&after) 06/02/2016     Pneumococcal 23 valent 10/11/1999, 05/22/2006     Poliovirus, inactivated (IPV) 06/05/2000     TD (ADULT, 7+) 01/13/1997, 08/26/2008     Typhoid IM 06/13/2002     Yellow Fever 06/13/2002     Zoster vaccine recombinant adjuvanted (SHINGRIX) 09/30/2019, 12/12/2019     Zoster vaccine, live 07/16/2009       Review of Systems   Constitutional: Negative for chills and fever.   HENT: Negative for congestion, ear pain, hearing loss and sore throat.    Eyes: Negative for pain and visual disturbance.   Respiratory: Negative for cough and shortness of breath.    Cardiovascular: Negative for chest pain, palpitations and peripheral edema.   Gastrointestinal: Negative for abdominal pain, constipation, diarrhea, heartburn, hematochezia and nausea.   Genitourinary: Positive for impotence. Negative for dysuria, frequency, genital sores, hematuria, penile discharge and urgency.   Musculoskeletal: Negative for arthralgias, joint swelling and myalgias.   Skin: Negative for rash.   Neurological: Positive for dizziness. Negative for weakness, headaches and paresthesias.   Psychiatric/Behavioral: Negative for mood changes. The patient is not nervous/anxious.        OBJECTIVE:   /72   Pulse 59   Temp 98  F (36.7  C)   Resp 14   Ht 1.778 m (5' 10\")   Wt 91.7 kg (202 lb 3.2 oz)   SpO2 98%   BMI 29.01 kg/m   Estimated body mass index is 29.01 kg/m  as calculated from the following:    Height as of this encounter: 1.778 m (5' 10\").    Weight as of this encounter: 91.7 kg (202 lb 3.2 oz).     Physical Exam  GENERAL: alert and no distress  EYES: Eyes grossly normal to inspection, PERRL and conjunctivae and sclerae normal  HENT: ear canals and TM's normal, nose and mouth without ulcers or lesions  NECK: no adenopathy, no asymmetry, masses, or scars and thyroid normal to " palpation  RESP: lungs clear to auscultation - no rales, rhonchi or wheezes  CV: regular rate and rhythm, normal S1 S2, no S3 or S4, no murmur, click or rub  ABDOMEN: soft, nontender, no hepatosplenomegaly, no masses and bowel sounds normal  MS: no gross musculoskeletal defects noted  NEURO: No focal changes  PSYCH: mentation appears normal, affect normal/bright    Component      Latest Ref Rng & Units 10/19/2022 10/19/2022 10/19/2022 10/19/2022          11:58 AM 11:58 AM 11:58 AM 11:58 AM   Sodium      133 - 144 mmol/L   138    Potassium      3.4 - 5.3 mmol/L   4.2    Chloride      94 - 109 mmol/L   103    Carbon Dioxide      20 - 32 mmol/L   30    Anion Gap      3 - 14 mmol/L   5    Urea Nitrogen      7 - 30 mg/dL   13    Creatinine      0.66 - 1.25 mg/dL   1.03    Calcium      8.5 - 10.1 mg/dL   9.1    Glucose      70 - 99 mg/dL   100 (H)    Alkaline Phosphatase      40 - 150 U/L   71    AST      0 - 45 U/L   12    ALT      0 - 70 U/L   15    Protein Total      6.8 - 8.8 g/dL   7.5    Albumin      3.4 - 5.0 g/dL   3.7    Bilirubin Total      0.2 - 1.3 mg/dL   0.7    GFR Estimate      >60 mL/min/1.73m2   71    WBC      4.0 - 11.0 10e3/uL 5.9      RBC Count      4.40 - 5.90 10e6/uL 4.37 (L)      Hemoglobin      13.3 - 17.7 g/dL 14.2      Hematocrit      40.0 - 53.0 % 42.3      MCV      78 - 100 fL 97      MCH      26.5 - 33.0 pg 32.5      MCHC      31.5 - 36.5 g/dL 33.6      RDW      10.0 - 15.0 % 11.9      Platelet Count      150 - 450 10e3/uL 160      Cholesterol      <200 mg/dL  122     Triglycerides      <150 mg/dL  141     HDL Cholesterol      >=40 mg/dL  49     LDL Cholesterol Calculated      <=100 mg/dL  45     Non HDL Cholesterol      <130 mg/dL  73     Patient Fasting > 8hrs?        No     TSH      0.40 - 4.00 mU/L    3.37       ASSESSMENT / PLAN:   (Z00.00) Encounter for subsequent annual wellness visit in Medicare patient  (primary encounter diagnosis)  Comment: Advised patient to consider updating  "tetanus booster.  Plan:     (I63.81) Lacunar infarction (H)  Comment:   Plan: aspirin (ASA) 325 MG EC tablet        Noted as baseline history.  Stable.  On aspirin daily.    (I10) Essential hypertension, benign  Comment: Stable on therapy and continue with current medical management.  Suggest recheck blood pressure 6 1  Plan:     (E03.9) Hypothyroidism, unspecified type  Comment:   TSH   Date Value Ref Range Status   10/19/2022 3.37 0.40 - 4.00 mU/L Final   08/17/2020 3.93 0.40 - 4.00 mU/L Final     Plan: We will as noted.    (Z85.46) History of prostate cancer  Comment: Noted as baseline history.  Plan:     Patient has been advised of split billing requirements and indicates understanding: Yes    COUNSELING:  Reviewed preventive health counseling, as reflected in patient instructions       Regular exercise       Healthy diet/nutrition    BMI:   Estimated body mass index is 28.78 kg/m  as calculated from the following:    Height as of 10/19/22: 1.778 m (5' 10\").    Weight as of 10/19/22: 91 kg (200 lb 9.6 oz).       He reports that he has never smoked. He has never used smokeless tobacco.    Appropriate preventive services were discussed with this patient, including applicable screening as appropriate for cardiovascular disease, diabetes, osteopenia/osteoporosis, and glaucoma.  As appropriate for age/gender, discussed screening for colorectal cancer, prostate cancer, breast cancer, and cervical cancer. Checklist reviewing preventive services available has been given to the patient.    Reviewed patients plan of care and provided an AVS. The Basic Care Plan (routine screening as documented in Health Maintenance) for Abundio meets the Care Plan requirement. This Care Plan has been established and reviewed with the Patient.    Colton Chris MD  [facility]    Identified Health Risks:  "

## 2023-02-16 ENCOUNTER — NURSE TRIAGE (OUTPATIENT)
Dept: INTERNAL MEDICINE | Facility: CLINIC | Age: 88
End: 2023-02-16

## 2023-02-16 ENCOUNTER — TELEPHONE (OUTPATIENT)
Dept: INTERNAL MEDICINE | Facility: CLINIC | Age: 88
End: 2023-02-16

## 2023-02-16 ENCOUNTER — VIRTUAL VISIT (OUTPATIENT)
Dept: INTERNAL MEDICINE | Facility: CLINIC | Age: 88
End: 2023-02-16
Payer: COMMERCIAL

## 2023-02-16 DIAGNOSIS — U07.1 INFECTION DUE TO 2019 NOVEL CORONAVIRUS: Primary | ICD-10-CM

## 2023-02-16 PROCEDURE — 99213 OFFICE O/P EST LOW 20 MIN: CPT | Mod: 95 | Performed by: INTERNAL MEDICINE

## 2023-02-16 NOTE — PROGRESS NOTES
Abundio is a 87 year old who is being evaluated via a billable video visit.      How would you like to obtain your AVS? MyChart  If the video visit is dropped, the invitation should be resent by: Text to cell phone: [phone]  Will anyone else be joining your video visit? No      Assessment & Plan     Infection due to 2019 novel coronavirus  Discussed with patient.  Will treat empirically.  Normal renal function.  - nirmatrelvir and ritonavir (PAXLOVID) therapy pack; Take 3 tablets by mouth 2 times daily for 5 days (Take 2 Nirmatrelvir tablets and 1 Ritonavir tablet twice daily for 5 days)     See Patient Instructions    Return in about 1 week (around 2/23/2023) for if symptoms recur or worsen.    Colton Chris MD  [facility]    Discussed with patient dosing of medication as well as potential side effects.  Suggest holding statin for period of time that includes the duration of therapy +5 days.  Advised also for reassurance to consider dose reduction of amlodipine by 50% also during duration of therapy +5 days then resuming routine medications accordingly.    Subjective   Abundio is a 87 year old accompanied by his self, presenting for the following health issues:    Covid Concern    HPI     Following up today for issues of COVID.    He notes onset of symptoms:      Date of positive COVID test (PCR or at home)? 2/16/2023  Current COVID symptoms: - fever or chills  - fatigue  - muscle or body aches  - headache  - sore throat  - congestion or runny nose    Date COVID symptoms began: 2/15/2023    Review of Systems   EYES: NEGATIVE for vision changes or irritation  CV: NEGATIVE for chest pain, palpitations or peripheral edema  GI: NEGATIVE for nausea, abdominal pain, heartburn, or change in bowel habits  : NEGATIVE for frequency, dysuria, or hematuria  MUSCULOSKELETAL: NEGATIVE for significant arthralgias or myalgia  NEURO: NEGATIVE for weakness, dizziness or paresthesias  HEME:  NEGATIVE for bleeding problems  PSYCHIATRIC: NEGATIVE for changes in mood or affect      Objective         Vitals:  No vitals were obtained today due to virtual visit.    Physical Exam   GENERAL: Healthy, alert and no distress  EYES: Eyes grossly normal to inspection.  No discharge or erythema, or obvious scleral/conjunctival abnormalities.  RESP: No audible wheeze, cough, or visible cyanosis.  No visible retractions or increased work of breathing.    SKIN: Visible skin clear. No significant rash, abnormal pigmentation or lesions.  NEURO: Cranial nerves grossly intact.  Mentation and speech appropriate for age.  PSYCH: Mentation appears normal, affect normal/bright, judgement and insight intact, normal speech and appearance well-groomed.    Creatinine   Date Value Ref Range Status   10/19/2022 1.03 0.66 - 1.25 mg/dL Final   12/04/2020 1.05 0.66 - 1.25 mg/dL Final             Video-Visit Details    Type of service:  Video Visit   Video Start Time:  245PM  Video End Time:   250PM    Originating Location (pt. Location): Home  Distant Location (provider location):  On-site  Platform used for Video Visit: Sumeet

## 2023-02-16 NOTE — TELEPHONE ENCOUNTER
RN COVID TREATMENT VISIT  02/16/23      The patient has been triaged and does not require a higher level of care.    Abundio Beckett  87 year old  Current weight? 202 lbs    Has the patient been seen by a primary care provider at an [facility] or [facility] within the past two years? Yes.   Have you been in close proximity to/do you have a known exposure to a person with a confirmed case of influenza? No.     General treatment eligibility:  Date of positive COVID test (PCR or at home)?  Home test     Are you or have you been hospitalized for this COVID-19 infection? No.   Have you received monoclonal antibodies or antiviral treatment for COVID-19 since this positive test? No.   Do you have any of the following conditions that place you at risk of being very sick from COVID-19?   - Age 50 years or older  Yes, patient has at least one high risk condition as noted above.     Current COVID symptoms:   - fatigue  - headache  - sore throat  - congestion or runny nose  Yes. Patient has at least one symptom as selected.     How many days since symptoms started? 5 days or less. Established patient, 12 years or older weighing at least 88.2 lbs, who has symptoms that started in the past 5 days, has not been hospitalized nor received treatment already, and is at risk for being very sick from COVID-19.     Treatment eligibility by RN:    Are you currently pregnant or nursing? No    Do you have a clinically significant hypersensitivity to nirmatrelvir or ritonavir, or toxic epidermal necrolysis (TEN) or Roque-Sujit Syndrome? No    Do you have a history of hepatitis, any hepatic impairment on the Problem List (such as Child-Crews Class C, cirrhosis, fatty liver disease, alcoholic liver disease), or was the last liver lab (hepatic panel, ALT, AST, ALK Phos, bilirubin) elevated in the past 6 months? No    Do you have any history of severe renal impairment (eGFR < 30mL/min)? No    Is patient eligible  "to continue?   Yes, patient meets all eligibility requirements for the RN COVID treatment (as denoted by all no responses above).     Current Outpatient Medications   Medication Sig Dispense Refill     amLODIPine (NORVASC) 5 MG tablet TAKE 1 TABLET BY MOUTH  DAILY 90 tablet 0     aspirin (ASA) 325 MG EC tablet Take 1 tablet (325 mg) by mouth daily 100 tablet 3     CENTRUM SILVER OR TABS 1 TABLET DAILY       furosemide (LASIX) 20 MG tablet TAKE 1 TABLET BY MOUTH  DAILY AS NEEDED FOR EDEMA 100 tablet 2     levothyroxine (SYNTHROID/LEVOTHROID) 137 MCG tablet TAKE 1 TABLET BY MOUTH  DAILY 90 tablet 0     simvastatin (ZOCOR) 20 MG tablet TAKE 1 TABLET BY MOUTH AT  BEDTIME 90 tablet 1     timolol maleate (TIMOPTIC) 0.5 % ophthalmic solution        XALATAN 0.005 % OP SOLN qd         Medications from List 1 of the standing order (on medications that exclude the use of Paxlovid) that patient is taking: NONE. Is patient taking Takilma's Wort? No  Is patient taking Takilma's Wort or any meds from List 1? No.   Medications from List 2 of the standing order (on meds that provider needs to adjust) that patient is taking: amlodipine (Norvasc), explained a provider visit is necessary to discuss medication adjustments while taking Paxlovid. Is patient on any of the meds from List 2? Yes. Patient will be scheduled or transferred to a  at the end of this call.     Pt is scheduled with PCP for a VV for 2/16/23 @ 1630. Advised pt to call us back if anything changes before his appt today. Pt agrees to plan.   Cornelia Bolton RN     S- Pt called the clinic stating he tested positive for covid 19 on 2/16/23.   B-Pt stated he thinks his symptoms started 2/15/23.   A- Pt reports having a sore throat, runny nose, mild HA, fatigued. Pt reported inspiratory/expiratory wheezing when laying down but stated  \" I wouldn't put it in the serious category at this point\".   Pt reported \"it is sometimes difficult to breathe when I lay " "down\". Pt reported this is not currently happening.   Pt stated he can stand but not for too long because he feels weak.   Pt denies chest pain/chest pressure and SOB currently.      1. COVID-19 DIAGNOSIS: \"Who made your COVID-19 diagnosis?\" \"Was it confirmed by a positive lab test or self-test?\" If not diagnosed by a doctor (or NP/PA), ask \"Are there lots of cases (community spread) where you live?\" Note: See public health department website, if unsure.      Home test 2/16/23  2. COVID-19 EXPOSURE: \"Was there any known exposure to COVID before the symptoms began?\" CDC Definition of close contact: within 6 feet (2 meters) for a total of 15 minutes or more over a 24-hour period.       In building- a couple other people are sick in the building   3. ONSET: \"When did the COVID-19 symptoms start?\"       2/15/23  4. WORST SYMPTOM: \"What is your worst symptom?\" (e.g., cough, fever, shortness of breath, muscle aches)      Sore throat, runny nose.   5. COUGH: \"Do you have a cough?\" If Yes, ask: \"How bad is the cough?\"        No  6. FEVER: \"Do you have a fever?\" If Yes, ask: \"What is your temperature, how was it measured, and when did it start?\"      No  7. RESPIRATORY STATUS: \"Describe your breathing?\" (e.g., shortness of breath, wheezing, unable to speak)       Wheezing- both inspiration/exp. Pt stated \" I wouldn't put it in the serious category at this point\" - when laying down   8. BETTER-SAME-WORSE: \"Are you getting better, staying the same or getting worse compared to yesterday?\"  If getting worse, ask, \"In what way?\"      Worse than yesterday   9. HIGH RISK DISEASE: \"Do you have any chronic medical problems?\" (e.g., asthma, heart or lung disease, weak immune system, obesity, etc.)      None   10. VACCINE: \"Have you had the COVID-19 vaccine?\" If Yes, ask: \"Which one, how many shots, when did you get it?\"        Yes  11. BOOSTER: \"Have you received your COVID-19 booster?\" If Yes, ask: \"Which one and when did you get " "it?\"        Around 11/2022          13. OTHER SYMPTOMS: \"Do you have any other symptoms?\"  (e.g., chills, fatigue, headache, loss of smell or taste, muscle pain, sore throat)        Fatigue   14. O2 SATURATION MONITOR:  \"Do you use an oxygen saturation monitor (pulse oximeter) at home?\" If Yes, ask \"What is your reading (oxygen level) today?\" \"What is your usual oxygen saturation reading?\" (e.g., 95%)        No    Reason for Disposition    [1] HIGH RISK for severe COVID complications (e.g., weak immune system, age > 64 years, obesity with BMI of 30 or higher, pregnant, chronic lung disease or other chronic medical condition) AND [2] COVID symptoms (e.g., cough, fever)  (Exceptions: Already seen by PCP and no new or worsening symptoms.)    Additional Information    Negative: SEVERE difficulty breathing (e.g., struggling for each breath, speaks in single words)    Negative: Difficult to awaken or acting confused (e.g., disoriented, slurred speech)    Negative: Bluish (or gray) lips or face now    Negative: Shock suspected (e.g., cold/pale/clammy skin, too weak to stand, low BP, rapid pulse)    Negative: Sounds like a life-threatening emergency to the triager    Negative: [1] Diagnosed or suspected COVID-19 AND [2] symptoms lasting 3 or more weeks    Negative: [1] COVID-19 exposure AND [2] no symptoms    Negative: COVID-19 vaccine reaction suspected (e.g., fever, headache, muscle aches) occurring 1 to 3 days after getting vaccine    Negative: COVID-19 vaccine, questions about    Negative: [1] Lives with someone known to have influenza (flu test positive) AND [2] flu-like symptoms (e.g., cough, runny nose, sore throat, SOB; with or without fever)    Negative: [1] Adult with possible COVID-19 symptoms AND [2] triager concerned about severity of symptoms or other causes    Negative: COVID-19 and breastfeeding, questions about    Negative: SEVERE or constant chest pain or pressure  (Exception: Mild central chest pain, " present only when coughing.)    Negative: MODERATE difficulty breathing (e.g., speaks in phrases, SOB even at rest, pulse 100-120)    Negative: Headache and stiff neck (can't touch chin to chest)    Negative: Oxygen level (e.g., pulse oximetry) 90 percent or lower    Negative: Chest pain or pressure  (Exception: MILD central chest pain, present only when coughing)    Negative: Patient sounds very sick or weak to the triager    Negative: MILD difficulty breathing (e.g., minimal/no SOB at rest, SOB with walking, pulse <100)    Negative: Fever > 103 F (39.4 C)    Negative: [1] Fever > 101 F (38.3 C) AND [2] over 60 years of age    Negative: [1] Fever > 100.0 F (37.8 C) AND [2] bedridden (e.g., nursing home patient, CVA, chronic illness, recovering from surgery)    Protocols used: CORONAVIRUS (COVID-19) DIAGNOSED OR [id number]

## 2023-02-16 NOTE — TELEPHONE ENCOUNTER
COVID Positive/Requesting COVID treatment    Patient is positive for COVID and requesting treatment options.    Date of positive COVID test (PCR or at home)? 2/16/2023  Current COVID symptoms: - fever or chills  - fatigue  - muscle or body aches  - headache  - sore throat  - congestion or runny nose  Date COVID symptoms began: 2/15/2023    Message should be routed to clinic RN pool. Best phone number to use for call back: [phone]

## 2023-02-27 ENCOUNTER — NURSE TRIAGE (OUTPATIENT)
Dept: NURSING | Facility: CLINIC | Age: 88
End: 2023-02-27
Payer: COMMERCIAL

## 2023-02-28 NOTE — TELEPHONE ENCOUNTER
"Covid 11 days ago and completed Paxlovid 2-21-23.  Patient said he had recovered and had \"mild\" case.      Today developed sore throat 5/10 though afebrile, 36.6 forehead 97.7.  He said he took another Covid test which was positive.  Advised patient is highly unlikely for new Covid infection.  Patient denies other symptoms.    Disposition is to home care but should call back with new fever or worsening symptoms.  Patient verbalizes understanding and agrees with plan.      Rosana Lu RN  [facility]      Reason for Disposition    [1] Sore throat is the only symptom AND [2] sore throat present < 48 hours    Additional Information    Negative: SEVERE difficulty breathing (e.g., struggling for each breath, speaks in single words, stridor)    Negative: Sounds like a life-threatening emergency to the triager    Negative: [1] Diagnosed strep throat AND [2] taking antibiotic AND [3] symptoms continue    Negative: [1] Drooling or spitting out saliva (because can't swallow) AND [2] normal breathing    Negative: Unable to open mouth completely    Negative: [1] Difficulty breathing AND [2] not severe    Negative: Fever > 104 F (40 C)    Negative: [1] Refuses to drink anything AND [2] for > 12 hours    Negative: [1] Drinking very little AND [2] dehydration suspected (e.g., no urine > 12 hours, very dry mouth, very lightheaded)    Negative: Patient sounds very sick or weak to the triager    Negative: SEVERE (e.g., excruciating) throat pain    Negative: [1] Pus on tonsils (back of throat) AND [2]  fever AND [3] swollen neck lymph nodes (\"glands\")    Negative: [1] Rash AND [2] widespread (especially chest and abdomen)    Negative: Earache also present    Negative: Fever present > 3 days (72 hours)    Negative: Diabetes mellitus or weak immune system (e.g., HIV positive, cancer chemo, splenectomy, organ transplant, chronic steroids)    Negative: History of rheumatic fever    Negative: [1] Adult is leaving on a trip " AND [2] requests an antibiotic NOW    Negative: [1] Positive throat culture or rapid strep test (according to lab, PCP, caller, etc.) AND [2] NO  standing order to call in prescription for antibiotic    Negative: [1] Exposure to family member (or spouse or boyfriend/girlfriend) with test-proven strep AND [2] within last 10 days    Negative: [1] Sore throat is the only symptom AND [2] present > 48 hours    Negative: [1] Sore throat with cough/cold symptoms AND [2] present > 5 days    Protocols used: SORE THROAT-A-

## 2023-04-13 DIAGNOSIS — E78.5 HYPERLIPIDEMIA LDL GOAL <100: ICD-10-CM

## 2023-04-13 DIAGNOSIS — I63.81 LACUNAR INFARCTION (H): ICD-10-CM

## 2023-04-14 RX ORDER — SIMVASTATIN 20 MG
TABLET ORAL
Qty: 90 TABLET | Refills: 2 | Status: SHIPPED | OUTPATIENT
Start: 2023-04-14 | End: 2023-12-26

## 2023-06-01 ENCOUNTER — TRANSFERRED RECORDS (OUTPATIENT)
Dept: HEALTH INFORMATION MANAGEMENT | Facility: CLINIC | Age: 88
End: 2023-06-01
Payer: COMMERCIAL

## 2023-06-07 DIAGNOSIS — E03.9 HYPOTHYROIDISM, UNSPECIFIED TYPE: ICD-10-CM

## 2023-06-08 RX ORDER — LEVOTHYROXINE SODIUM 137 UG/1
TABLET ORAL
Qty: 90 TABLET | Refills: 1 | Status: SHIPPED | OUTPATIENT
Start: 2023-06-08 | End: 2023-08-21

## 2023-06-08 NOTE — TELEPHONE ENCOUNTER
Prescription approved per [facility] Refill Protocol.  Hollie Albarran, RN  [facility] Triage Nurse

## 2023-06-10 DIAGNOSIS — E03.9 HYPOTHYROIDISM, UNSPECIFIED TYPE: ICD-10-CM

## 2023-06-12 RX ORDER — LEVOTHYROXINE SODIUM 137 UG/1
TABLET ORAL
Qty: 90 TABLET | Refills: 3 | OUTPATIENT
Start: 2023-06-12

## 2023-06-12 NOTE — TELEPHONE ENCOUNTER
Pharmacy requested duplicate. Medication refused.     Script sent to the pharmacy 6/8/23 for 90 tablets with one additional refill on file.    Hollie Albarran RN

## 2023-06-14 DIAGNOSIS — I10 ESSENTIAL HYPERTENSION, BENIGN: ICD-10-CM

## 2023-06-14 RX ORDER — AMLODIPINE BESYLATE 5 MG/1
TABLET ORAL
Qty: 90 TABLET | Refills: 0 | Status: SHIPPED | OUTPATIENT
Start: 2023-06-14 | End: 2023-08-09

## 2023-08-08 DIAGNOSIS — I10 ESSENTIAL HYPERTENSION, BENIGN: ICD-10-CM

## 2023-08-09 RX ORDER — AMLODIPINE BESYLATE 5 MG/1
TABLET ORAL
Qty: 90 TABLET | Refills: 3 | Status: SHIPPED | OUTPATIENT
Start: 2023-08-09 | End: 2024-07-22

## 2023-08-23 ENCOUNTER — OFFICE VISIT (OUTPATIENT)
Dept: DERMATOLOGY | Facility: CLINIC | Age: 88
End: 2023-08-23
Payer: COMMERCIAL

## 2023-08-23 DIAGNOSIS — L57.0 ACTINIC KERATOSIS: Primary | ICD-10-CM

## 2023-08-23 DIAGNOSIS — L82.0 INFLAMED SEBORRHEIC KERATOSIS: ICD-10-CM

## 2023-08-23 DIAGNOSIS — L81.4 LENTIGINES: ICD-10-CM

## 2023-08-23 DIAGNOSIS — Z85.828 HISTORY OF BASAL CELL CARCINOMA: ICD-10-CM

## 2023-08-23 DIAGNOSIS — L57.8 ACTINIC SKIN DAMAGE: ICD-10-CM

## 2023-08-23 PROCEDURE — 99213 OFFICE O/P EST LOW 20 MIN: CPT | Mod: 25 | Performed by: STUDENT IN AN ORGANIZED HEALTH CARE EDUCATION/TRAINING PROGRAM

## 2023-08-23 PROCEDURE — 17003 DESTRUCT PREMALG LES 2-14: CPT | Mod: XS | Performed by: STUDENT IN AN ORGANIZED HEALTH CARE EDUCATION/TRAINING PROGRAM

## 2023-08-23 PROCEDURE — 17000 DESTRUCT PREMALG LESION: CPT | Mod: XS | Performed by: STUDENT IN AN ORGANIZED HEALTH CARE EDUCATION/TRAINING PROGRAM

## 2023-08-23 PROCEDURE — 17110 DESTRUCTION B9 LES UP TO 14: CPT | Performed by: STUDENT IN AN ORGANIZED HEALTH CARE EDUCATION/TRAINING PROGRAM

## 2023-08-23 NOTE — LETTER
8/23/2023         RE: Abundio Beckett  [address]  [facility] [address]        Dear Colleague,    Thank you for referring your patient, Abundio Beckett, to the [facility]. Please see a copy of my visit note below.    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    Diagnoses and all orders for this visit:    Actinic keratosis  -     MO DESTRUCT PREMALIGNANT LESION, FIRST [[id number]]  -     MO DESTRUCT PREMALIGNANT LESION, 2-14 [[id number]]  - x4 on face  - see procedure note    Inflamed seborrheic keratosis  -     DESTRUCT BENIGN LESION, UP TO 14  - x10 on trunk and extremities  - see procedure note    History of basal cell carcinoma  Lentigines  Actinic skin damage  - discussed sunprotective behaviors, clothing, and the use of sunscreen        Cryotherapy procedure note: After verbal consent and discussion of risks and benefits including but no limited to dyspigmentation/scar, blister, and pain, 10 ISKs on trunk and extremities and 4 aks on face was(were) treated with 1-2mm freeze border for 2 cycles with liquid nitrogen. Post cryotherapy instructions were provided.     Follow-up in 1 year .       Staff Involved:  Staff Only    Jah Peters MD   of Dermatology  Department of Dermatology  [facility]      CC:   Chief Complaint   Patient presents with     Skin Check       History of Present Illness:  Mr. Abundio Beckett is a 87 year old male who presents as a return patient.    Been doing well since last visit.  Is enjoying the hot weather.  He grew up in [place] but spends a lot of time in [place].  Has noticed some growing stuck on spots on the left temple and the right posterior arm.  States  that he had a skin cancer removed from the left cheek.  Reviewed his biopsies and clarified that he has had a skin cancer on his tricep, and did have biopsies on the left side of his face but these returned benign    Labs:      Physical exam:  Vitals: There were no vitals taken for this visit.  GEN: well developed, well-nourished, in no acute distress, in a pleasant mood.     SKIN: Prieto phototype 1  - Full skin, which includes the head/face, both arms, chest, back, abdomen,both legs, genitalia and/or groin buttocks, digits and/or nails, was examined.  - Stuck on brown papules on trunk and extremities   - gritty pink papules on face  - Flat brown macules and patches in a sun exposed areas on face and extremities  - No obvious evidence of recurrence at site of previous malignancy  - No other lesions of concern on areas examined.     Past Medical History:   Past Medical History:   Diagnosis Date     Basal cell carcinoma      BENIGN PROSTATIC HYPERTROPHY 9/17/2002     Essential hypertension, benign 11/8/2016     Hyperlipidemia LDL goal <100 5/29/2012     Hypertrophy (benign) of prostate     abstracted 7/5/02.     HYPOTHYROIDISM NOS 9/17/2002     Lacunar infarction (H) 9/4/2012     Unspecified hypothyroidism     abstracted 7/5/02.     Past Surgical History:   Procedure Laterality Date     HC SHOULDER ARTHROSCOPY, DX       ZZC REMV PROSTATE,PERINEAL,RADICAL  2000       Social History:   reports that he has never smoked. He has never used smokeless tobacco. He reports current alcohol use. He reports that he does not use drugs.    Family History:  No family history on file.    Medications:  Current Outpatient Medications   Medication Sig Dispense Refill     amLODIPine (NORVASC) 5 MG tablet TAKE 1 TABLET BY MOUTH DAILY 90 tablet 3     aspirin (ASA) 325 MG EC tablet Take 1 tablet (325 mg) by mouth daily 100 tablet 3     CENTRUM SILVER OR TABS 1 TABLET DAILY       furosemide (LASIX) 20 MG tablet TAKE 1 TABLET BY MOUTH   DAILY AS NEEDED FOR EDEMA 100 tablet 2     levothyroxine (SYNTHROID/LEVOTHROID) 137 MCG tablet TAKE 1 TABLET BY MOUTH DAILY 100 tablet 0     simvastatin (ZOCOR) 20 MG tablet TAKE 1 TABLET BY MOUTH AT  BEDTIME 90 tablet 2     timolol maleate (TIMOPTIC) 0.5 % ophthalmic solution        XALATAN 0.005 % OP SOLN qd       Allergies   Allergen Reactions     No Known Drug Allergy                  Again, thank you for allowing me to participate in the care of your patient.        Sincerely,        Jah Peters MD

## 2023-08-23 NOTE — PROGRESS NOTES
[facility] Dermatology Note    Encounter Date: Aug 23, 2023    Dermatology Problem List:  - BCC L tricep s/p ED/C 10/26/2021     Major PMHx  -   ______________________________________    Impression/Plan:  Abundio was seen today for skin check.    Diagnoses and all orders for this visit:    Actinic keratosis  -     MT DESTRUCT PREMALIGNANT LESION, FIRST [[id number]]  -     MT DESTRUCT PREMALIGNANT LESION, 2-14 [[id number]]  - x4 on face  - see procedure note    Inflamed seborrheic keratosis  -     DESTRUCT BENIGN LESION, UP TO 14  - x10 on trunk and extremities  - see procedure note    History of basal cell carcinoma  Lentigines  Actinic skin damage  - discussed sunprotective behaviors, clothing, and the use of sunscreen        Cryotherapy procedure note: After verbal consent and discussion of risks and benefits including but no limited to dyspigmentation/scar, blister, and pain, 10 ISKs on trunk and extremities and 4 aks on face was(were) treated with 1-2mm freeze border for 2 cycles with liquid nitrogen. Post cryotherapy instructions were provided.     Follow-up in 1 year .       Staff Involved:  Staff Only    Jah Peters MD   of Dermatology  Department of Dermatology  [facility]      CC:   Chief Complaint   Patient presents with    Skin Check       History of Present Illness:  Mr. Abundio Beckett is a 87 year old male who presents as a return patient.    Been doing well since last visit.  Is enjoying the hot weather.  He grew up in [place] but spends a lot of time in [place].  Has noticed some growing stuck on spots on the left temple and the right posterior arm.  States that he had a skin cancer removed from the left cheek.  Reviewed his biopsies and clarified that he has had a skin cancer on his tricep, and did have biopsies on the left side of his face but these returned benign    Labs:      Physical exam:  Vitals: There were no vitals  taken for this visit.  GEN: well developed, well-nourished, in no acute distress, in a pleasant mood.     SKIN: Prieto phototype 1  - Full skin, which includes the head/face, both arms, chest, back, abdomen,both legs, genitalia and/or groin buttocks, digits and/or nails, was examined.  - Stuck on brown papules on trunk and extremities   - gritty pink papules on face  - Flat brown macules and patches in a sun exposed areas on face and extremities  - No obvious evidence of recurrence at site of previous malignancy  - No other lesions of concern on areas examined.     Past Medical History:   Past Medical History:   Diagnosis Date    Basal cell carcinoma     BENIGN PROSTATIC HYPERTROPHY 9/17/2002    Essential hypertension, benign 11/8/2016    Hyperlipidemia LDL goal <100 5/29/2012    Hypertrophy (benign) of prostate     abstracted 7/5/02.    HYPOTHYROIDISM NOS 9/17/2002    Lacunar infarction (H) 9/4/2012    Unspecified hypothyroidism     abstracted 7/5/02.     Past Surgical History:   Procedure Laterality Date    HC SHOULDER ARTHROSCOPY, DX      ZZC REMV PROSTATE,PERINEAL,RADICAL  2000       Social History:   reports that he has never smoked. He has never used smokeless tobacco. He reports current alcohol use. He reports that he does not use drugs.    Family History:  No family history on file.    Medications:  Current Outpatient Medications   Medication Sig Dispense Refill    amLODIPine (NORVASC) 5 MG tablet TAKE 1 TABLET BY MOUTH DAILY 90 tablet 3    aspirin (ASA) 325 MG EC tablet Take 1 tablet (325 mg) by mouth daily 100 tablet 3    CENTRUM SILVER OR TABS 1 TABLET DAILY      furosemide (LASIX) 20 MG tablet TAKE 1 TABLET BY MOUTH  DAILY AS NEEDED FOR EDEMA 100 tablet 2    levothyroxine (SYNTHROID/LEVOTHROID) 137 MCG tablet TAKE 1 TABLET BY MOUTH DAILY 100 tablet 0    simvastatin (ZOCOR) 20 MG tablet TAKE 1 TABLET BY MOUTH AT  BEDTIME 90 tablet 2    timolol maleate (TIMOPTIC) 0.5 % ophthalmic solution        XALATAN 0.005 % OP SOLN qd       Allergies   Allergen Reactions    No Known Drug Allergy

## 2023-10-07 ENCOUNTER — LAB (OUTPATIENT)
Dept: LAB | Facility: CLINIC | Age: 88
End: 2023-10-07
Payer: COMMERCIAL

## 2023-10-07 DIAGNOSIS — E03.9 HYPOTHYROIDISM, UNSPECIFIED TYPE: Primary | ICD-10-CM

## 2023-10-07 LAB — TSH SERPL DL<=0.005 MIU/L-ACNC: 2.79 UIU/ML (ref 0.3–4.2)

## 2023-10-07 PROCEDURE — 84443 ASSAY THYROID STIM HORMONE: CPT

## 2023-10-07 PROCEDURE — 36415 COLL VENOUS BLD VENIPUNCTURE: CPT

## 2023-10-12 ENCOUNTER — VIRTUAL VISIT (OUTPATIENT)
Dept: INTERNAL MEDICINE | Facility: CLINIC | Age: 88
End: 2023-10-12
Payer: COMMERCIAL

## 2023-10-12 DIAGNOSIS — E03.9 HYPOTHYROIDISM, UNSPECIFIED TYPE: Primary | ICD-10-CM

## 2023-10-12 DIAGNOSIS — I10 ESSENTIAL HYPERTENSION, BENIGN: ICD-10-CM

## 2023-10-12 PROCEDURE — 99442 PR PHYSICIAN TELEPHONE EVALUATION 11-20 MIN: CPT | Mod: 93 | Performed by: INTERNAL MEDICINE

## 2023-10-12 RX ORDER — LEVOTHYROXINE SODIUM 137 UG/1
137 TABLET ORAL DAILY
Qty: 90 TABLET | Refills: 3 | Status: SHIPPED | OUTPATIENT
Start: 2023-10-12

## 2023-10-12 NOTE — PROGRESS NOTES
"Abundio is a 87 year old who is being evaluated via a billable telephone visit.      What phone number would you like to be contacted at? [phone]  How would you like to obtain your AVS? Shantal    Distant Location (provider location):  On-site    Assessment & Plan     Hypothyroidism, unspecified type  Stable on therapy  - levothyroxine (SYNTHROID/LEVOTHROID) 137 MCG tablet; Take 1 tablet (137 mcg) by mouth daily    Essential hypertension, benign  Stable on meds as ordered    Discussed vaccination updates    BMI:   Estimated body mass index is 29.01 kg/m  as calculated from the following:    Height as of 2/13/23: 1.778 m (5' 10\").    Weight as of 2/13/23: 91.7 kg (202 lb 3.2 oz).   Weight management plan: Discussed healthy diet and exercise guidelines    See Patient Instructions    Colton Chris MD  [facility]    Maty Del Castillo is a 87 year old, presenting for the following health issues:  Recheck Medication      HPI     Hypothyroidism Follow-up    Since last visit, patient describes the following symptoms: Weight stable, no hair loss, no skin changes, no constipation, no loose stools    Hypertension Follow-up    Do you check your blood pressure regularly outside of the clinic? No   Are you following a low salt diet? Yes  Are your blood pressures ever more than 140 on the top number (systolic) OR more   than 90 on the bottom number (diastolic), for example 140/90? No    Other concerns:  1. Decreased hearing in left ear, ringing. Patient scheduled with ENT (Dr. Mccracken) on Monday.    Review of Systems   CONSTITUTIONAL: NEGATIVE for fever, chills, change in weight  EYES: NEGATIVE for vision changes or irritation  ENT/MOUTH: NEGATIVE for mouth and throat problems  RESP: NEGATIVE for significant cough or SOB  CV: NEGATIVE for chest pain, palpitations or peripheral edema  GI: NEGATIVE for nausea, abdominal pain, heartburn, or change in bowel habits  : NEGATIVE for frequency, " dysuria, or hematuria  MUSCULOSKELETAL: NEGATIVE for significant arthralgias or myalgia  HEME: NEGATIVE for bleeding problems  PSYCHIATRIC: NEGATIVE for changes in mood or affect      Objective    Vitals - Patient Reported  Weight (Patient Reported): 88.5 kg (195 lb)      Vitals:  No vitals were obtained today due to virtual visit.    Physical Exam   alert and no distress  PSYCH: Alert and oriented times 3; coherent speech, normal   rate and volume, able to articulate logical thoughts, able   to abstract reason, no tangential thoughts, no hallucinations   or delusions  His affect is normal  RESP: No cough, no audible wheezing, able to talk in full sentences  Remainder of exam unable to be completed due to telephone visits    TSH   Date Value Ref Range Status   10/07/2023 2.79 0.30 - 4.20 uIU/mL Final   10/19/2022 3.37 0.40 - 4.00 mU/L Final   08/17/2020 3.93 0.40 - 4.00 mU/L Final           Phone call duration: 15 minutes

## 2023-10-17 DIAGNOSIS — I63.81 LACUNAR INFARCTION (H): ICD-10-CM

## 2023-10-17 DIAGNOSIS — E78.5 HYPERLIPIDEMIA LDL GOAL <100: ICD-10-CM

## 2023-10-18 RX ORDER — SIMVASTATIN 20 MG
TABLET ORAL
Qty: 100 TABLET | Refills: 2 | OUTPATIENT
Start: 2023-10-18

## 2023-11-09 ENCOUNTER — TRANSFERRED RECORDS (OUTPATIENT)
Dept: HEALTH INFORMATION MANAGEMENT | Facility: CLINIC | Age: 88
End: 2023-11-09
Payer: COMMERCIAL

## 2023-12-22 DIAGNOSIS — E78.5 HYPERLIPIDEMIA LDL GOAL <100: ICD-10-CM

## 2023-12-22 DIAGNOSIS — I63.81 LACUNAR INFARCTION (H): ICD-10-CM

## 2023-12-26 RX ORDER — SIMVASTATIN 20 MG
TABLET ORAL
Qty: 90 TABLET | Refills: 0 | Status: SHIPPED | OUTPATIENT
Start: 2023-12-26 | End: 2024-02-29

## 2024-01-15 ENCOUNTER — PATIENT OUTREACH (OUTPATIENT)
Dept: CARE COORDINATION | Facility: CLINIC | Age: 89
End: 2024-01-15
Payer: COMMERCIAL

## 2024-01-29 ENCOUNTER — PATIENT OUTREACH (OUTPATIENT)
Dept: CARE COORDINATION | Facility: CLINIC | Age: 89
End: 2024-01-29
Payer: COMMERCIAL

## 2024-02-09 NOTE — TELEPHONE ENCOUNTER
"Requested Prescriptions   Pending Prescriptions Disp Refills     amLODIPine (NORVASC) 5 MG tablet [Pharmacy Med Name: AMLODIPINE BESYLATE TABS 5MG]  Last Written Prescription Date:  9/07/2017  Last Fill Quantity: 90,  # refills: 2   Last office visit: 9/14/2017 with prescribing provider:  9/14/2017   Future Office Visit:     90 tablet 2     Sig: TAKE 1 TABLET DAILY    Calcium Channel Blockers Protocol  Passed    6/3/2018 11:33 PM       Passed - Blood pressure under 140/90 in past 12 months    BP Readings from Last 3 Encounters:   09/14/17 133/77   06/15/17 134/76   09/29/16 122/74                Passed - Recent (12 mo) or future (30 days) visit within the authorizing provider's specialty    Patient had office visit in the last 12 months or has a visit in the next 30 days with authorizing provider or within the authorizing provider's specialty.  See \"Patient Info\" tab in inbasket, or \"Choose Columns\" in Meds & Orders section of the refill encounter.           Passed - Patient is age 18 or older       Passed - Normal serum creatinine on file in past 12 months    Recent Labs   Lab Test  09/15/17   0917   CR  1.06               "
Prescription approved per [facility] Refill Protocol.    
[de-identified] : US hip were obtained from [facility] imaging on 2023 and independently reviewed today reports were suboptimal and unable to get sufficient information due to casting.  2/08/24:  AP/frog-leg lateral radiographs of bilateral hips were obtained and independently reviewed during today's visit.  Bilateral femoral heads are well-seated within the acetabuli.  Bilateral Shenton's line is intact.  Bilateral femoral ossification centers are present and symmetric.

## 2024-02-28 DIAGNOSIS — E78.5 HYPERLIPIDEMIA LDL GOAL <100: ICD-10-CM

## 2024-02-28 DIAGNOSIS — I63.81 LACUNAR INFARCTION (H): ICD-10-CM

## 2024-02-29 RX ORDER — SIMVASTATIN 20 MG
TABLET ORAL
Qty: 90 TABLET | Refills: 0 | Status: SHIPPED | OUTPATIENT
Start: 2024-02-29 | End: 2024-05-02

## 2024-03-17 ENCOUNTER — HEALTH MAINTENANCE LETTER (OUTPATIENT)
Age: 89
End: 2024-03-17

## 2024-03-20 ENCOUNTER — OFFICE VISIT (OUTPATIENT)
Dept: INTERNAL MEDICINE | Facility: CLINIC | Age: 89
End: 2024-03-20
Payer: COMMERCIAL

## 2024-03-20 VITALS
BODY MASS INDEX: 29.89 KG/M2 | HEART RATE: 78 BPM | DIASTOLIC BLOOD PRESSURE: 66 MMHG | HEIGHT: 70 IN | SYSTOLIC BLOOD PRESSURE: 116 MMHG | TEMPERATURE: 97 F | OXYGEN SATURATION: 98 % | RESPIRATION RATE: 21 BRPM | WEIGHT: 208.8 LBS

## 2024-03-20 DIAGNOSIS — E03.9 HYPOTHYROIDISM, UNSPECIFIED TYPE: ICD-10-CM

## 2024-03-20 DIAGNOSIS — I10 ESSENTIAL HYPERTENSION, BENIGN: ICD-10-CM

## 2024-03-20 DIAGNOSIS — E78.5 HYPERLIPIDEMIA LDL GOAL <100: ICD-10-CM

## 2024-03-20 DIAGNOSIS — Z85.46 HISTORY OF PROSTATE CANCER: ICD-10-CM

## 2024-03-20 DIAGNOSIS — I63.81 LACUNAR INFARCTION (H): ICD-10-CM

## 2024-03-20 DIAGNOSIS — R26.89 BALANCE PROBLEMS: ICD-10-CM

## 2024-03-20 DIAGNOSIS — Z00.00 ENCOUNTER FOR SUBSEQUENT ANNUAL WELLNESS VISIT IN MEDICARE PATIENT: Primary | ICD-10-CM

## 2024-03-20 PROCEDURE — G0439 PPPS, SUBSEQ VISIT: HCPCS | Performed by: INTERNAL MEDICINE

## 2024-03-20 PROCEDURE — 99214 OFFICE O/P EST MOD 30 MIN: CPT | Mod: 25 | Performed by: INTERNAL MEDICINE

## 2024-03-20 SDOH — HEALTH STABILITY: PHYSICAL HEALTH: ON AVERAGE, HOW MANY DAYS PER WEEK DO YOU ENGAGE IN MODERATE TO STRENUOUS EXERCISE (LIKE A BRISK WALK)?: 3 DAYS

## 2024-03-20 ASSESSMENT — SOCIAL DETERMINANTS OF HEALTH (SDOH): HOW OFTEN DO YOU GET TOGETHER WITH FRIENDS OR RELATIVES?: MORE THAN THREE TIMES A WEEK

## 2024-03-20 NOTE — PROGRESS NOTES
Preventive Care Visit  [facility]  Colton Chris MD, Internal Medicine  Mar 20, 2024      Assessment & Plan     Encounter for subsequent annual wellness visit in Medicare patient  Discussed and recommended routine vaccinations as part of update  - Comprehensive metabolic panel; Future  - CBC with platelets; Future    Lacunar infarction (H)  Noted as prior history and continuing with risk reduction therapy    Hyperlipidemia LDL goal <100  As ordered as fasting per routine  - Lipid panel reflex to direct LDL Non-fasting; Future    Essential hypertension, benign  Stable on therapy and continue with medical management  - Comprehensive metabolic panel; Future    Hypothyroidism, unspecified type  TSH   Date Value Ref Range Status   10/07/2023 2.79 0.30 - 4.20 uIU/mL Final   10/19/2022 3.37 0.40 - 4.00 mU/L Final   08/17/2020 3.93 0.40 - 4.00 mU/L Final     Thyroid function panel stable and recommend annual    History of prostate cancer  Noted as prior history.    Balance problems  Discussed with patient.  Suggest may benefit from physical therapy.  Check B12 folate  - Physical Therapy  Referral; Future  - OFFICE/OUTPT VISIT,EST,LEVL III  - Vitamin B12; Future  - Folate; Future        Counseling  Appropriate preventive services were discussed with this patient, including applicable screening as appropriate for fall prevention, nutrition, physical activity, Tobacco-use cessation, weight loss and cognition.  Checklist reviewing preventive services available has been given to the patient.  Reviewed patient's diet, addressing concerns and/or questions.   He is at risk for lack of exercise and has been provided with information to increase physical activity for the benefit of his well-being.   Addressed any concerns about safety while driving.  The patient was provided with written information regarding signs of hearing loss.   Information on urinary incontinence and treatment options given to  patient.       Work on weight loss  Regular exercise    Maty Del Castillo is a 88 year old, presenting for the following:  Wellness Visit      Health Care Directive  Patient has a Health Care Directive on file  Advance care planning document is on file and is current.    HPI    Concerns:  Worsening balance   Weight gain discussed.        3/20/2024   General Health   How would you rate your overall physical health? Good   Feel stress (tense, anxious, or unable to sleep) To some extent   (!) STRESS CONCERN      3/20/2024   Nutrition   Diet: Regular (no restrictions)         3/20/2024   Exercise   Days per week of moderate/strenous exercise 3 days         3/20/2024   Social Factors   Frequency of gathering with friends or relatives More than three times a week   Worry food won't last until get money to buy more No   Food not last or not have enough money for food? Patient declined   Do you have housing?  Yes   Are you worried about losing your housing? No   Lack of transportation? No   Unable to get utilities (heat,electricity)? No         3/20/2024   Activities of Daily Living- Home Safety   Needs help with the following daily activites None of the above   Safety concerns in the home None of the above         3/20/2024   Dental   Dentist two times every year? Yes         3/20/2024   Hearing Screening   Hearing concerns? (!) I FEEL THAT PEOPLE ARE MUMBLING OR NOT SPEAKING CLEARLY.    None of the above         3/20/2024   Driving Risk Screening   Patient/family members have concerns about driving (!) YES          3/20/2024   General Alertness/Fatigue Screening   Have you been more tired than usual lately? No         3/20/2024   Urinary Incontinence Screening   Bothered by leaking urine in past 6 months Yes         3/20/2024   TB Screening   Were you born outside of the [place]? No         Today's PHQ-2 Score:       3/20/2024     1:11 PM   PHQ-2 ( 1999 [employer])   Q1: Little interest or pleasure in doing things 0   Q2:  Feeling down, depressed or hopeless 1   PHQ-2 Score 1   Q1: Little interest or pleasure in doing things Not at all   Q2: Feeling down, depressed or hopeless Several days   PHQ-2 Score 1           3/20/2024   Substance Use   Alcohol more than 3/day or more than 7/wk No   Do you have a current opioid prescription? No   How severe/bad is pain from 1 to 10? 0/10 (No Pain)   Do you use any other substances recreationally? No    (!) DECLINE     Social History     Tobacco Use    Smoking status: Never    Smokeless tobacco: Never   Vaping Use    Vaping Use: Never used   Substance Use Topics    Alcohol use: Yes    Drug use: No       Reviewed and updated as needed this visit by Provider                    Lab work is in process  Current providers sharing in care for this patient include:  Patient Care Team:  Colton Chris MD as PCP - General  Colton Chris MD as Assigned PCP  Sandee Butt PA-C as Physician Assistant (Dermatology)  Jah Peters MD as MD (Dermatology)  Jah Peters MD as Assigned Surgical Provider    The following health maintenance items are reviewed in Epic and correct as of today:  Health Maintenance   Topic Date Due    IPV IMMUNIZATION (2 of 3 - Adult catch-up series) 07/03/2000    DTAP/TDAP/TD IMMUNIZATION (1 - Tdap) 08/27/2008    LIPID  10/19/2023    MEDICARE ANNUAL WELLNESS VISIT  02/13/2024    ANNUAL REVIEW OF HM ORDERS  02/13/2024    TSH W/FREE T4 REFLEX  10/07/2024    FALL RISK ASSESSMENT  03/20/2025    ADVANCE CARE PLANNING  02/13/2028    PHQ-2 (once per calendar year)  Completed    INFLUENZA VACCINE  Completed    Pneumococcal Vaccine: 65+ Years  Completed    ZOSTER IMMUNIZATION  Completed    RSV VACCINE (Pregnancy & 60+)  Completed    COVID-19 Vaccine  Completed    HPV IMMUNIZATION  Aged Out    MENINGITIS IMMUNIZATION  Aged Out    RSV MONOCLONAL ANTIBODY  Aged Out         Review of Systems  CONSTITUTIONAL: NEGATIVE for fever, chills, change in weight  EYES: NEGATIVE for vision  "changes or irritation  ENT/MOUTH: NEGATIVE for ear, mouth and throat problems  RESP: NEGATIVE for significant cough or SOB  CV: NEGATIVE for chest pain, palpitations or peripheral edema  GI: NEGATIVE for nausea, abdominal pain, heartburn, or change in bowel habits  : NEGATIVE for frequency, dysuria, or hematuria  MUSCULOSKELETAL: NEGATIVE for significant arthralgias or myalgia  ENDOCRINE: NEGATIVE for temperature intolerance, skin/hair changes  HEME: NEGATIVE for bleeding problems  PSYCHIATRIC: NEGATIVE for changes in mood or affect     Objective    Exam  /66   Pulse 78   Temp 97  F (36.1  C) (Temporal)   Resp 21   Ht 1.778 m (5' 10\")   Wt 94.7 kg (208 lb 12.8 oz)   SpO2 98%   BMI 29.96 kg/m     Estimated body mass index is 29.96 kg/m  as calculated from the following:    Height as of this encounter: 1.778 m (5' 10\").    Weight as of this encounter: 94.7 kg (208 lb 12.8 oz).    Physical Exam  GENERAL: alert and no distress  EYES: Eyes grossly normal to inspection, PERRL and conjunctivae and sclerae normal  HENT: ear canals and TM's normal, nose and mouth without ulcers or lesions  RESP: lungs clear to auscultation - no rales, rhonchi or wheezes  CV: regular rate and rhythm, normal S1 S2, no S3 or S4, no murmur, click or rub, no peripheral edema  ABDOMEN: soft, nontender, no hepatosplenomegaly, no masses and bowel sounds normal  MS: no gross musculoskeletal defects noted, no edema  NEURO: Speech is fluent, cranial nerves II through XII are intact.  Motor function appears grossly normal.  The gait is not ataxic although slightly wide-based  PSYCH: mentation appears normal, affect normal/bright         3/20/2024   Mini Cog   Clock Draw Score 2 Normal   3 Item Recall 1 object recalled   Mini Cog Total Score 3          Signed Electronically by: Colton Chris MD    "

## 2024-03-21 ENCOUNTER — THERAPY VISIT (OUTPATIENT)
Dept: PHYSICAL THERAPY | Facility: CLINIC | Age: 89
End: 2024-03-21
Attending: INTERNAL MEDICINE
Payer: COMMERCIAL

## 2024-03-21 ENCOUNTER — LAB (OUTPATIENT)
Dept: LAB | Facility: CLINIC | Age: 89
End: 2024-03-21
Payer: COMMERCIAL

## 2024-03-21 DIAGNOSIS — Z00.00 ENCOUNTER FOR SUBSEQUENT ANNUAL WELLNESS VISIT IN MEDICARE PATIENT: ICD-10-CM

## 2024-03-21 DIAGNOSIS — R26.89 IMPAIRED GAIT AND MOBILITY: ICD-10-CM

## 2024-03-21 DIAGNOSIS — I10 ESSENTIAL HYPERTENSION, BENIGN: ICD-10-CM

## 2024-03-21 DIAGNOSIS — R26.89 BALANCE PROBLEMS: Primary | ICD-10-CM

## 2024-03-21 DIAGNOSIS — R26.89 BALANCE PROBLEMS: ICD-10-CM

## 2024-03-21 DIAGNOSIS — E78.5 HYPERLIPIDEMIA LDL GOAL <100: ICD-10-CM

## 2024-03-21 LAB
ERYTHROCYTE [DISTWIDTH] IN BLOOD BY AUTOMATED COUNT: 11.8 % (ref 10–15)
FOLATE SERPL-MCNC: 27.8 NG/ML (ref 4.6–34.8)
HCT VFR BLD AUTO: 42.2 % (ref 40–53)
HGB BLD-MCNC: 14.4 G/DL (ref 13.3–17.7)
MCH RBC QN AUTO: 32.6 PG (ref 26.5–33)
MCHC RBC AUTO-ENTMCNC: 34.1 G/DL (ref 31.5–36.5)
MCV RBC AUTO: 96 FL (ref 78–100)
PLATELET # BLD AUTO: 122 10E3/UL (ref 150–450)
RBC # BLD AUTO: 4.42 10E6/UL (ref 4.4–5.9)
WBC # BLD AUTO: 5 10E3/UL (ref 4–11)

## 2024-03-21 PROCEDURE — 80061 LIPID PANEL: CPT

## 2024-03-21 PROCEDURE — 82607 VITAMIN B-12: CPT

## 2024-03-21 PROCEDURE — 36415 COLL VENOUS BLD VENIPUNCTURE: CPT

## 2024-03-21 PROCEDURE — 97161 PT EVAL LOW COMPLEX 20 MIN: CPT | Mod: GP

## 2024-03-21 PROCEDURE — 85027 COMPLETE CBC AUTOMATED: CPT

## 2024-03-21 PROCEDURE — 80053 COMPREHEN METABOLIC PANEL: CPT

## 2024-03-21 PROCEDURE — 97112 NEUROMUSCULAR REEDUCATION: CPT | Mod: GP

## 2024-03-21 PROCEDURE — 82746 ASSAY OF FOLIC ACID SERUM: CPT

## 2024-03-21 NOTE — PROGRESS NOTES
PHYSICAL THERAPY EVALUATION  Type of Visit: Evaluation    See electronic medical record for Abuse and Falls Screening details.    Subjective   Pt reports to OP PT d/t ongoing balance concerns. Pt has noticed over the past few years worsening of balance, ie not walking in a straight line, waddling when walking, getting out of chair or bed is unstable, navigating curbs and stairs are scary, when walking in low visual contrast is difficult as well. Doesn't use walker or cane but is not against beginning to use. When walking to the bathroom in the middle of the night always holds on to walls. Fell once when walking down stairs and forgot the last step. Also once fell out of chair when constantly trying to reach for something on the ground in front of him then toppled out of chair. Attends exercise classes in building on M, W, and F which focuses on balance and stretching. Has noticed that vision is getting worse, has glaucoma. Feet are cold but has full sensation. Will ride the bicycle in the gym sometimes.      Presenting condition or subjective complaint:    Date of onset: 03/20/24 (chronic issue, order date)    Relevant medical history:     Past Medical History:   Diagnosis Date    Basal cell carcinoma     BENIGN PROSTATIC HYPERTROPHY 9/17/2002    Essential hypertension, benign 11/8/2016    Hyperlipidemia LDL goal <100 5/29/2012    Hypertrophy (benign) of prostate     abstracted 7/5/02.    HYPOTHYROIDISM NOS 9/17/2002    Lacunar infarction (H) 9/4/2012    Unspecified hypothyroidism     abstracted 7/5/02.     Dates & types of surgery:    Past Surgical History:   Procedure Laterality Date    HC SHOULDER ARTHROSCOPY, DX      ZZC REMV PROSTATE,PERINEAL,RADICAL  2000       Prior diagnostic imaging/testing results:       Prior therapy history for the same diagnosis, illness or injury:        Prior Level of Function  Transfers: Independent  Ambulation: Independent  ADL: Independent  IADL:  IND, lives at IND living facility  (coop)    Living Environment  Social support:     Type of home:     Stairs to enter the home:         Ramp:     Stairs inside the home:         Help at home:    Equipment owned:       Employment:      Hobbies/Interests:      Patient goals for therapy:      Pain assessment:  Pain in R hip and pain in both knees - know arthritis     Objective   Cognitive Status Examination  Orientation: Oriented to person, place and time   Level of Consciousness: Alert  Follows Commands and Answers Questions: 100% of the time  Personal Safety and Judgement: Intact  Memory: Intact    OBSERVATION: Pt arrives w/o AD  POSTURE: Standing Posture: Rounded shoulders, Forward head, Lordosis increased, Thoracic kyphosis increased   PALPATION:    RANGE OF MOTION:   STRENGTH:  B hip flex 5/5, B quads 5/5, B hams 4/5, B DF 4/5, B hip abd 3+/5, B hip add 5/5    BED MOBILITY:     TRANSFERS: Independent    GAIT:   Gait Description: Pt ambulates with stooped posture and occasional path deviation, small step lengths    BALANCE:     SPECIAL TESTS  Functional Gait Assessment (FGA) TOTAL SCORE: (MAXIMUM SCORE 30): 15  (<22/30 indicates fall risk)   10 Meter Walk Test (Comfortable)  6.44 sec = 0.93 m/s   6 Minute Walk Test (6MWT)         Casey Balance Scale (BBS)    (<45/56 indicates fall risk)   5 Times Sit-to-Stand (5TSTS)  27.60 sec     Dynamic Gait Index (DGI)     (<19/24 indicates fall risk)   Timed Up and Go (TUG) - sec    Single Leg Stance Right (sec)    Single Leg Stance Left (sec)    Modified CTSIB Conditions (sec) Cond 1: 30 sec  Cond 2: 30 sec  Cond 4: 30 sec  Cond 5 : 30 sec   Romberg  (sec)    Sharpened Romberg (sec)            SENSATION:  denies N/T    REFLEXES:   COORDINATION: noted mild discoordination in L>R foot  MUSCLE TONE:       Assessment & Plan   CLINICAL IMPRESSIONS  Medical Diagnosis: Balance problems    Treatment Diagnosis: Imapired gait and mobility   Impression/Assessment: Patient is a 88 year old male with balance complaints.   The following significant findings have been identified: Pain, Decreased strength, Impaired balance, Impaired gait, Impaired posture, and Impaired vision. These impairments interfere with their ability to perform self care tasks, recreational activities, household mobility, and community mobility as compared to previous level of function.     Clinical Decision Making (Complexity):  Clinical Presentation: Stable/Uncomplicated  Clinical Presentation Rationale: based on medical and personal factors listed in PT evaluation  Clinical Decision Making (Complexity): Low complexity    PLAN OF CARE  Treatment Interventions:  Interventions: Gait Training, Manual Therapy, Neuromuscular Re-education, Therapeutic Activity, Therapeutic Exercise    Long Term Goals     PT Goal 1  Goal Identifier: Gait speed  Goal Description: Pt will be able to ambulate at pace of at least 1 m/s in order to improve safety and independence with community ambulation  Goal Progress: eval: 0.93 m/s  Target Date: 06/18/24  PT Goal 2  Goal Identifier: 5xSTS  Goal Description: Pt will perform 5x STS in <20 sec in order to improve safety and independence with transfers at home  Goal Progress: eval: 27.60 sec  Target Date: 06/18/24  PT Goal 3  Goal Identifier: FGA  Goal Description: Pt will score at least 19/30 on the FGA in order to indicate reduced risk of falls when ambulating in the community  Goal Progress: eval: 15/30  Target Date: 06/18/24  PT Goal 4  Goal Identifier: HEP  Goal Description: Pt will be independent with HEP at least 3x/week in order to improve self management of impairments  Target Date: 06/18/24      Frequency of Treatment: 1x/week  Duration of Treatment: 90 days    Recommended Referrals to Other Professionals:   Education Assessment:   Learner/Method: Patient    Risks and benefits of evaluation/treatment have been explained.   Patient/Family/caregiver agrees with Plan of Care.     Evaluation Time:     PT Eval, Low Complexity Minutes  (44970): 29       Signing Clinician: Ivelisse Saldivar, PT      [facility]                                                                                   OUTPATIENT PHYSICAL THERAPY      PLAN OF TREATMENT FOR OUTPATIENT REHABILITATION   Patient's Last Name, First Name, Abundio Saba YOB: 1935   Provider's Name   [facility]   Medical Record No.  [id number]     Onset Date: 03/20/24 (chronic issue, order date)  Start of Care Date: 03/21/24     Medical Diagnosis:  Balance problems      PT Treatment Diagnosis:  Imapired gait and mobility Plan of Treatment  Frequency/Duration: 1x/week/ 90 days    Certification date from 03/21/24 to 06/18/24         See note for plan of treatment details and functional goals     Ivelisse Saldivar, PT                         I CERTIFY THE NEED FOR THESE SERVICES FURNISHED UNDER        THIS PLAN OF TREATMENT AND WHILE UNDER MY CARE     (Physician attestation of this document indicates review and certification of the therapy plan).              Referring Provider:  Colton Chris    Initial Assessment  See Epic Evaluation- Start of Care Date: 03/21/24

## 2024-03-22 LAB
ALBUMIN SERPL BCG-MCNC: 4.2 G/DL (ref 3.5–5.2)
ALP SERPL-CCNC: 62 U/L (ref 40–150)
ALT SERPL W P-5'-P-CCNC: 13 U/L (ref 0–70)
ANION GAP SERPL CALCULATED.3IONS-SCNC: 7 MMOL/L (ref 7–15)
AST SERPL W P-5'-P-CCNC: 14 U/L (ref 0–45)
BILIRUB SERPL-MCNC: 0.7 MG/DL
BUN SERPL-MCNC: 14.8 MG/DL (ref 8–23)
CALCIUM SERPL-MCNC: 9 MG/DL (ref 8.8–10.2)
CHLORIDE SERPL-SCNC: 104 MMOL/L (ref 98–107)
CHOLEST SERPL-MCNC: 136 MG/DL
CREAT SERPL-MCNC: 1.06 MG/DL (ref 0.67–1.17)
DEPRECATED HCO3 PLAS-SCNC: 27 MMOL/L (ref 22–29)
EGFRCR SERPLBLD CKD-EPI 2021: 68 ML/MIN/1.73M2
FASTING STATUS PATIENT QL REPORTED: YES
GLUCOSE SERPL-MCNC: 106 MG/DL (ref 70–99)
HDLC SERPL-MCNC: 53 MG/DL
LDLC SERPL CALC-MCNC: 64 MG/DL
NONHDLC SERPL-MCNC: 83 MG/DL
POTASSIUM SERPL-SCNC: 4.6 MMOL/L (ref 3.4–5.3)
PROT SERPL-MCNC: 6.5 G/DL (ref 6.4–8.3)
SODIUM SERPL-SCNC: 138 MMOL/L (ref 135–145)
TRIGL SERPL-MCNC: 97 MG/DL
VIT B12 SERPL-MCNC: 1924 PG/ML (ref 232–1245)

## 2024-03-22 NOTE — PROGRESS NOTES
03/21/24 0500   Signing Clinician's Name / Credentials   Signing clinician's name / credentials Scarlet Saldivar, PT, DPT   Functional Gait Assessment (ZAHEER Jiménez, CHELSEA León, et al. (2004))   1. GAIT LEVEL SURFACE 2  (6.44 sec)   2. CHANGE IN GAIT SPEED 2  (faster pace is more unsafe)   3. GAIT WITH HORIZONTAL HEAD TURNS 2   4. GAIT WITH VERTICAL HEAD TURNS 2   5. GAIT AND PIVOT TURN 1   6. STEP OVER OBSTACLE 2   7. GAIT WITH NARROW BASE OF SUPPORT 0   8. GAIT WITH EYES CLOSED 1  (9.41 sec)   9. AMBULATING BACKWARDS 1   10. STEPS 2   Total Functional Gait Assessment Score   TOTAL SCORE: (MAXIMUM SCORE 30) 15     Functional Gait Assessment (FGA): The FGA assesses postural stability during various walking tasks.   Gait assistive device used: None    Scores of <22 /30 have been correlated with predicting falls in community-dwelling older adults according to Tino & Adarsh 2010.   Scores of <18 /30 have been correlated with increased risk for falls in patients with Parkinsons Disease according to Mundo Brown, Hinton et al 2014.  Minimal Detectable Change for patients with acute/chronic stroke = 4.2 according to Sudheer & Jorge Lschshellie 2009  Minimal Detectable Change for patients with vestibular disorder = 8 according to Tino & Adarsh 2010    Assessment (rationale for performing, application to patient s function & care plan): Pt score indicates high risk of falls and would benefit from skilled PT in order to address impairments and lower risk of falls  (Minutes billed as physical performance test):

## 2024-04-04 ENCOUNTER — THERAPY VISIT (OUTPATIENT)
Dept: PHYSICAL THERAPY | Facility: CLINIC | Age: 89
End: 2024-04-04
Payer: COMMERCIAL

## 2024-04-04 DIAGNOSIS — R26.89 BALANCE PROBLEMS: Primary | ICD-10-CM

## 2024-04-04 DIAGNOSIS — R26.89 IMPAIRED GAIT AND MOBILITY: ICD-10-CM

## 2024-04-04 PROCEDURE — 97112 NEUROMUSCULAR REEDUCATION: CPT | Mod: GP

## 2024-04-04 PROCEDURE — 97110 THERAPEUTIC EXERCISES: CPT | Mod: GP

## 2024-04-18 ENCOUNTER — THERAPY VISIT (OUTPATIENT)
Dept: PHYSICAL THERAPY | Facility: CLINIC | Age: 89
End: 2024-04-18
Payer: COMMERCIAL

## 2024-04-18 DIAGNOSIS — R26.89 BALANCE PROBLEMS: Primary | ICD-10-CM

## 2024-04-18 DIAGNOSIS — R26.89 IMPAIRED GAIT AND MOBILITY: ICD-10-CM

## 2024-04-18 PROCEDURE — 97110 THERAPEUTIC EXERCISES: CPT | Mod: GP

## 2024-04-18 PROCEDURE — 97112 NEUROMUSCULAR REEDUCATION: CPT | Mod: GP

## 2024-04-25 ENCOUNTER — THERAPY VISIT (OUTPATIENT)
Dept: PHYSICAL THERAPY | Facility: CLINIC | Age: 89
End: 2024-04-25
Payer: COMMERCIAL

## 2024-04-25 DIAGNOSIS — R26.89 BALANCE PROBLEMS: Primary | ICD-10-CM

## 2024-04-25 DIAGNOSIS — R26.89 IMPAIRED GAIT AND MOBILITY: ICD-10-CM

## 2024-04-25 PROCEDURE — 97112 NEUROMUSCULAR REEDUCATION: CPT | Mod: GP

## 2024-04-29 DIAGNOSIS — R60.9 EDEMA, UNSPECIFIED TYPE: ICD-10-CM

## 2024-04-29 DIAGNOSIS — I10 ESSENTIAL HYPERTENSION, BENIGN: ICD-10-CM

## 2024-04-30 RX ORDER — FUROSEMIDE 20 MG
TABLET ORAL
Qty: 90 TABLET | Refills: 0 | Status: SHIPPED | OUTPATIENT
Start: 2024-04-30 | End: 2024-06-25

## 2024-04-30 NOTE — TELEPHONE ENCOUNTER
Prescription approved per [facility] Refill Protocol.  Hollie Albarran, RN  [facility] Triage Nurse

## 2024-05-01 DIAGNOSIS — E78.5 HYPERLIPIDEMIA LDL GOAL <100: ICD-10-CM

## 2024-05-01 DIAGNOSIS — I63.81 LACUNAR INFARCTION (H): ICD-10-CM

## 2024-05-02 ENCOUNTER — THERAPY VISIT (OUTPATIENT)
Dept: PHYSICAL THERAPY | Facility: CLINIC | Age: 89
End: 2024-05-02
Payer: COMMERCIAL

## 2024-05-02 DIAGNOSIS — R26.89 BALANCE PROBLEMS: Primary | ICD-10-CM

## 2024-05-02 DIAGNOSIS — R26.89 IMPAIRED GAIT AND MOBILITY: ICD-10-CM

## 2024-05-02 PROCEDURE — 97112 NEUROMUSCULAR REEDUCATION: CPT | Mod: GP

## 2024-05-02 PROCEDURE — 97750 PHYSICAL PERFORMANCE TEST: CPT | Mod: GP

## 2024-05-02 RX ORDER — SIMVASTATIN 20 MG
TABLET ORAL
Qty: 90 TABLET | Refills: 2 | Status: SHIPPED | OUTPATIENT
Start: 2024-05-02 | End: 2024-08-22 | Stop reason: ALTCHOICE

## 2024-05-02 NOTE — PROGRESS NOTES
05/02/24 0500   Appointment Info   Signing clinician's name / credentials Scarlet Saldivar, PT, DPT   Visits Used 5   Medical Diagnosis Balance problems   PT Tx Diagnosis Imapired gait and mobility   Quick Adds Certification   Progress Note/Certification   Start of Care Date 03/21/24   Onset of illness/injury or Date of Surgery 03/20/24  (chronic issue, order date)   Therapy Frequency 1x/week   Predicted Duration 90 days   Certification date from 03/21/24   Certification date to 06/18/24   GOALS   PT Goals 2;3;4   PT Goal 1   Goal Identifier Gait speed   Goal Description Pt will be able to ambulate at pace of at least 1 m/s in order to improve safety and independence with community ambulation   Goal Progress eval: 0.93 m/s; MET 5/2/24: 1.15 m/s   Target Date 06/18/24   Date Met 05/02/24   PT Goal 2   Goal Identifier 5xSTS   Goal Description Pt will perform 5x STS in <20 sec in order to improve safety and independence with transfers at home   Goal Progress eval: 27.60 sec; PROGRESSING 5/2/24: 26.41 sec   Target Date 06/18/24   PT Goal 3   Goal Identifier FGA   Goal Description Pt will score at least 19/30 on the FGA in order to indicate reduced risk of falls when ambulating in the community   Goal Progress eval: 15/30; MET 5/2/24: 24/30   Target Date 06/18/24   Date Met 05/02/24   PT Goal 4   Goal Identifier HEP   Goal Description Pt will be independent with HEP at least 3x/week in order to improve self management of impairments   Goal Progress MET 5/2/24: pt is consistent with HEP and workout classes at home facility   Target Date 06/18/24   Date Met 05/02/24   Subjective Report   Subjective Report Last appt scheduled, not sure if knee muscle or joints that hurting.   Objective Measures   Objective Measures Objective Measure 1;Objective Measure 2;Objective Measure 3   Objective Measure 1   Objective Measure Gait speed   Details 5.22 sec = 1.15 m/s   Objective Measure 2   Objective Measure 5xSTS   Details 26.41 sec    Objective Measure 3   Objective Measure FGA   Details 24/30   Neuromuscular Re-education   Neuromuscular re-ed of mvmt, balance, coord, kinesthetic sense, posture, proprioception minutes (77173) 15   Neuro Re-ed 1 static balance, uneven surface balance   Neuro Re-ed 1 - Details Standing alt toe taps to 2x cones x20 reps, then progressed to 2x target cone tap ea leg, x10 reps ea foot. Tandem stance x30 sec hold ea side with fingertip assist as needed, 30 sec head turns ea side, 30 sec vertical head turn ea side. Standing on foam balance beam x30 sec, then side stpeping on foam balance beam 4x ea direction. Forward step ups onto foam balance beam x20 reps. SIde step ups to foam balance beam x10 reps ea side.   Skilled Intervention cues, guarding, update HEP   Eval/Assessments   Assessments Physical Performance Test/Measures   Physical Performance Test/measures   Physical Performance Test/Measurement, Minutes (06489) 25   Physical Performance Test/Measurement Details Reassessment of 5xSTS, FGA, gait speed. DIscussion of POC, pt agreeable to discharge   Skilled Intervention Reassessment of all goals, POC discussion   Patient Response/Progress see obejctive measures   Education   Learner/Method Patient   Plan   Home program PTRx   Updates to plan of care Discharge   Total Session Time   Timed Code Treatment Minutes 40   Total Treatment Time (sum of timed and untimed services) 40         DISCHARGE  Reason for Discharge: Patient has met all goals except strength goal. Pt is no longer considered risk of falls per FGA.  Patient chooses to discontinue therapy.    Equipment Issued:     Discharge Plan: Patient to continue home program.    Referring Provider:  Colton Chris

## 2024-05-24 DIAGNOSIS — I10 ESSENTIAL HYPERTENSION, BENIGN: ICD-10-CM

## 2024-05-28 RX ORDER — AMLODIPINE BESYLATE 5 MG/1
TABLET ORAL
Qty: 100 TABLET | Refills: 2 | OUTPATIENT
Start: 2024-05-28

## 2024-06-19 NOTE — PROGRESS NOTES
"  Assessment & Plan     Essential hypertension, benign  Stable on therapy continue with medical management.    Lacunar infarction (H)  Check labs and continue with supportive care and risk reduction therapy  - Vitamin B12; Future  - CBC with platelets; Future    Hyperlipidemia LDL goal <100  Currently on statin therapy.    Hypothyroidism, unspecified type  TSH   Date Value Ref Range Status   10/07/2023 2.79 0.30 - 4.20 uIU/mL Final   10/19/2022 3.37 0.40 - 4.00 mU/L Final   08/17/2020 3.93 0.40 - 4.00 mU/L Final   Stable at present time and continue with medical management      Hyperglycemia  Check fasting blood sugar and A1c level  - Basic metabolic panel  (Ca, Cl, CO2, Creat, Gluc, K, Na, BUN); Future  - Hemoglobin A1c; Future    Balance problems  Question mild weakness of the hip musculature due to statin use.  Discussed discontinuation versus benefit of continuing in setting of prior stroke.  Offered physical therapy.      The longitudinal plan of care for the diagnosis(es)/condition(s) as documented were addressed during this visit. Due to the added complexity in care, I will continue to support Abundio in the subsequent management and with ongoing continuity of care.    BMI  Estimated body mass index is 29.56 kg/m  as calculated from the following:    Height as of this encounter: 1.778 m (5' 10\").    Weight as of this encounter: 93.4 kg (206 lb).   Weight management plan: Discussed healthy diet and exercise guidelines      Work on weight loss  Regular exercise    Subjective   Abundio is a 88 year old, presenting for the following health issues:  Results      Follow up 3/21/24 platelet, b12 and glucose labs     History of Present Illness       Reason for visit:  Follow up    He eats 0-1 servings of fruits and vegetables daily.He consumes 0 sweetened beverage(s) daily.He exercises with enough effort to increase his heart rate 30 to 60 minutes per day.  He exercises with enough effort to increase his heart rate 3 " "or less days per week.   He is taking medications regularly.     Other concerns:  Weakness in legs and worsening balance. Patient states no improvement with physical therapy. Patient questioning if related to statin       Review of Systems  CONSTITUTIONAL: NEGATIVE for fever, chills, change in weight  INTEGUMENTARY/SKIN: NEGATIVE for worrisome rashes, moles or lesions  EYES: NEGATIVE for vision changes or irritation  ENT/MOUTH: NEGATIVE for ear, mouth and throat problems  RESP: NEGATIVE for significant cough or SOB  CV: NEGATIVE for chest pain, palpitations or peripheral edema  GI: NEGATIVE for nausea, abdominal pain, heartburn, or change in bowel habits  : NEGATIVE for frequency, dysuria, or hematuria  MUSCULOSKELETAL: NEGATIVE for significant arthralgias or myalgia  HEME: NEGATIVE for bleeding problems  PSYCHIATRIC: NEGATIVE for changes in mood or affect      Objective    /68   Pulse 57   Temp 96.9  F (36.1  C) (Temporal)   Resp 13   Ht 1.778 m (5' 10\")   Wt 93.4 kg (206 lb)   SpO2 99%   BMI 29.56 kg/m    Body mass index is 29.56 kg/m .    Physical Exam   GENERAL: alert and no distress  EYES: Eyes grossly normal to inspection, PERRL and conjunctivae and sclerae normal  HENT: ear canals and TM's normal, nose and mouth without ulcers or lesions  NECK: no adenopathy, no asymmetry, masses, or scars  RESP: lungs clear to auscultation - no rales, rhonchi or wheezes  CV: regular rate and rhythm, normal S1 S2, no S3 or S4, no murmur, click or rub  ABDOMEN: soft, nontender, no hepatosplenomegaly, no masses and bowel sounds normal  MS: no gross musculoskeletal defects noted, no edema  NEURO: No focal changes, gait slightly wide based, non-ataxic.  He is able to arise from a seated position but does need the use of his hands  PSYCH: mentation appears normal, affect normal/bright        Signed Electronically by: Colton Chris MD    "

## 2024-06-20 ENCOUNTER — OFFICE VISIT (OUTPATIENT)
Dept: INTERNAL MEDICINE | Facility: CLINIC | Age: 89
End: 2024-06-20
Payer: COMMERCIAL

## 2024-06-20 VITALS
OXYGEN SATURATION: 99 % | SYSTOLIC BLOOD PRESSURE: 106 MMHG | WEIGHT: 206 LBS | RESPIRATION RATE: 13 BRPM | DIASTOLIC BLOOD PRESSURE: 68 MMHG | BODY MASS INDEX: 29.49 KG/M2 | HEIGHT: 70 IN | TEMPERATURE: 96.9 F | HEART RATE: 57 BPM

## 2024-06-20 DIAGNOSIS — R26.89 BALANCE PROBLEMS: ICD-10-CM

## 2024-06-20 DIAGNOSIS — R73.9 HYPERGLYCEMIA: ICD-10-CM

## 2024-06-20 DIAGNOSIS — E03.9 HYPOTHYROIDISM, UNSPECIFIED TYPE: ICD-10-CM

## 2024-06-20 DIAGNOSIS — E78.5 HYPERLIPIDEMIA LDL GOAL <100: ICD-10-CM

## 2024-06-20 DIAGNOSIS — I10 ESSENTIAL HYPERTENSION, BENIGN: Primary | ICD-10-CM

## 2024-06-20 DIAGNOSIS — I63.81 LACUNAR INFARCTION (H): ICD-10-CM

## 2024-06-20 LAB
ANION GAP SERPL CALCULATED.3IONS-SCNC: 10 MMOL/L (ref 7–15)
BUN SERPL-MCNC: 14 MG/DL (ref 8–23)
CALCIUM SERPL-MCNC: 9.2 MG/DL (ref 8.8–10.2)
CHLORIDE SERPL-SCNC: 103 MMOL/L (ref 98–107)
CREAT SERPL-MCNC: 1.1 MG/DL (ref 0.67–1.17)
DEPRECATED HCO3 PLAS-SCNC: 27 MMOL/L (ref 22–29)
EGFRCR SERPLBLD CKD-EPI 2021: 65 ML/MIN/1.73M2
ERYTHROCYTE [DISTWIDTH] IN BLOOD BY AUTOMATED COUNT: 12 % (ref 10–15)
GLUCOSE SERPL-MCNC: 107 MG/DL (ref 70–99)
HBA1C MFR BLD: 5.5 % (ref 0–5.6)
HCT VFR BLD AUTO: 41.4 % (ref 40–53)
HGB BLD-MCNC: 13.9 G/DL (ref 13.3–17.7)
MCH RBC QN AUTO: 32.7 PG (ref 26.5–33)
MCHC RBC AUTO-ENTMCNC: 33.6 G/DL (ref 31.5–36.5)
MCV RBC AUTO: 97 FL (ref 78–100)
PLATELET # BLD AUTO: 115 10E3/UL (ref 150–450)
POTASSIUM SERPL-SCNC: 4.2 MMOL/L (ref 3.4–5.3)
RBC # BLD AUTO: 4.25 10E6/UL (ref 4.4–5.9)
SODIUM SERPL-SCNC: 140 MMOL/L (ref 135–145)
VIT B12 SERPL-MCNC: 2171 PG/ML (ref 232–1245)
WBC # BLD AUTO: 5.1 10E3/UL (ref 4–11)

## 2024-06-20 PROCEDURE — 99214 OFFICE O/P EST MOD 30 MIN: CPT | Performed by: INTERNAL MEDICINE

## 2024-06-20 PROCEDURE — 82607 VITAMIN B-12: CPT | Performed by: INTERNAL MEDICINE

## 2024-06-20 PROCEDURE — G2211 COMPLEX E/M VISIT ADD ON: HCPCS | Performed by: INTERNAL MEDICINE

## 2024-06-20 PROCEDURE — 36415 COLL VENOUS BLD VENIPUNCTURE: CPT | Performed by: INTERNAL MEDICINE

## 2024-06-20 PROCEDURE — 83036 HEMOGLOBIN GLYCOSYLATED A1C: CPT | Performed by: INTERNAL MEDICINE

## 2024-06-20 PROCEDURE — 85027 COMPLETE CBC AUTOMATED: CPT | Performed by: INTERNAL MEDICINE

## 2024-06-20 PROCEDURE — 80048 BASIC METABOLIC PNL TOTAL CA: CPT | Performed by: INTERNAL MEDICINE

## 2024-06-21 ENCOUNTER — TELEPHONE (OUTPATIENT)
Dept: INTERNAL MEDICINE | Facility: CLINIC | Age: 89
End: 2024-06-21
Payer: COMMERCIAL

## 2024-06-21 DIAGNOSIS — I10 ESSENTIAL HYPERTENSION, BENIGN: Primary | ICD-10-CM

## 2024-06-21 NOTE — TELEPHONE ENCOUNTER
Called and spoke with pt to relay provider note below.   He verbalized understanding.     He stated he is not taking anything that he is aware of that contains Vitamin B12.   Routing to PCP as FYI re: vit B12.   Additionally, future lab orders pended for provider review.     Assisted with scheduling follow-up visits.    Aug 01, 2024 8:30 AM  LAB with [facility]  [facility] ([facility] ) [phone]     Aug 08, 2024 1:00 PM  (Arrive by 12:40 PM)  Provider Visit with Colton Chris MD  [facility] ([facility] ) [phone]     Pt would like his results and his appointments mailed to him . Routing to team to assist with this.      Colton Chris MD  6/21/2024  6:35 AM CDT Back to Top      Please send patient copy of labs and contact patient and inform B12 level is elevated and needs to cut back if taking B12 dosing to only on weekdays and recheck level in 8 weeks.     May also inform:     Your hemoglobin and basic panel are normal.  Your platelet function tests are slightly abnormal and low and should be rechecked here in the clinic in one month with a follow-up visit with me.       Your Hemoglobin A1C and blood sugar tests look stable and I would continue with your medication without change.  These tests should be repeated in 6 months.     Thanks     Thank you,  Taryn Birmingham RN

## 2024-06-25 DIAGNOSIS — I10 ESSENTIAL HYPERTENSION, BENIGN: ICD-10-CM

## 2024-06-25 DIAGNOSIS — R60.9 EDEMA, UNSPECIFIED TYPE: ICD-10-CM

## 2024-06-25 RX ORDER — FUROSEMIDE 20 MG
TABLET ORAL
Qty: 90 TABLET | Refills: 3 | Status: SHIPPED | OUTPATIENT
Start: 2024-06-25

## 2024-07-19 DIAGNOSIS — I10 ESSENTIAL HYPERTENSION, BENIGN: ICD-10-CM

## 2024-07-22 RX ORDER — AMLODIPINE BESYLATE 5 MG/1
TABLET ORAL
Qty: 60 TABLET | Refills: 5 | Status: SHIPPED | OUTPATIENT
Start: 2024-07-22

## 2024-08-01 ENCOUNTER — LAB (OUTPATIENT)
Dept: LAB | Facility: CLINIC | Age: 89
End: 2024-08-01
Payer: COMMERCIAL

## 2024-08-01 DIAGNOSIS — I10 ESSENTIAL HYPERTENSION, BENIGN: ICD-10-CM

## 2024-08-01 LAB
ERYTHROCYTE [DISTWIDTH] IN BLOOD BY AUTOMATED COUNT: 11.8 % (ref 10–15)
HCT VFR BLD AUTO: 43.4 % (ref 40–53)
HGB BLD-MCNC: 14.8 G/DL (ref 13.3–17.7)
MCH RBC QN AUTO: 32.5 PG (ref 26.5–33)
MCHC RBC AUTO-ENTMCNC: 34.1 G/DL (ref 31.5–36.5)
MCV RBC AUTO: 95 FL (ref 78–100)
PLATELET # BLD AUTO: 129 10E3/UL (ref 150–450)
RBC # BLD AUTO: 4.56 10E6/UL (ref 4.4–5.9)
VIT B12 SERPL-MCNC: 2081 PG/ML (ref 232–1245)
WBC # BLD AUTO: 5.7 10E3/UL (ref 4–11)

## 2024-08-01 PROCEDURE — 85027 COMPLETE CBC AUTOMATED: CPT

## 2024-08-01 PROCEDURE — 36415 COLL VENOUS BLD VENIPUNCTURE: CPT

## 2024-08-01 PROCEDURE — 82607 VITAMIN B-12: CPT

## 2024-08-22 ENCOUNTER — OFFICE VISIT (OUTPATIENT)
Dept: INTERNAL MEDICINE | Facility: CLINIC | Age: 89
End: 2024-08-22
Payer: COMMERCIAL

## 2024-08-22 VITALS
HEIGHT: 70 IN | HEART RATE: 62 BPM | BODY MASS INDEX: 29.35 KG/M2 | WEIGHT: 205 LBS | TEMPERATURE: 97.6 F | DIASTOLIC BLOOD PRESSURE: 66 MMHG | OXYGEN SATURATION: 99 % | SYSTOLIC BLOOD PRESSURE: 118 MMHG | RESPIRATION RATE: 13 BRPM

## 2024-08-22 DIAGNOSIS — I63.81 LACUNAR INFARCTION (H): ICD-10-CM

## 2024-08-22 DIAGNOSIS — M25.569 CHRONIC KNEE PAIN, UNSPECIFIED LATERALITY: ICD-10-CM

## 2024-08-22 DIAGNOSIS — G89.29 CHRONIC KNEE PAIN, UNSPECIFIED LATERALITY: ICD-10-CM

## 2024-08-22 DIAGNOSIS — E78.5 HYPERLIPIDEMIA LDL GOAL <100: ICD-10-CM

## 2024-08-22 DIAGNOSIS — I10 ESSENTIAL HYPERTENSION, BENIGN: Primary | ICD-10-CM

## 2024-08-22 PROCEDURE — G2211 COMPLEX E/M VISIT ADD ON: HCPCS | Performed by: INTERNAL MEDICINE

## 2024-08-22 PROCEDURE — 99214 OFFICE O/P EST MOD 30 MIN: CPT | Performed by: INTERNAL MEDICINE

## 2024-08-22 RX ORDER — PRAVASTATIN SODIUM 20 MG
20 TABLET ORAL DAILY
Qty: 90 TABLET | Refills: 1 | Status: SHIPPED | OUTPATIENT
Start: 2024-08-22

## 2024-08-22 NOTE — PROGRESS NOTES
Assessment & Plan     Essential hypertension, benign  Stable on therapy continue with current medical management.    Chronic knee pain, unspecified laterality  Referral placed for patient to determine whether or not steroid injection is appropriate  - Orthopedic  Referral; Future    Lacunar infarction (H)  Suggest stopping simvastatin due to symptoms, trial of pravastatin recommended.  Recheck lipid panel in 3 months  - pravastatin (PRAVACHOL) 20 MG tablet; Take 1 tablet (20 mg) by mouth daily.  - Lipid Profile; Future    Hyperlipidemia LDL goal <100  Discussed dosing and side effects.  - pravastatin (PRAVACHOL) 20 MG tablet; Take 1 tablet (20 mg) by mouth daily.  - Hepatic function panel; Future  - Lipid Profile; Future      Patient's B12 level and platelet counts.  We will observe clinically.  Patient informs me he takes no supplemental B12  Has been updated vaccinations.    The longitudinal plan of care for the diagnosis(es)/condition(s) as documented were addressed during this visit. Due to the added complexity in care, I will continue to support Abundio in the subsequent management and with ongoing continuity of care.    See Patient Instructions    Maty Del Castillo is a 88 year old, presenting for the following health issues:  Results and Knee Pain    Follow up platelet function, b12 and glucose     History of Present Illness       Hypertension: He presents for follow up of hypertension.  He does not check blood pressure  regularly outside of the clinic. Outpatient blood pressures have not been over 140/90. He does not follow a low salt diet.     He eats 0-1 servings of fruits and vegetables daily.He consumes 0 sweetened beverage(s) daily.He exercises with enough effort to increase his heart rate 10 to 19 minutes per day.  He exercises with enough effort to increase his heart rate 4 days per week.   He is taking medications regularly.     Musculoskeletal problem/pain    Duration: 1-2 months    Description  Location: bilateral knee   Intensity:  moderate  Accompanying signs and symptoms: none  History  Previous similar problem: YES  Previous evaluation:  orthopedic evaluation in 2021   Precipitating or alleviating factors:  Trauma or overuse: no   Aggravating factors include: transitioning from sitting to standing, prolonged walking   Therapies tried and outcome: Holding statin, no change.  Previously did PT exercises to help with balance          Review of Systems  CONSTITUTIONAL: NEGATIVE for fever, chills, change in weight  EYES: NEGATIVE for vision changes or irritation  ENT/MOUTH: NEGATIVE for ear, mouth and throat problems  RESP: NEGATIVE for significant cough or SOB  CV: NEGATIVE for chest pain, palpitations or peripheral edema  GI: NEGATIVE for nausea, abdominal pain, heartburn, or change in bowel habits  : NEGATIVE for frequency, dysuria, or hematuria  NEURO: NEGATIVE for weakness, dizziness or paresthesias  ENDOCRINE: NEGATIVE for temperature intolerance, skin/hair changes  HEME: NEGATIVE for bleeding problems  PSYCHIATRIC: NEGATIVE for changes in mood or affect    Current Outpatient Medications   Medication Sig Dispense Refill    amLODIPine (NORVASC) 5 MG tablet TAKE 1 TABLET BY MOUTH DAILY 60 tablet 5    aspirin (ASA) 325 MG EC tablet Take 1 tablet (325 mg) by mouth daily 100 tablet 3    CENTRUM SILVER OR TABS 1 TABLET DAILY      furosemide (LASIX) 20 MG tablet TAKE 1 TABLET BY MOUTH DAILY AS  NEEDED FOR EDEMA 90 tablet 3    levothyroxine (SYNTHROID/LEVOTHROID) 137 MCG tablet Take 1 tablet (137 mcg) by mouth daily 90 tablet 3    timolol maleate (TIMOPTIC) 0.5 % ophthalmic solution Place 1 drop into both eyes 2 times daily      simvastatin (ZOCOR) 20 MG tablet TAKE 1 TABLET BY MOUTH AT  BEDTIME (Patient not taking: Reported on 8/22/2024) 90 tablet 2     No current facility-administered medications for this visit.           Objective    /66   Pulse 62   Temp 97.6  F (36.4  C) (Oral)   " Resp 13   Ht 1.778 m (5' 10\")   Wt 93 kg (205 lb)   SpO2 99%   BMI 29.41 kg/m    Body mass index is 29.41 kg/m .    Physical Exam   GENERAL: alert and no distress  EYES: Eyes grossly normal to inspection, PERRL and conjunctivae and sclerae normal  HENT: ear canals and TM's normal, nose and mouth without ulcers or lesions  RESP: lungs clear to auscultation - no rales, rhonchi or wheezes  CV: regular rate and rhythm, normal S1 S2, no S3 or S4, no murmur, click or rub, no peripheral edema  MS: no gross musculoskeletal defects noted, no edema  NEURO: No focal changes  PSYCH: mentation appears normal, affect normal/bright    Component      Latest Ref Rng 6/20/2024  9:06 AM 8/1/2024  8:29 AM   Sodium      135 - 145 mmol/L 140     Potassium      3.4 - 5.3 mmol/L 4.2     Chloride      98 - 107 mmol/L 103     Carbon Dioxide (CO2)      22 - 29 mmol/L 27     Anion Gap      7 - 15 mmol/L 10     Urea Nitrogen      8.0 - 23.0 mg/dL 14.0     Creatinine      0.67 - 1.17 mg/dL 1.10     GFR Estimate      >60 mL/min/1.73m2 65     Calcium      8.8 - 10.2 mg/dL 9.2     Glucose      70 - 99 mg/dL 107 (H)     WBC      4.0 - 11.0 10e3/uL 5.1  5.7    RBC Count      4.40 - 5.90 10e6/uL 4.25 (L)  4.56    Hemoglobin      13.3 - 17.7 g/dL 13.9  14.8    Hematocrit      40.0 - 53.0 % 41.4  43.4    MCV      78 - 100 fL 97  95    MCH      26.5 - 33.0 pg 32.7  32.5    MCHC      31.5 - 36.5 g/dL 33.6  34.1    RDW      10.0 - 15.0 % 12.0  11.8    Platelet Count      150 - 450 10e3/uL 115 (L)  129 (L)    Hemoglobin A1C      0.0 - 5.6 % 5.5     Vitamin B12      232 - 1,245 pg/mL 2,171 (H)  2,081 (H)               Signed Electronically by: Colton Chris MD    "

## 2024-09-10 ENCOUNTER — NURSE TRIAGE (OUTPATIENT)
Dept: INTERNAL MEDICINE | Facility: CLINIC | Age: 89
End: 2024-09-10

## 2024-09-10 ENCOUNTER — VIRTUAL VISIT (OUTPATIENT)
Dept: INTERNAL MEDICINE | Facility: CLINIC | Age: 89
End: 2024-09-10
Payer: COMMERCIAL

## 2024-09-10 DIAGNOSIS — U07.1 INFECTION DUE TO 2019 NOVEL CORONAVIRUS: Primary | ICD-10-CM

## 2024-09-10 DIAGNOSIS — I10 ESSENTIAL HYPERTENSION, BENIGN: ICD-10-CM

## 2024-09-10 PROCEDURE — 99441 PR PHYSICIAN TELEPHONE EVALUATION 5-10 MIN: CPT | Mod: 93 | Performed by: PHYSICIAN ASSISTANT

## 2024-09-10 NOTE — PROGRESS NOTES
"Abundio is a 88 year old who is being evaluated via a billable telephone visit.    What phone number would you like to be contacted at? [phone]  How would you like to obtain your AVS? No need to send AVS  Originating Location (pt. Location): Home    Distant Location (provider location):  On-site    Assessment & Plan     Infection due to 2019 novel coronavirus    - nirmatrelvir and ritonavir (PAXLOVID) 300 mg/100 mg therapy pack; Take 3 tablets by mouth 2 times daily for 5 days. (Take 2 Nirmatrelvir tablets and 1 Ritonavir tablet twice daily for 5 days)    Essential hypertension, benign              COVID-19 positive patient.  Encounter for consideration of medication intervention. Patient does qualify for a prescription. Full discussion with patient including medication options, risks and benefits. Potential drug interactions reviewed with patient.     Treatment Planned  Paxlovid    Temporary change to home medications:   Patient Instructions   Amlodipine - take 2.5 mg daily - ( 1/2 tab of 5 mg ) while on Paxlovid for 5 days.  DO not take cholesterol medication pravastatin for the 5 days while on Paxlovid      Estimated body mass index is 29.41 kg/m  as calculated from the following:    Height as of 8/22/24: 1.778 m (5' 10\").    Weight as of 8/22/24: 93 kg (205 lb).  GFR Estimate   Date Value Ref Range Status   06/20/2024 65 >60 mL/min/1.73m2 Final     Comment:     eGFR calculated using 2021 CKD-EPI equation.   12/04/2020 64 >60 mL/min/[1.73_m2] Final     Comment:     Non  GFR Calc  Starting 12/18/2018, serum creatinine based estimated GFR (eGFR) will be   calculated using the Chronic Kidney Disease Epidemiology Collaboration   (CKD-EPI) equation.       Lab Results   Component Value Date    [id number] Negative 10/24/2022       Maty Del Castillo is a 88 year old, presenting for the following health issues:  Covid Concern    HPI       COVID-19 Symptom Review  How many days ago did these symptoms " start? X4 days ago, tested positive for covid 9/9/2024    Are any of the following symptoms significant for you?  New or worsening difficulty breathing? No  Worsening cough? Yes, it's a dry cough. Runny nose  Fever or chills? No  Headache: No  Sore throat: YES  Chest pain: No  Diarrhea: No  Body aches? YES- Tired    What treatments has patient tried? none   Does patient live in a nursing home, group home, or shelter?   Does patient have a way to get food/medications during quarantined? Yes, I have a friend or family member who can help me.    Patient has had Covid in the past and has done treatment with paxlovid before  Also has questions about when he should get flu and covid vaccinations                     Objective           Vitals:  No vitals were obtained today due to virtual visit.    Physical Exam   General: Alert and no distress //Respiratory: No audible wheeze, cough, or shortness of breath // Psychiatric:  Appropriate affect, tone, and pace of words            Phone call duration: 9 minutes  Signed Electronically by: Hope Nagel PA-C

## 2024-09-10 NOTE — PATIENT INSTRUCTIONS
Amlodipine - take 2.5 mg daily - ( 1/2 tab of 5 mg ) while on Paxlovid for 5 days.  DO not take cholesterol medication pravastatin for the 5 days while on Paxlovid

## 2024-09-10 NOTE — TELEPHONE ENCOUNTER
RN triaged and pt does not need higher level of care. Scheduled for VV for Paxlovid as pt is on Medication of List 2 on RN Paxlovid Protocol.    Pt asked if he should get covid shot now or wait a week. Gave pt recommendation to wait 3 months before updated Covid shot. Can receive flu shot anytime. Pt states understanding.     Reason for Disposition   HIGH RISK patient (e.g., weak immune system, age > 64 years, obesity with BMI of 30 or higher, pregnant, chronic lung disease or other chronic medical condition) and COVID symptoms (e.g., cough, fever)  (Exceptions: Already seen by doctor or NP/PA and no new or worsening symptoms.)    Additional Information   Negative: SEVERE difficulty breathing (e.g., struggling for each breath, speaks in single words)   Negative: Difficult to awaken or acting confused (e.g., disoriented, slurred speech)   Negative: Bluish (or gray) lips or face now   Negative: Shock suspected (e.g., cold/pale/clammy skin, too weak to stand, low BP, rapid pulse)   Negative: Sounds like a life-threatening emergency to the triager   Negative: Diagnosed or suspected COVID-19 and symptoms lasting 3 or more weeks   Negative: COVID-19 exposure and no symptoms   Negative: COVID-19 vaccine reaction suspected (e.g., fever, headache, muscle aches) occurring 1 to 3 days after getting vaccine   Negative: COVID-19 vaccine, questions about   Negative: Lives with someone known to have influenza (flu test positive) and flu-like symptoms (e.g., cough, runny nose, sore throat, SOB; with or without fever)   Negative: Possible COVID-19 symptoms and triager concerned about severity of symptoms or other causes   Negative: COVID-19 and breastfeeding, questions about   Negative: SEVERE or constant chest pain or pressure  (Exception: Mild central chest pain, present only when coughing.)   Negative: MODERATE difficulty breathing (e.g., speaks in phrases, SOB even at rest, pulse 100-120)   Negative: Headache and stiff neck  "(can't touch chin to chest)   Negative: Oxygen level (e.g., pulse oximetry) 90% or lower   Negative: Chest pain or pressure  (Exception: MILD central chest pain, present only when coughing.)   Negative: Drinking very little and dehydration suspected (e.g., no urine > 12 hours, very dry mouth, very lightheaded)   Negative: Patient sounds very sick or weak to the triager   Negative: Fever > 101 F (38.3 C) and over 60 years of age   Negative: Fever > 100.0 F (37.8 C) and bedridden (e.g., CVA, chronic illness, recovering from surgery)   Negative: MILD difficulty breathing (e.g., minimal/no SOB at rest, SOB with walking, pulse <100)   Negative: Fever > 103 F (39.4 C)    Answer Assessment - Initial Assessment Questions  1. COVID-19 DIAGNOSIS: \"How do you know that you have COVID?\" (e.g., positive lab test or self-test, diagnosed by doctor or NP/PA, symptoms after exposure).      Positive home test yesterday 9/9/24  2. COVID-19 EXPOSURE: \"Was there any known exposure to COVID before the symptoms began?\" CDC Definition of close contact: within 6 feet (2 meters) for a total of 15 minutes or more over a 24-hour period.       No, was traveling in a bus with a lot of people coughing  3. ONSET: \"When did the COVID-19 symptoms start?\"       9/6/24   4. WORST SYMPTOM: \"What is your worst symptom?\" (e.g., cough, fever, shortness of breath, muscle aches)      Sore throat  5. COUGH: \"Do you have a cough?\" If Yes, ask: \"How bad is the cough?\"        Yes -- not bad, throat is very sore  6. FEVER: \"Do you have a fever?\" If Yes, ask: \"What is your temperature, how was it measured, and when did it start?\"      unknown  7. RESPIRATORY STATUS: \"Describe your breathing?\" (e.g., normal; shortness of breath, wheezing, unable to speak)       normal  8. BETTER-SAME-WORSE: \"Are you getting better, staying the same or getting worse compared to yesterday?\"  If getting worse, ask, \"In what way?\"      Unsure, maybe getting better but maybe staying " "the same  9. OTHER SYMPTOMS: \"Do you have any other symptoms?\"  (e.g., chills, fatigue, headache, loss of smell or taste, muscle pain, sore throat)      Sore throat, cough, fatigue, runny nose  10. HIGH RISK DISEASE: \"Do you have any chronic medical problems?\" (e.g., asthma, heart or lung disease, weak immune system, obesity, etc.)        HTN  11. VACCINE: \"Have you had the COVID-19 vaccine?\" If Yes, ask: \"Which one, how many shots, when did you get it?\"        Yes, \"a long time ago\"  12. PREGNANCY: \"Is there any chance you are pregnant?\" \"When was your last menstrual period?\"        No  13. O2 SATURATION MONITOR:  \"Do you use an oxygen saturation monitor (pulse oximeter) at home?\" If Yes, ask \"What is your reading (oxygen level) today?\" \"What is your usual oxygen saturation reading?\" (e.g., 95%)        N/a    Protocols used: Coronavirus (COVID-19) Diagnosed or [id number]  Anya Gonzalez RN    "

## 2024-10-06 DIAGNOSIS — E03.9 HYPOTHYROIDISM, UNSPECIFIED TYPE: ICD-10-CM

## 2024-10-07 RX ORDER — LEVOTHYROXINE SODIUM 137 UG/1
137 TABLET ORAL DAILY
Qty: 100 TABLET | Refills: 0 | Status: SHIPPED | OUTPATIENT
Start: 2024-10-07

## 2024-10-21 ENCOUNTER — OFFICE VISIT (OUTPATIENT)
Dept: DERMATOLOGY | Facility: CLINIC | Age: 89
End: 2024-10-21
Payer: COMMERCIAL

## 2024-10-21 DIAGNOSIS — B35.4 TINEA CORPORIS: ICD-10-CM

## 2024-10-21 DIAGNOSIS — L82.0 INFLAMED SEBORRHEIC KERATOSIS: Primary | ICD-10-CM

## 2024-10-21 DIAGNOSIS — L81.4 LENTIGINES: ICD-10-CM

## 2024-10-21 DIAGNOSIS — L57.0 ACTINIC KERATOSIS: ICD-10-CM

## 2024-10-21 DIAGNOSIS — Z85.828 HISTORY OF BASAL CELL CARCINOMA: ICD-10-CM

## 2024-10-21 DIAGNOSIS — L57.8 ACTINIC SKIN DAMAGE: ICD-10-CM

## 2024-10-21 PROCEDURE — 17003 DESTRUCT PREMALG LES 2-14: CPT | Mod: XS | Performed by: STUDENT IN AN ORGANIZED HEALTH CARE EDUCATION/TRAINING PROGRAM

## 2024-10-21 PROCEDURE — 99214 OFFICE O/P EST MOD 30 MIN: CPT | Mod: 25 | Performed by: STUDENT IN AN ORGANIZED HEALTH CARE EDUCATION/TRAINING PROGRAM

## 2024-10-21 PROCEDURE — 17110 DESTRUCTION B9 LES UP TO 14: CPT | Performed by: STUDENT IN AN ORGANIZED HEALTH CARE EDUCATION/TRAINING PROGRAM

## 2024-10-21 PROCEDURE — 17000 DESTRUCT PREMALG LESION: CPT | Mod: XS | Performed by: STUDENT IN AN ORGANIZED HEALTH CARE EDUCATION/TRAINING PROGRAM

## 2024-10-21 RX ORDER — KETOCONAZOLE 20 MG/G
CREAM TOPICAL DAILY
Qty: 30 G | Refills: 3 | Status: SHIPPED | OUTPATIENT
Start: 2024-10-21

## 2024-10-21 NOTE — LETTER
10/21/2024      Abundio Beckett  [address]  [place] [address]      Dear Colleague,    Thank you for referring your patient, Abundio Beckett, to the [facility]. Please see a copy of my visit note below.    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    Diagnoses and all orders for this visit:    Inflamed seborrheic keratosis  -     DESTRUCT BENIGN LESION, UP TO 14  - see procedure note    Actinic keratosis  -     DESTRUCT PREMALIGNANT LESION, FIRST  -     Cancel: DESTRUCT PREMALIGNANT LESION, 2-14  -     ME DESTRUCT PREMALIGNANT LESION, 2-14 [[id number]]  - see procedure note    History of basal cell carcinoma  - No obvious evidence of recurrence at site of previous malignancy    Lentigines  Actinic skin damage  - Reviewed the compounding benefits of incremental changes to sun protective clothing behaviors including increased frequency of sunscreen and sun protective clothing like broad brimmed hats and longsleeved UPF containing clothing    Tinea corporis  -     ketoconazole (NIZORAL) 2 % external cream; Apply topically daily.  - R thigh, peripheral leading scaling w/ central clearing  - apply daily       Cryotherapy procedure note: After verbal consent and discussion of risks and benefits including but no limited to dyspigmentation/scar, blister, and pain, 10 aks and 7 ISKs on face and back respectively was(were) treated with 1-2mm freeze border for 2 cycles with liquid nitrogen. Post cryotherapy instructions were provided.     Follow-up in 1 year .       Staff Involved:  Staff Only    Jah Peters MD   of Dermatology  Department of Dermatology  [facility]      CC:   Chief Complaint   Patient  presents with     Skin Check       History of Present Illness:  Mr. Abundio Beckett is a 88 year old male who presents as a return patient.    Last seen 08/2023, we froze aks and Kerline.  He presents today for concerns about itchy spots on his trunk as well as crusted spots on his face.  Additionally has a rash on his right thigh that is been present for several weeks and seems to be enlarging    Labs:      Physical exam:  Vitals: There were no vitals taken for this visit.  GEN: well developed, well-nourished, in no acute distress, in a pleasant mood.     SKIN: Prieto phototype 1  - Full skin, which includes the head/face, both arms, chest, back, abdomen,both legs, genitalia and/or groin buttocks, digits and/or nails, was examined.  - Flat brown macules and patches in a sun exposed areas on face and extremities  - Stuck on brown papules on trunk and extremities   - No obvious evidence of recurrence at site of previous malignancy  - gritty pink papules on face  - annular scaly rash R thigh   - No other lesions of concern on areas examined.     Past Medical History:   Past Medical History:   Diagnosis Date     Basal cell carcinoma      BENIGN PROSTATIC HYPERTROPHY 9/17/2002     Essential hypertension, benign 11/8/2016     Hyperlipidemia LDL goal <100 5/29/2012     Hypertrophy (benign) of prostate     abstracted 7/5/02.     HYPOTHYROIDISM NOS 9/17/2002     Lacunar infarction (H) 9/4/2012     Unspecified hypothyroidism     abstracted 7/5/02.     Past Surgical History:   Procedure Laterality Date     HC SHOULDER ARTHROSCOPY, DX       ZZC REMV PROSTATE,PERINEAL,RADICAL  2000       Social History:   reports that he has never smoked. He has never used smokeless tobacco. He reports current alcohol use. He reports that he does not use drugs.    Family History:  History reviewed. No pertinent family history.    Medications:  Current Outpatient Medications   Medication Sig Dispense Refill     amLODIPine (NORVASC) 5 MG  tablet TAKE 1 TABLET BY MOUTH DAILY 60 tablet 5     aspirin (ASA) 325 MG EC tablet Take 1 tablet (325 mg) by mouth daily 100 tablet 3     CENTRUM SILVER OR TABS 1 TABLET DAILY       furosemide (LASIX) 20 MG tablet TAKE 1 TABLET BY MOUTH DAILY AS  NEEDED FOR EDEMA 90 tablet 3     ketoconazole (NIZORAL) 2 % external cream Apply topically daily. 30 g 3     levothyroxine (SYNTHROID/LEVOTHROID) 137 MCG tablet TAKE 1 TABLET BY MOUTH DAILY 100 tablet 0     pravastatin (PRAVACHOL) 20 MG tablet Take 1 tablet (20 mg) by mouth daily. 90 tablet 1     timolol maleate (TIMOPTIC) 0.5 % ophthalmic solution Place 1 drop into both eyes 2 times daily       Allergies   Allergen Reactions     No Known Drug Allergy                Again, thank you for allowing me to participate in the care of your patient.        Sincerely,        Jah Peters MD

## 2024-10-21 NOTE — PROGRESS NOTES
[facility] Dermatology Note    Encounter Date: Oct 21, 2024    Dermatology Problem List:  - BCC L tricep s/p ED/C 10/26/2021     Major PMHx  -   ______________________________________    Impression/Plan:  Abundio was seen today for skin check.    Diagnoses and all orders for this visit:    Inflamed seborrheic keratosis  -     DESTRUCT BENIGN LESION, UP TO 14  - see procedure note    Actinic keratosis  -     DESTRUCT PREMALIGNANT LESION, FIRST  -     Cancel: DESTRUCT PREMALIGNANT LESION, 2-14  -     [place] DESTRUCT PREMALIGNANT LESION, 2-14 [[id number]]  - see procedure note    History of basal cell carcinoma  - No obvious evidence of recurrence at site of previous malignancy    Lentigines  Actinic skin damage  - Reviewed the compounding benefits of incremental changes to sun protective clothing behaviors including increased frequency of sunscreen and sun protective clothing like broad brimmed hats and longsleeved UPF containing clothing    Tinea corporis  -     ketoconazole (NIZORAL) 2 % external cream; Apply topically daily.  - R thigh, peripheral leading scaling w/ central clearing  - apply daily       Cryotherapy procedure note: After verbal consent and discussion of risks and benefits including but no limited to dyspigmentation/scar, blister, and pain, 10 aks and 7 ISKs on face and back respectively was(were) treated with 1-2mm freeze border for 2 cycles with liquid nitrogen. Post cryotherapy instructions were provided.     Follow-up in 1 year .       Staff Involved:  Staff Only    Jah Peters MD   of Dermatology  Department of Dermatology  [facility]      CC:   Chief Complaint   Patient presents with    Skin Check       History of Present Illness:  Mr. Abundio Beckett is a 88 year old male who presents as a return patient.    Last seen 08/2023, we froze aks and ISKs.  He presents today for concerns about itchy spots on his trunk as well as  crusted spots on his face.  Additionally has a rash on his right thigh that is been present for several weeks and seems to be enlarging    Labs:      Physical exam:  Vitals: There were no vitals taken for this visit.  GEN: well developed, well-nourished, in no acute distress, in a pleasant mood.     SKIN: Prieto phototype 1  - Full skin, which includes the head/face, both arms, chest, back, abdomen,both legs, genitalia and/or groin buttocks, digits and/or nails, was examined.  - Flat brown macules and patches in a sun exposed areas on face and extremities  - Stuck on brown papules on trunk and extremities   - No obvious evidence of recurrence at site of previous malignancy  - gritty pink papules on face  - annular scaly rash R thigh   - No other lesions of concern on areas examined.     Past Medical History:   Past Medical History:   Diagnosis Date    Basal cell carcinoma     BENIGN PROSTATIC HYPERTROPHY 9/17/2002    Essential hypertension, benign 11/8/2016    Hyperlipidemia LDL goal <100 5/29/2012    Hypertrophy (benign) of prostate     abstracted 7/5/02.    HYPOTHYROIDISM NOS 9/17/2002    Lacunar infarction (H) 9/4/2012    Unspecified hypothyroidism     abstracted 7/5/02.     Past Surgical History:   Procedure Laterality Date    HC SHOULDER ARTHROSCOPY, DX      ZZC REMV PROSTATE,PERINEAL,RADICAL  2000       Social History:   reports that he has never smoked. He has never used smokeless tobacco. He reports current alcohol use. He reports that he does not use drugs.    Family History:  History reviewed. No pertinent family history.    Medications:  Current Outpatient Medications   Medication Sig Dispense Refill    amLODIPine (NORVASC) 5 MG tablet TAKE 1 TABLET BY MOUTH DAILY 60 tablet 5    aspirin (ASA) 325 MG EC tablet Take 1 tablet (325 mg) by mouth daily 100 tablet 3    CENTRUM SILVER OR TABS 1 TABLET DAILY      furosemide (LASIX) 20 MG tablet TAKE 1 TABLET BY MOUTH DAILY AS  NEEDED FOR EDEMA 90 tablet 3     ketoconazole (NIZORAL) 2 % external cream Apply topically daily. 30 g 3    levothyroxine (SYNTHROID/LEVOTHROID) 137 MCG tablet TAKE 1 TABLET BY MOUTH DAILY 100 tablet 0    pravastatin (PRAVACHOL) 20 MG tablet Take 1 tablet (20 mg) by mouth daily. 90 tablet 1    timolol maleate (TIMOPTIC) 0.5 % ophthalmic solution Place 1 drop into both eyes 2 times daily       Allergies   Allergen Reactions    No Known Drug Allergy

## 2024-10-30 DIAGNOSIS — E78.5 HYPERLIPIDEMIA LDL GOAL <100: ICD-10-CM

## 2024-10-30 DIAGNOSIS — I63.81 LACUNAR INFARCTION (H): ICD-10-CM

## 2024-10-30 RX ORDER — PRAVASTATIN SODIUM 20 MG
20 TABLET ORAL DAILY
Qty: 100 TABLET | Refills: 2 | OUTPATIENT
Start: 2024-10-30

## 2024-12-19 ENCOUNTER — LAB (OUTPATIENT)
Dept: LAB | Facility: CLINIC | Age: 89
End: 2024-12-19
Payer: COMMERCIAL

## 2024-12-19 DIAGNOSIS — I63.81 LACUNAR INFARCTION (H): ICD-10-CM

## 2024-12-19 DIAGNOSIS — E78.5 HYPERLIPIDEMIA LDL GOAL <100: ICD-10-CM

## 2024-12-19 LAB
ALBUMIN SERPL BCG-MCNC: 4.2 G/DL (ref 3.5–5.2)
ALP SERPL-CCNC: 71 U/L (ref 40–150)
ALT SERPL W P-5'-P-CCNC: 13 U/L (ref 0–70)
AST SERPL W P-5'-P-CCNC: 18 U/L (ref 0–45)
BILIRUB DIRECT SERPL-MCNC: <0.2 MG/DL (ref 0–0.3)
BILIRUB SERPL-MCNC: 0.6 MG/DL
CHOLEST SERPL-MCNC: 171 MG/DL
FASTING STATUS PATIENT QL REPORTED: NO
HDLC SERPL-MCNC: 49 MG/DL
LDLC SERPL CALC-MCNC: 87 MG/DL
NONHDLC SERPL-MCNC: 122 MG/DL
PROT SERPL-MCNC: 6.7 G/DL (ref 6.4–8.3)
TRIGL SERPL-MCNC: 176 MG/DL

## 2024-12-26 ENCOUNTER — VIRTUAL VISIT (OUTPATIENT)
Dept: INTERNAL MEDICINE | Facility: CLINIC | Age: 89
End: 2024-12-26
Payer: COMMERCIAL

## 2024-12-26 DIAGNOSIS — I10 ESSENTIAL HYPERTENSION, BENIGN: ICD-10-CM

## 2024-12-26 DIAGNOSIS — E03.9 HYPOTHYROIDISM, UNSPECIFIED TYPE: Primary | ICD-10-CM

## 2024-12-26 DIAGNOSIS — I63.81 LACUNAR INFARCTION (H): ICD-10-CM

## 2024-12-26 NOTE — PROGRESS NOTES
"Abundio is a 89 year old who is being evaluated via a billable telephone visit.    What phone number would you like to be contacted at? [phone]  How would you like to obtain your AVS? Shantal  Originating Location (pt. Location): Home    Distant Location (provider location):  On-site    Assessment & Plan     Hypothyroidism, unspecified type  Check thyroid function panel.  Continue with current replacement therapy pending results.  - TSH with free T4 reflex; Future    Essential hypertension, benign  Continue with current medication as directed.    Lacunar infarction (H)  Discussed with patient restarting statin based on risk factors.  He is not interested.  Would like to recheck his cholesterol at his next annual visit.  Suggest also following up with B12 level.  He states he is not taking any supplemental B12.  - Vitamin B12; Future          BMI  Estimated body mass index is 29.41 kg/m  as calculated from the following:    Height as of 8/22/24: 1.778 m (5' 10\").    Weight as of 8/22/24: 93 kg (205 lb).   Weight management plan: Discussed healthy diet and exercise guidelines      Work on weight loss  Regular exercise    Subjective   Abundio is a 89 year old, presenting for the following health issues:  Thyroid Problem and Recheck Medication    History of Present Illness       Reason for visit:  Med refills  Symptoms include:  Out of the statin but watns to discuss if should still take and if has to still take   He states he has stopped his cholesterol medicine because he states he feels a little bit weaker.    He is got a recent cold and is feeling better now but still is resolving.      TSH   Date Value Ref Range Status   10/07/2023 2.79 0.30 - 4.20 uIU/mL Final   10/19/2022 3.37 0.40 - 4.00 mU/L Final   08/17/2020 3.93 0.40 - 4.00 mU/L Final         Review of Systems:    CONSTITUTIONAL: NEGATIVE for fever, chills, change in weight  EYES: NEGATIVE for vision changes or irritation  ENT/MOUTH: NEGATIVE for ear, " mouth and throat problems  RESP: NEGATIVE for significant cough or SOB  CV: NEGATIVE for chest pain, palpitations or peripheral edema  GI: NEGATIVE for nausea, abdominal pain, heartburn, or change in bowel habits  : NEGATIVE for frequency, dysuria, or hematuria  ENDOCRINE: NEGATIVE for temperature intolerance, skin/hair changes  HEME: NEGATIVE for bleeding problems  PSYCHIATRIC: NEGATIVE for changes in mood or affect      Objective           Vitals:  No vitals were obtained today due to virtual visit.    Physical Exam   General: Alert and no distress //Respiratory: No audible wheeze, cough, or shortness of breath // Psychiatric:  Appropriate affect, tone, and pace of words      LDL Cholesterol Calculated   Date Value Ref Range Status   12/19/2024 87 <100 mg/dL Final   10/28/2020 63 <100 mg/dL Final     Comment:     Desirable:       <100 mg/dl     Component      Latest Ref Rng 6/20/2024  9:06 AM 8/1/2024  8:29 AM 12/19/2024  11:13 AM   Sodium      135 - 145 mmol/L 140      Potassium      3.4 - 5.3 mmol/L 4.2      Chloride      98 - 107 mmol/L 103      Carbon Dioxide (CO2)      22 - 29 mmol/L 27      Anion Gap      7 - 15 mmol/L 10      Urea Nitrogen      8.0 - 23.0 mg/dL 14.0      Creatinine      0.67 - 1.17 mg/dL 1.10      GFR Estimate      >60 mL/min/1.73m2 65      Calcium      8.8 - 10.2 mg/dL 9.2      Glucose      70 - 99 mg/dL 107 (H)      WBC      4.0 - 11.0 10e3/uL  5.7     RBC Count      4.40 - 5.90 10e6/uL  4.56     Hemoglobin      13.3 - 17.7 g/dL  14.8     Hematocrit      40.0 - 53.0 %  43.4     MCV      78 - 100 fL  95     MCH      26.5 - 33.0 pg  32.5     MCHC      31.5 - 36.5 g/dL  34.1     RDW      10.0 - 15.0 %  11.8     Platelet Count      150 - 450 10e3/uL  129 (L)     Protein Total      6.4 - 8.3 g/dL   6.7    Albumin      3.5 - 5.2 g/dL   4.2    Bilirubin Total      <=1.2 mg/dL   0.6    Alkaline Phosphatase      40 - 150 U/L   71    AST      0 - 45 U/L   18    ALT      0 - 70 U/L   13     Bilirubin Direct      0.00 - 0.30 mg/dL   <0.20    Cholesterol      <200 mg/dL   171    Triglycerides      <150 mg/dL   176 (H)    HDL Cholesterol      >=40 mg/dL   49    LDL Cholesterol Calculated      <100 mg/dL   87    Non HDL Cholesterol      <130 mg/dL   122    Patient Fasting?   No    Hemoglobin A1C      0.0 - 5.6 % 5.5      Vitamin B12      232 - 1,245 pg/mL 2,171 (H)  2,081 (H)           Phone call duration: 15 minutes  Signed Electronically by: Colton Chris MD

## 2025-01-23 ENCOUNTER — TELEPHONE (OUTPATIENT)
Dept: DERMATOLOGY | Facility: CLINIC | Age: OVER 89
End: 2025-01-23
Payer: COMMERCIAL

## 2025-01-23 NOTE — TELEPHONE ENCOUNTER
[facility] Call Center    Phone Message    May a detailed message be left on voicemail: yes     Reason for Call: Symptoms or Concerns     If patient has red-flag symptoms, warm transfer to triage line    Current symptom or concern: Black head went away 4-5 months and now filled with fluid and ugly 2-3 weeks on the elbow. Pt would like the  to look at this. Pt would like to know if he can drain this?    Symptoms have been present for:  2-3 week(s)    Has patient previously been seen for this? No    By : NA    Date: Na    Are there any new or worsening symptoms? Yes: please call pt back to discuss. Thanks     Action Taken: Message routed to:  Other: [facility]    Travel Screening: Not Applicable     Date of Service:

## 2025-01-27 ENCOUNTER — OFFICE VISIT (OUTPATIENT)
Dept: DERMATOLOGY | Facility: CLINIC | Age: OVER 89
End: 2025-01-27
Payer: COMMERCIAL

## 2025-01-27 ENCOUNTER — LAB (OUTPATIENT)
Dept: LAB | Facility: CLINIC | Age: OVER 89
End: 2025-01-27
Payer: COMMERCIAL

## 2025-01-27 DIAGNOSIS — L82.0 INFLAMED SEBORRHEIC KERATOSIS: ICD-10-CM

## 2025-01-27 DIAGNOSIS — E03.9 HYPOTHYROIDISM, UNSPECIFIED TYPE: ICD-10-CM

## 2025-01-27 DIAGNOSIS — L57.8 ACTINIC SKIN DAMAGE: ICD-10-CM

## 2025-01-27 DIAGNOSIS — M71.021 OLECRANON BURSA ABSCESS, RIGHT: Primary | ICD-10-CM

## 2025-01-27 DIAGNOSIS — L81.4 LENTIGINES: ICD-10-CM

## 2025-01-27 DIAGNOSIS — I63.81 LACUNAR INFARCTION (H): ICD-10-CM

## 2025-01-27 LAB
T4 FREE SERPL-MCNC: 1.24 NG/DL (ref 0.9–1.7)
TSH SERPL DL<=0.005 MIU/L-ACNC: 5.34 UIU/ML (ref 0.3–4.2)
VIT B12 SERPL-MCNC: 2173 PG/ML (ref 232–1245)

## 2025-01-27 PROCEDURE — 84443 ASSAY THYROID STIM HORMONE: CPT

## 2025-01-27 PROCEDURE — 84439 ASSAY OF FREE THYROXINE: CPT

## 2025-01-27 PROCEDURE — 36415 COLL VENOUS BLD VENIPUNCTURE: CPT

## 2025-01-27 PROCEDURE — 17111 DESTRUCTION B9 LESIONS 15/>: CPT | Performed by: STUDENT IN AN ORGANIZED HEALTH CARE EDUCATION/TRAINING PROGRAM

## 2025-01-27 PROCEDURE — 99213 OFFICE O/P EST LOW 20 MIN: CPT | Mod: 25 | Performed by: STUDENT IN AN ORGANIZED HEALTH CARE EDUCATION/TRAINING PROGRAM

## 2025-01-27 PROCEDURE — 82607 VITAMIN B-12: CPT

## 2025-01-27 NOTE — LETTER
1/27/2025      Abundio Beckett  [id number]  [facility] [address]      Dear Colleague,    Thank you for referring your patient, Abundio Beckett, to the [facility]. Please see a copy of my visit note below.    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    # Olecranon bursitis, R elbow  Recommend shielding to avoid further irritation  - Referral to orthopedics placed    # Inflamed seborrheic keratoses x 18  Lesion(s) are painful, catch clothing, occasionally bleed.   - Pt agreeable to cryotherapy for palliation of symptoms    #Lentigines  #Actinic skin damage  - Reviewed the compounding benefits of incremental changes to sun protective clothing behaviors including increased frequency of sunscreen and sun protective clothing like broad brimmed hats and longsleeved UPF containing clothing    Cryotherapy procedure: back   After verbal consent and discussion of risks/benefits including, but not limited to, dyspigmentation, scar, blister, and pain, lesions treated with 1-2 mm freeze border for 1-2 cycles with liquid nitrogen. Post-care provided.     Follow-up:   10/22/2025 for full body skin exam, as needed for new or changing lesions or new concerns    Staff Involved:  Izaiah Woodward PA-C  [facility] Dermatology      CC:   Chief Complaint   Patient presents with     Derm Problem     - spot on right elbow, previously treated with cryo, still there and is sore and filled with fluid       History of Present Illness:  Mr. Abundio Beckett is a 89 year old male who presents as a return patient.    Last seen October 2024 for skin check, froze some ISKs and AKs, no biopsies.    1) Itchy lesions on back, would like treated today  2) Elbow swelling present x  1 month, tender    Labs:      Physical exam:  Vitals: There were no vitals taken for this visit.  GEN: well developed, well-nourished, in no acute distress, in a pleasant mood.     SKIN: focused examination of R elbow and back  - fluctuant edema with no overlying skin changes over the R olecrenon  - waxy papule(s) with inflamed erythematous base on back  - No other lesions of concern on areas examined.     Past Medical History:   Past Medical History:   Diagnosis Date     Basal cell carcinoma      BENIGN PROSTATIC HYPERTROPHY 9/17/2002     Essential hypertension, benign 11/8/2016     Hyperlipidemia LDL goal <100 5/29/2012     Hypertrophy (benign) of prostate     abstracted 7/5/02.     HYPOTHYROIDISM NOS 9/17/2002     Lacunar infarction (H) 9/4/2012     Unspecified hypothyroidism     abstracted 7/5/02.     Past Surgical History:   Procedure Laterality Date     HC SHOULDER ARTHROSCOPY, DX       ZZC REMV PROSTATE,PERINEAL,RADICAL  2000       Social History:   reports that he has never smoked. He has never used smokeless tobacco. He reports current alcohol use. He reports that he does not use drugs.    Family History:  History reviewed. No pertinent family history.    Medications:  Current Outpatient Medications   Medication Sig Dispense Refill     amLODIPine (NORVASC) 5 MG tablet TAKE 1 TABLET BY MOUTH DAILY 60 tablet 5     aspirin (ASA) 325 MG EC tablet Take 1 tablet (325 mg) by mouth daily 100 tablet 3     CENTRUM SILVER OR TABS 1 TABLET DAILY       furosemide (LASIX) 20 MG tablet TAKE 1 TABLET BY MOUTH DAILY AS  NEEDED FOR EDEMA 90 tablet 3     ketoconazole (NIZORAL) 2 % external cream Apply topically daily. 30 g 3     levothyroxine (SYNTHROID/LEVOTHROID) 137 MCG tablet TAKE 1 TABLET BY MOUTH DAILY 100 tablet 2     timolol maleate (TIMOPTIC) 0.5 % ophthalmic solution Place 1 drop into both eyes 2 times daily       Allergies   Allergen Reactions     Pravachol [Pravastatin] Muscle Pain (Myalgia)                Attestation with edits by Jah Peters MD at 1/27/2025 12:28 PM:  I have personally examined this patient and agree with thePA's documentation and plan of care. I have reviewed and amended the PA's note. The documentation accurately reflects my clinical observations, diagnoses, treatment and follow-up plans. I was present for key portions of the procedure(s).       Jah Peters MD  Dermatology Staff      Again, thank you for allowing me to participate in the care of your patient.        Sincerely,        Jah Peters MD    Electronically signed

## 2025-01-27 NOTE — PROGRESS NOTES
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    # Olecranon bursitis, R elbow  Recommend shielding to avoid further irritation  - Referral to orthopedics placed    # Inflamed seborrheic keratoses x 18  Lesion(s) are painful, catch clothing, occasionally bleed.   - Pt agreeable to cryotherapy for palliation of symptoms    #Lentigines  #Actinic skin damage  - Reviewed the compounding benefits of incremental changes to sun protective clothing behaviors including increased frequency of sunscreen and sun protective clothing like broad brimmed hats and longsleeved UPF containing clothing    Cryotherapy procedure: back   After verbal consent and discussion of risks/benefits including, but not limited to, dyspigmentation, scar, blister, and pain, lesions treated with 1-2 mm freeze border for 1-2 cycles with liquid nitrogen. Post-care provided.     Follow-up:   10/22/2025 for full body skin exam, as needed for new or changing lesions or new concerns    Staff Involved:  Izaiah Woodward PA-C  [facility] Dermatology      CC:   Chief Complaint   Patient presents with    Derm Problem     - spot on right elbow, previously treated with cryo, still there and is sore and filled with fluid       History of Present Illness:  Mr. Abundio Beckett is a 89 year old male who presents as a return patient.    Last seen October 2024 for skin check, froze some ISKs and AKs, no biopsies.    1) Itchy lesions on back, would like treated today  2) Elbow swelling present x 1 month, tender    Labs:      Physical exam:  Vitals: There were no vitals taken for this visit.  GEN: well developed, well-nourished, in no acute distress, in a pleasant mood.     SKIN: focused examination of R elbow and back  - fluctuant edema with no  overlying skin changes over the R olecrenon  - waxy papule(s) with inflamed erythematous base on back  - No other lesions of concern on areas examined.     Past Medical History:   Past Medical History:   Diagnosis Date    Basal cell carcinoma     BENIGN PROSTATIC HYPERTROPHY 9/17/2002    Essential hypertension, benign 11/8/2016    Hyperlipidemia LDL goal <100 5/29/2012    Hypertrophy (benign) of prostate     abstracted 7/5/02.    HYPOTHYROIDISM NOS 9/17/2002    Lacunar infarction (H) 9/4/2012    Unspecified hypothyroidism     abstracted 7/5/02.     Past Surgical History:   Procedure Laterality Date    HC SHOULDER ARTHROSCOPY, DX      ZZC REMV PROSTATE,PERINEAL,RADICAL  2000       Social History:   reports that he has never smoked. He has never used smokeless tobacco. He reports current alcohol use. He reports that he does not use drugs.    Family History:  History reviewed. No pertinent family history.    Medications:  Current Outpatient Medications   Medication Sig Dispense Refill    amLODIPine (NORVASC) 5 MG tablet TAKE 1 TABLET BY MOUTH DAILY 60 tablet 5    aspirin (ASA) 325 MG EC tablet Take 1 tablet (325 mg) by mouth daily 100 tablet 3    CENTRUM SILVER OR TABS 1 TABLET DAILY      furosemide (LASIX) 20 MG tablet TAKE 1 TABLET BY MOUTH DAILY AS  NEEDED FOR EDEMA 90 tablet 3    ketoconazole (NIZORAL) 2 % external cream Apply topically daily. 30 g 3    levothyroxine (SYNTHROID/LEVOTHROID) 137 MCG tablet TAKE 1 TABLET BY MOUTH DAILY 100 tablet 2    timolol maleate (TIMOPTIC) 0.5 % ophthalmic solution Place 1 drop into both eyes 2 times daily       Allergies   Allergen Reactions    Pravachol [Pravastatin] Muscle Pain (Myalgia)

## 2025-02-03 ENCOUNTER — TELEPHONE (OUTPATIENT)
Dept: INTERNAL MEDICINE | Facility: CLINIC | Age: OVER 89
End: 2025-02-03
Payer: COMMERCIAL

## 2025-02-03 NOTE — TELEPHONE ENCOUNTER
See letter dated 12/19/2024:    Your cholesterol looks good and I would not change anything at this point but would repeat your labs in 12 months.  Working on your diet and exercise will help lower your triglyceride level even further.

## 2025-02-03 NOTE — TELEPHONE ENCOUNTER
Test Results    Contacts       Contact Date/Time Type Contact Phone/Fax    02/03/2025 01:01 PM CST Phone (Incoming) Abundio Beckett (Self) [phone] ()            Who ordered the test:  PCP    Type of test: Lab    Date of test:  N/A    Where was the test performed:  [facility]    What are your questions/concerns?:  Patient wondering the results of cholesterol, states that he received everything besides that    Okay to leave a detailed message?: Yes at Home number on file [phone] ([place])

## 2025-02-18 ENCOUNTER — PATIENT OUTREACH (OUTPATIENT)
Dept: CARE COORDINATION | Facility: CLINIC | Age: OVER 89
End: 2025-02-18
Payer: COMMERCIAL

## 2025-03-04 ENCOUNTER — PATIENT OUTREACH (OUTPATIENT)
Dept: CARE COORDINATION | Facility: CLINIC | Age: OVER 89
End: 2025-03-04
Payer: COMMERCIAL

## 2025-03-12 ENCOUNTER — OFFICE VISIT (OUTPATIENT)
Dept: INTERNAL MEDICINE | Facility: CLINIC | Age: OVER 89
End: 2025-03-12
Payer: COMMERCIAL

## 2025-03-12 VITALS
OXYGEN SATURATION: 95 % | RESPIRATION RATE: 18 BRPM | SYSTOLIC BLOOD PRESSURE: 112 MMHG | HEART RATE: 78 BPM | TEMPERATURE: 96.8 F | DIASTOLIC BLOOD PRESSURE: 64 MMHG | WEIGHT: 206.9 LBS | HEIGHT: 70 IN | BODY MASS INDEX: 29.62 KG/M2

## 2025-03-12 DIAGNOSIS — E03.9 HYPOTHYROIDISM, UNSPECIFIED TYPE: ICD-10-CM

## 2025-03-12 DIAGNOSIS — K59.00 CONSTIPATION, UNSPECIFIED CONSTIPATION TYPE: ICD-10-CM

## 2025-03-12 DIAGNOSIS — I63.81 LACUNAR INFARCTION (H): ICD-10-CM

## 2025-03-12 DIAGNOSIS — E78.5 HYPERLIPIDEMIA LDL GOAL <100: ICD-10-CM

## 2025-03-12 DIAGNOSIS — I10 ESSENTIAL HYPERTENSION, BENIGN: Primary | ICD-10-CM

## 2025-03-12 PROCEDURE — 3078F DIAST BP <80 MM HG: CPT | Performed by: INTERNAL MEDICINE

## 2025-03-12 PROCEDURE — 3074F SYST BP LT 130 MM HG: CPT | Performed by: INTERNAL MEDICINE

## 2025-03-12 PROCEDURE — 99214 OFFICE O/P EST MOD 30 MIN: CPT | Performed by: INTERNAL MEDICINE

## 2025-03-12 NOTE — PROGRESS NOTES
Assessment & Plan     Essential hypertension, benign  Stable on therapy continue with medical management.    Lacunar infarction (H)  Patient maximal risk reduction in regards to blood pressure and cholesterol.  Continue with aspirin daily.    Hyperlipidemia LDL goal <70  Discussed goal LDL.  Patient not interested in restarting statin therapy due to intolerance but would like to know if cholesterol is increased  - Lipid panel reflex to direct LDL Fasting; Future    Hypothyroidism, unspecified type  Recheck subclinical thyroid levels.  - TSH with free T4 reflex; Future    Constipation, unspecified constipation type  Discussed with patient MiraLAX use, hydration and stool softeners.  Discussed with patient if stool caliber changes or does not improve may need to consider lower GI assessment    See Patient Instructions    Maty Del Castillo is a 89 year old, presenting for the following health issues:  Recheck Medication and Constipation    History of Present Illness       Reason for visit:  Follow up thyroid    He eats 0-1 servings of fruits and vegetables daily.He consumes 1 sweetened beverage(s) daily.         Constipation    Duration: Chronic - concerned about decreased diameter and size of stools x 1 month   Description:       Frequency of bowel movements: 4-5 days        Consistency of stool: hard  Intensity:  moderate  Accompanying signs and symptoms:        Abdominal pain: no        Rectal pain: no        Blood in stool: no        Nausea/vomitting: no   History:        Similar problems in past: YES  Precipitating or alleviating factors: Takes laxative nightly        Medications worsening symptoms: unknown   Therapies tried and outcome: Laxative powder nightly. Using enemas if needed.        Chronic laxative use: YES- powered laxative      Other concerns:  1. Follow up cholesterol. Discontinued medication in December     Review of Systems  CONSTITUTIONAL: NEGATIVE for fever, chills, change in weight  EYES:  "NEGATIVE for vision changes or irritation  ENT/MOUTH: NEGATIVE for ear, mouth and throat problems  RESP: NEGATIVE for significant cough or SOB  CV: NEGATIVE for chest pain, palpitations or peripheral edema  GI: NEGATIVE for nausea, abdominal pain, heartburn  : NEGATIVE for frequency, dysuria, or hematuria  MUSCULOSKELETAL: NEGATIVE for significant arthralgias or myalgia  NEURO: NEGATIVE for weakness, dizziness or paresthesias  HEME: NEGATIVE for bleeding problems  PSYCHIATRIC: NEGATIVE for changes in mood or affect      Objective    /64   Pulse 78   Temp 96.8  F (36  C) (Temporal)   Resp 18   Ht 1.778 m (5' 10\")   Wt 93.8 kg (206 lb 14.4 oz)   SpO2 95%   BMI 29.69 kg/m    Body mass index is 29.69 kg/m .    Physical Exam   GENERAL: alert and no distress  EYES: Eyes grossly normal to inspection, PERRL and conjunctivae and sclerae normal  HENT: ear canals and TM's normal, nose and mouth without ulcers or lesions  NECK: no adenopathy, no asymmetry, masses, or scars  RESP: lungs clear to auscultation - no rales, rhonchi or wheezes  CV: regular rate and rhythm, normal S1 S2, no S3 or S4, no click or rub, no peripheral edema  ABDOMEN: soft, nontender, no hepatosplenomegaly, no masses and bowel sounds normal  MS: no gross musculoskeletal defects noted  Is slightly slowed and antalgic.  NEURO: No focal changes  PSYCH: mentation appears normal, affect normal/bright        Component      Latest Ref Rn 12/19/2024  11:13 AM 1/27/2025  9:39 AM   Protein Total      6.4 - 8.3 g/dL 6.7     Albumin      3.5 - 5.2 g/dL 4.2     Bilirubin Total      <=1.2 mg/dL 0.6     Alkaline Phosphatase      40 - 150 U/L 71     AST      0 - 45 U/L 18     ALT      0 - 70 U/L 13     Bilirubin Direct      0.00 - 0.30 mg/dL <0.20     Cholesterol      <200 mg/dL 171     Triglycerides      <150 mg/dL 176 (H)     HDL Cholesterol      >=40 mg/dL 49     LDL Cholesterol Calculated      <100 mg/dL 87     Non HDL Cholesterol      <130 mg/dL 122   "   Patient Fasting? No     TSH      0.30 - 4.20 uIU/mL  5.34 (H)    Vitamin B12      232 - 1,245 pg/mL  2,173 (H)    T4 Free      0.90 - 1.70 ng/dL  1.24       Legend:  (H) High  Signed Electronically by: Colton Chris MD

## 2025-03-13 ENCOUNTER — LAB (OUTPATIENT)
Dept: LAB | Facility: CLINIC | Age: OVER 89
End: 2025-03-13
Payer: COMMERCIAL

## 2025-03-13 DIAGNOSIS — E78.5 HYPERLIPIDEMIA LDL GOAL <100: ICD-10-CM

## 2025-03-13 DIAGNOSIS — E03.9 HYPOTHYROIDISM, UNSPECIFIED TYPE: ICD-10-CM

## 2025-03-13 LAB
CHOLEST SERPL-MCNC: 188 MG/DL
FASTING STATUS PATIENT QL REPORTED: YES
HDLC SERPL-MCNC: 52 MG/DL
LDLC SERPL CALC-MCNC: 110 MG/DL
NONHDLC SERPL-MCNC: 136 MG/DL
T4 FREE SERPL-MCNC: 1.21 NG/DL (ref 0.9–1.7)
TRIGL SERPL-MCNC: 128 MG/DL
TSH SERPL DL<=0.005 MIU/L-ACNC: 5.73 UIU/ML (ref 0.3–4.2)

## 2025-03-20 DIAGNOSIS — E03.9 HYPOTHYROIDISM, UNSPECIFIED TYPE: ICD-10-CM

## 2025-03-20 RX ORDER — LEVOTHYROXINE SODIUM 137 UG/1
137 TABLET ORAL DAILY
Qty: 100 TABLET | Refills: 2 | Status: SHIPPED | OUTPATIENT
Start: 2025-03-20

## 2025-03-20 NOTE — TELEPHONE ENCOUNTER
Pt called the clinic informing that he received the letter for his recent lab results. Pt wondering if he is still on the thyroid medications based on the lab or change in doseage, pt not sure - asking for PCP advise.     Rx and pharmacy pended for your review if appropriate. Thank you.    Esdras Villeda RN  [facility]

## 2025-04-14 DIAGNOSIS — R60.9 EDEMA, UNSPECIFIED TYPE: ICD-10-CM

## 2025-04-14 DIAGNOSIS — I10 ESSENTIAL HYPERTENSION, BENIGN: ICD-10-CM

## 2025-04-15 RX ORDER — FUROSEMIDE 20 MG/1
TABLET ORAL
Qty: 100 TABLET | Refills: 2 | Status: SHIPPED | OUTPATIENT
Start: 2025-04-15

## 2025-04-20 ENCOUNTER — APPOINTMENT (OUTPATIENT)
Dept: GENERAL RADIOLOGY | Facility: CLINIC | Age: OVER 89
DRG: 481 | End: 2025-04-20
Attending: EMERGENCY MEDICINE
Payer: COMMERCIAL

## 2025-04-20 ENCOUNTER — APPOINTMENT (OUTPATIENT)
Dept: CT IMAGING | Facility: CLINIC | Age: OVER 89
DRG: 481 | End: 2025-04-20
Attending: EMERGENCY MEDICINE
Payer: COMMERCIAL

## 2025-04-20 ENCOUNTER — HOSPITAL ENCOUNTER (INPATIENT)
Facility: CLINIC | Age: OVER 89
LOS: 4 days | Discharge: NURSING, REHAB OR LONG-TERM CARE | DRG: 481 | End: 2025-04-24
Attending: EMERGENCY MEDICINE | Admitting: INTERNAL MEDICINE
Payer: COMMERCIAL

## 2025-04-20 DIAGNOSIS — S72.141A CLOSED DISPLACED INTERTROCHANTERIC FRACTURE OF RIGHT FEMUR, INITIAL ENCOUNTER (H): ICD-10-CM

## 2025-04-20 DIAGNOSIS — E87.6 HYPOKALEMIA: ICD-10-CM

## 2025-04-20 PROBLEM — S72.009A HIP FRACTURE (H): Status: ACTIVE | Noted: 2025-04-20

## 2025-04-20 LAB
ANION GAP SERPL CALCULATED.3IONS-SCNC: 10 MMOL/L (ref 7–15)
BASOPHILS # BLD AUTO: 0 10E3/UL (ref 0–0.2)
BASOPHILS NFR BLD AUTO: 1 %
BUN SERPL-MCNC: 16.5 MG/DL (ref 8–23)
CALCIUM SERPL-MCNC: 8.9 MG/DL (ref 8.8–10.4)
CHLORIDE SERPL-SCNC: 101 MMOL/L (ref 98–107)
CREAT SERPL-MCNC: 0.87 MG/DL (ref 0.67–1.17)
EGFRCR SERPLBLD CKD-EPI 2021: 82 ML/MIN/1.73M2
EOSINOPHIL # BLD AUTO: 0.1 10E3/UL (ref 0–0.7)
EOSINOPHIL NFR BLD AUTO: 1 %
ERYTHROCYTE [DISTWIDTH] IN BLOOD BY AUTOMATED COUNT: 12.6 % (ref 10–15)
GLUCOSE SERPL-MCNC: 135 MG/DL (ref 70–99)
HCO3 SERPL-SCNC: 27 MMOL/L (ref 22–29)
HCT VFR BLD AUTO: 41 % (ref 40–53)
HGB BLD-MCNC: 14 G/DL (ref 13.3–17.7)
HOLD SPECIMEN: NORMAL
HOLD SPECIMEN: NORMAL
IMM GRANULOCYTES # BLD: 0 10E3/UL
IMM GRANULOCYTES NFR BLD: 1 %
LYMPHOCYTES # BLD AUTO: 1.7 10E3/UL (ref 0.8–5.3)
LYMPHOCYTES NFR BLD AUTO: 26 %
MCH RBC QN AUTO: 32.8 PG (ref 26.5–33)
MCHC RBC AUTO-ENTMCNC: 34.1 G/DL (ref 31.5–36.5)
MCV RBC AUTO: 96 FL (ref 78–100)
MONOCYTES # BLD AUTO: 0.7 10E3/UL (ref 0–1.3)
MONOCYTES NFR BLD AUTO: 11 %
NEUTROPHILS # BLD AUTO: 3.8 10E3/UL (ref 1.6–8.3)
NEUTROPHILS NFR BLD AUTO: 61 %
NRBC # BLD AUTO: 0 10E3/UL
NRBC BLD AUTO-RTO: 0 /100
PLATELET # BLD AUTO: 146 10E3/UL (ref 150–450)
POTASSIUM SERPL-SCNC: 3.3 MMOL/L (ref 3.4–5.3)
RBC # BLD AUTO: 4.27 10E6/UL (ref 4.4–5.9)
SODIUM SERPL-SCNC: 138 MMOL/L (ref 135–145)
WBC # BLD AUTO: 6.2 10E3/UL (ref 4–11)

## 2025-04-20 PROCEDURE — 82306 VITAMIN D 25 HYDROXY: CPT | Performed by: PHYSICIAN ASSISTANT

## 2025-04-20 PROCEDURE — 73502 X-RAY EXAM HIP UNI 2-3 VIEWS: CPT

## 2025-04-20 PROCEDURE — 250N000013 HC RX MED GY IP 250 OP 250 PS 637: Performed by: EMERGENCY MEDICINE

## 2025-04-20 PROCEDURE — 93005 ELECTROCARDIOGRAM TRACING: CPT

## 2025-04-20 PROCEDURE — 96374 THER/PROPH/DIAG INJ IV PUSH: CPT

## 2025-04-20 PROCEDURE — 99285 EMERGENCY DEPT VISIT HI MDM: CPT | Mod: 25

## 2025-04-20 PROCEDURE — 85004 AUTOMATED DIFF WBC COUNT: CPT | Performed by: EMERGENCY MEDICINE

## 2025-04-20 PROCEDURE — 120N000001 HC R&B MED SURG/OB

## 2025-04-20 PROCEDURE — 36415 COLL VENOUS BLD VENIPUNCTURE: CPT | Performed by: EMERGENCY MEDICINE

## 2025-04-20 PROCEDURE — 250N000011 HC RX IP 250 OP 636: Mod: JZ | Performed by: EMERGENCY MEDICINE

## 2025-04-20 PROCEDURE — 99223 1ST HOSP IP/OBS HIGH 75: CPT | Performed by: INTERNAL MEDICINE

## 2025-04-20 PROCEDURE — 80048 BASIC METABOLIC PNL TOTAL CA: CPT | Performed by: EMERGENCY MEDICINE

## 2025-04-20 PROCEDURE — 70450 CT HEAD/BRAIN W/O DYE: CPT

## 2025-04-20 RX ORDER — MORPHINE SULFATE 4 MG/ML
4 INJECTION, SOLUTION INTRAMUSCULAR; INTRAVENOUS ONCE
Status: COMPLETED | OUTPATIENT
Start: 2025-04-20 | End: 2025-04-20

## 2025-04-20 RX ORDER — LIDOCAINE 40 MG/G
CREAM TOPICAL
Status: DISCONTINUED | OUTPATIENT
Start: 2025-04-20 | End: 2025-04-21

## 2025-04-20 RX ORDER — PROCHLORPERAZINE MALEATE 5 MG/1
5 TABLET ORAL EVERY 6 HOURS PRN
Status: DISCONTINUED | OUTPATIENT
Start: 2025-04-20 | End: 2025-04-21

## 2025-04-20 RX ORDER — KETOCONAZOLE 20 MG/G
CREAM TOPICAL DAILY
Status: DISCONTINUED | OUTPATIENT
Start: 2025-04-21 | End: 2025-04-24 | Stop reason: HOSPADM

## 2025-04-20 RX ORDER — ACETAMINOPHEN 325 MG/1
650 TABLET ORAL EVERY 4 HOURS PRN
Status: DISCONTINUED | OUTPATIENT
Start: 2025-04-20 | End: 2025-04-24 | Stop reason: HOSPADM

## 2025-04-20 RX ORDER — POTASSIUM CHLORIDE 1500 MG/1
40 TABLET, EXTENDED RELEASE ORAL ONCE
Status: COMPLETED | OUTPATIENT
Start: 2025-04-20 | End: 2025-04-20

## 2025-04-20 RX ORDER — LEVOTHYROXINE SODIUM 137 UG/1
137 TABLET ORAL DAILY
Status: DISCONTINUED | OUTPATIENT
Start: 2025-04-21 | End: 2025-04-24 | Stop reason: HOSPADM

## 2025-04-20 RX ORDER — AMOXICILLIN 250 MG
1 CAPSULE ORAL 2 TIMES DAILY PRN
Status: DISCONTINUED | OUTPATIENT
Start: 2025-04-20 | End: 2025-04-24 | Stop reason: HOSPADM

## 2025-04-20 RX ORDER — ACETAMINOPHEN 650 MG/1
650 SUPPOSITORY RECTAL EVERY 4 HOURS PRN
Status: DISCONTINUED | OUTPATIENT
Start: 2025-04-20 | End: 2025-04-24 | Stop reason: HOSPADM

## 2025-04-20 RX ORDER — LATANOPROST 50 UG/ML
1 SOLUTION/ DROPS OPHTHALMIC EVERY EVENING
COMMUNITY

## 2025-04-20 RX ORDER — OXYCODONE HYDROCHLORIDE 5 MG/1
5 TABLET ORAL EVERY 4 HOURS PRN
Status: DISCONTINUED | OUTPATIENT
Start: 2025-04-20 | End: 2025-04-21

## 2025-04-20 RX ORDER — LATANOPROST 50 UG/ML
1 SOLUTION/ DROPS OPHTHALMIC EVERY EVENING
Status: DISCONTINUED | OUTPATIENT
Start: 2025-04-21 | End: 2025-04-24 | Stop reason: HOSPADM

## 2025-04-20 RX ORDER — CALCIUM CARBONATE 500 MG/1
1000 TABLET, CHEWABLE ORAL 4 TIMES DAILY PRN
Status: DISCONTINUED | OUTPATIENT
Start: 2025-04-20 | End: 2025-04-24 | Stop reason: HOSPADM

## 2025-04-20 RX ORDER — AMOXICILLIN 250 MG
2 CAPSULE ORAL 2 TIMES DAILY PRN
Status: DISCONTINUED | OUTPATIENT
Start: 2025-04-20 | End: 2025-04-24 | Stop reason: HOSPADM

## 2025-04-20 RX ORDER — HYDROMORPHONE HYDROCHLORIDE 1 MG/ML
.3-.5 INJECTION, SOLUTION INTRAMUSCULAR; INTRAVENOUS; SUBCUTANEOUS EVERY 4 HOURS PRN
Status: DISCONTINUED | OUTPATIENT
Start: 2025-04-20 | End: 2025-04-21

## 2025-04-20 RX ORDER — ONDANSETRON 4 MG/1
4 TABLET, ORALLY DISINTEGRATING ORAL EVERY 6 HOURS PRN
Status: DISCONTINUED | OUTPATIENT
Start: 2025-04-20 | End: 2025-04-21

## 2025-04-20 RX ORDER — POLYETHYLENE GLYCOL 3350 17 G/17G
17 POWDER, FOR SOLUTION ORAL 2 TIMES DAILY PRN
Status: DISCONTINUED | OUTPATIENT
Start: 2025-04-20 | End: 2025-04-24 | Stop reason: HOSPADM

## 2025-04-20 RX ORDER — ONDANSETRON 2 MG/ML
4 INJECTION INTRAMUSCULAR; INTRAVENOUS EVERY 6 HOURS PRN
Status: DISCONTINUED | OUTPATIENT
Start: 2025-04-20 | End: 2025-04-21

## 2025-04-20 RX ORDER — TIMOLOL MALEATE 5 MG/ML
1 SOLUTION/ DROPS OPHTHALMIC 2 TIMES DAILY
Status: DISCONTINUED | OUTPATIENT
Start: 2025-04-21 | End: 2025-04-24 | Stop reason: HOSPADM

## 2025-04-20 RX ORDER — AMLODIPINE BESYLATE 5 MG/1
5 TABLET ORAL DAILY
Status: DISCONTINUED | OUTPATIENT
Start: 2025-04-21 | End: 2025-04-24 | Stop reason: HOSPADM

## 2025-04-20 RX ADMIN — POTASSIUM CHLORIDE 40 MEQ: 1500 TABLET, EXTENDED RELEASE ORAL at 21:56

## 2025-04-20 RX ADMIN — MORPHINE SULFATE 4 MG: 4 INJECTION, SOLUTION INTRAMUSCULAR; INTRAVENOUS at 22:14

## 2025-04-20 RX ADMIN — MORPHINE SULFATE 4 MG: 4 INJECTION, SOLUTION INTRAMUSCULAR; INTRAVENOUS at 21:20

## 2025-04-20 ASSESSMENT — ACTIVITIES OF DAILY LIVING (ADL)
ADLS_ACUITY_SCORE: 45

## 2025-04-20 ASSESSMENT — COLUMBIA-SUICIDE SEVERITY RATING SCALE - C-SSRS
1. IN THE PAST MONTH, HAVE YOU WISHED YOU WERE DEAD OR WISHED YOU COULD GO TO SLEEP AND NOT WAKE UP?: NO
2. HAVE YOU ACTUALLY HAD ANY THOUGHTS OF KILLING YOURSELF IN THE PAST MONTH?: NO
6. HAVE YOU EVER DONE ANYTHING, STARTED TO DO ANYTHING, OR PREPARED TO DO ANYTHING TO END YOUR LIFE?: NO

## 2025-04-21 ENCOUNTER — ANESTHESIA EVENT (OUTPATIENT)
Dept: SURGERY | Facility: CLINIC | Age: OVER 89
End: 2025-04-21
Payer: COMMERCIAL

## 2025-04-21 ENCOUNTER — ANESTHESIA (OUTPATIENT)
Dept: SURGERY | Facility: CLINIC | Age: OVER 89
End: 2025-04-21
Payer: COMMERCIAL

## 2025-04-21 ENCOUNTER — APPOINTMENT (OUTPATIENT)
Dept: GENERAL RADIOLOGY | Facility: CLINIC | Age: OVER 89
DRG: 481 | End: 2025-04-21
Attending: INTERNAL MEDICINE
Payer: COMMERCIAL

## 2025-04-21 LAB
ABO + RH BLD: NORMAL
ANION GAP SERPL CALCULATED.3IONS-SCNC: 10 MMOL/L (ref 7–15)
ATRIAL RATE - MUSE: 74 BPM
BASOPHILS # BLD AUTO: 0 10E3/UL (ref 0–0.2)
BASOPHILS NFR BLD AUTO: 0 %
BLD GP AB SCN SERPL QL: NEGATIVE
BUN SERPL-MCNC: 13 MG/DL (ref 8–23)
CALCIUM SERPL-MCNC: 9 MG/DL (ref 8.8–10.4)
CHLORIDE SERPL-SCNC: 102 MMOL/L (ref 98–107)
CREAT SERPL-MCNC: 0.73 MG/DL (ref 0.67–1.17)
DIASTOLIC BLOOD PRESSURE - MUSE: NORMAL MMHG
EGFRCR SERPLBLD CKD-EPI 2021: 87 ML/MIN/1.73M2
EOSINOPHIL # BLD AUTO: 0 10E3/UL (ref 0–0.7)
EOSINOPHIL NFR BLD AUTO: 0 %
ERYTHROCYTE [DISTWIDTH] IN BLOOD BY AUTOMATED COUNT: 12.7 % (ref 10–15)
GLUCOSE SERPL-MCNC: 130 MG/DL (ref 70–99)
HCO3 SERPL-SCNC: 25 MMOL/L (ref 22–29)
HCT VFR BLD AUTO: 41 % (ref 40–53)
HGB BLD-MCNC: 13.9 G/DL (ref 13.3–17.7)
IMM GRANULOCYTES # BLD: 0 10E3/UL
IMM GRANULOCYTES NFR BLD: 0 %
INTERPRETATION ECG - MUSE: NORMAL
LYMPHOCYTES # BLD AUTO: 1.1 10E3/UL (ref 0.8–5.3)
LYMPHOCYTES NFR BLD AUTO: 12 %
MCH RBC QN AUTO: 32.6 PG (ref 26.5–33)
MCHC RBC AUTO-ENTMCNC: 33.9 G/DL (ref 31.5–36.5)
MCV RBC AUTO: 96 FL (ref 78–100)
MONOCYTES # BLD AUTO: 1 10E3/UL (ref 0–1.3)
MONOCYTES NFR BLD AUTO: 11 %
NEUTROPHILS # BLD AUTO: 7.2 10E3/UL (ref 1.6–8.3)
NEUTROPHILS NFR BLD AUTO: 76 %
NRBC # BLD AUTO: 0 10E3/UL
NRBC BLD AUTO-RTO: 0 /100
P AXIS - MUSE: 72 DEGREES
PLATELET # BLD AUTO: 146 10E3/UL (ref 150–450)
POTASSIUM SERPL-SCNC: 4.3 MMOL/L (ref 3.4–5.3)
PR INTERVAL - MUSE: 192 MS
QRS DURATION - MUSE: 92 MS
QT - MUSE: 414 MS
QTC - MUSE: 459 MS
R AXIS - MUSE: -44 DEGREES
RBC # BLD AUTO: 4.26 10E6/UL (ref 4.4–5.9)
SODIUM SERPL-SCNC: 137 MMOL/L (ref 135–145)
SPECIMEN EXP DATE BLD: NORMAL
SYSTOLIC BLOOD PRESSURE - MUSE: NORMAL MMHG
T AXIS - MUSE: 53 DEGREES
VENTRICULAR RATE- MUSE: 74 BPM
VIT D+METAB SERPL-MCNC: 33 NG/ML (ref 20–50)
WBC # BLD AUTO: 9.4 10E3/UL (ref 4–11)

## 2025-04-21 PROCEDURE — 250N000009 HC RX 250: Performed by: STUDENT IN AN ORGANIZED HEALTH CARE EDUCATION/TRAINING PROGRAM

## 2025-04-21 PROCEDURE — 250N000009 HC RX 250: Performed by: ORTHOPAEDIC SURGERY

## 2025-04-21 PROCEDURE — 120N000001 HC R&B MED SURG/OB

## 2025-04-21 PROCEDURE — 250N000025 HC SEVOFLURANE, PER MIN: Performed by: ORTHOPAEDIC SURGERY

## 2025-04-21 PROCEDURE — 36415 COLL VENOUS BLD VENIPUNCTURE: CPT | Performed by: INTERNAL MEDICINE

## 2025-04-21 PROCEDURE — 250N000011 HC RX IP 250 OP 636: Mod: JZ | Performed by: STUDENT IN AN ORGANIZED HEALTH CARE EDUCATION/TRAINING PROGRAM

## 2025-04-21 PROCEDURE — 36415 COLL VENOUS BLD VENIPUNCTURE: CPT | Performed by: STUDENT IN AN ORGANIZED HEALTH CARE EDUCATION/TRAINING PROGRAM

## 2025-04-21 PROCEDURE — 258N000003 HC RX IP 258 OP 636: Performed by: NURSE ANESTHETIST, CERTIFIED REGISTERED

## 2025-04-21 PROCEDURE — 272N000001 HC OR GENERAL SUPPLY STERILE: Performed by: ORTHOPAEDIC SURGERY

## 2025-04-21 PROCEDURE — 999N000179 XR SURGERY CARM FLUORO LESS THAN 5 MIN W STILLS

## 2025-04-21 PROCEDURE — 250N000013 HC RX MED GY IP 250 OP 250 PS 637

## 2025-04-21 PROCEDURE — 250N000011 HC RX IP 250 OP 636: Performed by: NURSE ANESTHETIST, CERTIFIED REGISTERED

## 2025-04-21 PROCEDURE — 86900 BLOOD TYPING SEROLOGIC ABO: CPT | Performed by: STUDENT IN AN ORGANIZED HEALTH CARE EDUCATION/TRAINING PROGRAM

## 2025-04-21 PROCEDURE — 258N000003 HC RX IP 258 OP 636

## 2025-04-21 PROCEDURE — 250N000011 HC RX IP 250 OP 636: Performed by: ORTHOPAEDIC SURGERY

## 2025-04-21 PROCEDURE — 250N000009 HC RX 250: Performed by: INTERNAL MEDICINE

## 2025-04-21 PROCEDURE — C1713 ANCHOR/SCREW BN/BN,TIS/BN: HCPCS | Performed by: ORTHOPAEDIC SURGERY

## 2025-04-21 PROCEDURE — 250N000011 HC RX IP 250 OP 636: Mod: JZ | Performed by: INTERNAL MEDICINE

## 2025-04-21 PROCEDURE — 258N000003 HC RX IP 258 OP 636: Performed by: STUDENT IN AN ORGANIZED HEALTH CARE EDUCATION/TRAINING PROGRAM

## 2025-04-21 PROCEDURE — 999N000141 HC STATISTIC PRE-PROCEDURE NURSING ASSESSMENT: Performed by: ORTHOPAEDIC SURGERY

## 2025-04-21 PROCEDURE — 250N000013 HC RX MED GY IP 250 OP 250 PS 637: Performed by: INTERNAL MEDICINE

## 2025-04-21 PROCEDURE — 250N000009 HC RX 250: Performed by: NURSE ANESTHETIST, CERTIFIED REGISTERED

## 2025-04-21 PROCEDURE — 370N000017 HC ANESTHESIA TECHNICAL FEE, PER MIN: Performed by: ORTHOPAEDIC SURGERY

## 2025-04-21 PROCEDURE — 85025 COMPLETE CBC W/AUTO DIFF WBC: CPT | Performed by: INTERNAL MEDICINE

## 2025-04-21 PROCEDURE — 710N000009 HC RECOVERY PHASE 1, LEVEL 1, PER MIN: Performed by: ORTHOPAEDIC SURGERY

## 2025-04-21 PROCEDURE — 0QS636Z REPOSITION RIGHT UPPER FEMUR WITH INTRAMEDULLARY INTERNAL FIXATION DEVICE, PERCUTANEOUS APPROACH: ICD-10-PCS | Performed by: ORTHOPAEDIC SURGERY

## 2025-04-21 PROCEDURE — 360N000084 HC SURGERY LEVEL 4 W/ FLUORO, PER MIN: Performed by: ORTHOPAEDIC SURGERY

## 2025-04-21 PROCEDURE — 250N000011 HC RX IP 250 OP 636: Mod: JZ

## 2025-04-21 PROCEDURE — 80048 BASIC METABOLIC PNL TOTAL CA: CPT | Performed by: INTERNAL MEDICINE

## 2025-04-21 DEVICE — K-WIRE: Type: IMPLANTABLE DEVICE | Site: HIP | Status: FUNCTIONAL

## 2025-04-21 DEVICE — LOCKING SCREW
Type: IMPLANTABLE DEVICE | Site: HIP | Status: FUNCTIONAL
Brand: T2 ALPHA

## 2025-04-21 DEVICE — LAG SCREW
Type: IMPLANTABLE DEVICE | Site: HIP | Status: FUNCTIONAL
Brand: GAMMA

## 2025-04-21 DEVICE — TROCHANTERIC NAIL
Type: IMPLANTABLE DEVICE | Site: HIP | Status: FUNCTIONAL
Brand: GAMMA

## 2025-04-21 DEVICE — PRECISION PIN TAPERED
Type: IMPLANTABLE DEVICE | Site: HIP | Status: FUNCTIONAL
Brand: GAMMA

## 2025-04-21 RX ORDER — ONDANSETRON 2 MG/ML
4 INJECTION INTRAMUSCULAR; INTRAVENOUS EVERY 6 HOURS PRN
Status: DISCONTINUED | OUTPATIENT
Start: 2025-04-21 | End: 2025-04-24 | Stop reason: HOSPADM

## 2025-04-21 RX ORDER — ENOXAPARIN SODIUM 100 MG/ML
40 INJECTION SUBCUTANEOUS EVERY 24 HOURS
Status: DISCONTINUED | OUTPATIENT
Start: 2025-04-22 | End: 2025-04-24 | Stop reason: HOSPADM

## 2025-04-21 RX ORDER — MAGNESIUM HYDROXIDE 1200 MG/15ML
LIQUID ORAL PRN
Status: DISCONTINUED | OUTPATIENT
Start: 2025-04-21 | End: 2025-04-21 | Stop reason: HOSPADM

## 2025-04-21 RX ORDER — NALOXONE HYDROCHLORIDE 0.4 MG/ML
0.4 INJECTION, SOLUTION INTRAMUSCULAR; INTRAVENOUS; SUBCUTANEOUS
Status: DISCONTINUED | OUTPATIENT
Start: 2025-04-21 | End: 2025-04-24 | Stop reason: HOSPADM

## 2025-04-21 RX ORDER — POLYETHYLENE GLYCOL 3350 17 G/17G
17 POWDER, FOR SOLUTION ORAL DAILY
Status: DISCONTINUED | OUTPATIENT
Start: 2025-04-22 | End: 2025-04-24 | Stop reason: HOSPADM

## 2025-04-21 RX ORDER — PROPOFOL 10 MG/ML
INJECTION, EMULSION INTRAVENOUS PRN
Status: DISCONTINUED | OUTPATIENT
Start: 2025-04-21 | End: 2025-04-21

## 2025-04-21 RX ORDER — HYDROMORPHONE HCL IN WATER/PF 6 MG/30 ML
0.4 PATIENT CONTROLLED ANALGESIA SYRINGE INTRAVENOUS EVERY 5 MIN PRN
Status: DISCONTINUED | OUTPATIENT
Start: 2025-04-21 | End: 2025-04-21 | Stop reason: HOSPADM

## 2025-04-21 RX ORDER — HYDROMORPHONE HCL IN WATER/PF 6 MG/30 ML
0.2 PATIENT CONTROLLED ANALGESIA SYRINGE INTRAVENOUS EVERY 5 MIN PRN
Status: DISCONTINUED | OUTPATIENT
Start: 2025-04-21 | End: 2025-04-21 | Stop reason: HOSPADM

## 2025-04-21 RX ORDER — ONDANSETRON 2 MG/ML
INJECTION INTRAMUSCULAR; INTRAVENOUS PRN
Status: DISCONTINUED | OUTPATIENT
Start: 2025-04-21 | End: 2025-04-21

## 2025-04-21 RX ORDER — SODIUM CHLORIDE, SODIUM LACTATE, POTASSIUM CHLORIDE, CALCIUM CHLORIDE 600; 310; 30; 20 MG/100ML; MG/100ML; MG/100ML; MG/100ML
INJECTION, SOLUTION INTRAVENOUS CONTINUOUS
Status: DISCONTINUED | OUTPATIENT
Start: 2025-04-21 | End: 2025-04-21 | Stop reason: HOSPADM

## 2025-04-21 RX ORDER — METHOCARBAMOL 500 MG/1
500 TABLET, FILM COATED ORAL 4 TIMES DAILY PRN
Status: DISCONTINUED | OUTPATIENT
Start: 2025-04-21 | End: 2025-04-24 | Stop reason: HOSPADM

## 2025-04-21 RX ORDER — NALOXONE HYDROCHLORIDE 0.4 MG/ML
0.1 INJECTION, SOLUTION INTRAMUSCULAR; INTRAVENOUS; SUBCUTANEOUS
Status: DISCONTINUED | OUTPATIENT
Start: 2025-04-21 | End: 2025-04-21 | Stop reason: HOSPADM

## 2025-04-21 RX ORDER — ONDANSETRON 2 MG/ML
4 INJECTION INTRAMUSCULAR; INTRAVENOUS EVERY 30 MIN PRN
Status: DISCONTINUED | OUTPATIENT
Start: 2025-04-21 | End: 2025-04-21 | Stop reason: HOSPADM

## 2025-04-21 RX ORDER — AMOXICILLIN 250 MG
1 CAPSULE ORAL 2 TIMES DAILY
Status: DISCONTINUED | OUTPATIENT
Start: 2025-04-21 | End: 2025-04-24 | Stop reason: HOSPADM

## 2025-04-21 RX ORDER — FENTANYL CITRATE 50 UG/ML
INJECTION, SOLUTION INTRAMUSCULAR; INTRAVENOUS PRN
Status: DISCONTINUED | OUTPATIENT
Start: 2025-04-21 | End: 2025-04-21

## 2025-04-21 RX ORDER — TRANEXAMIC ACID 10 MG/ML
1 INJECTION, SOLUTION INTRAVENOUS ONCE
Status: DISCONTINUED | OUTPATIENT
Start: 2025-04-21 | End: 2025-04-21 | Stop reason: HOSPADM

## 2025-04-21 RX ORDER — NALOXONE HYDROCHLORIDE 0.4 MG/ML
0.2 INJECTION, SOLUTION INTRAMUSCULAR; INTRAVENOUS; SUBCUTANEOUS
Status: DISCONTINUED | OUTPATIENT
Start: 2025-04-21 | End: 2025-04-24 | Stop reason: HOSPADM

## 2025-04-21 RX ORDER — HYDROMORPHONE HCL IN WATER/PF 6 MG/30 ML
0.2 PATIENT CONTROLLED ANALGESIA SYRINGE INTRAVENOUS
Status: DISCONTINUED | OUTPATIENT
Start: 2025-04-21 | End: 2025-04-24 | Stop reason: HOSPADM

## 2025-04-21 RX ORDER — LIDOCAINE HYDROCHLORIDE 20 MG/ML
INJECTION, SOLUTION INFILTRATION; PERINEURAL PRN
Status: DISCONTINUED | OUTPATIENT
Start: 2025-04-21 | End: 2025-04-21

## 2025-04-21 RX ORDER — PROCHLORPERAZINE MALEATE 5 MG/1
5 TABLET ORAL EVERY 6 HOURS PRN
Status: DISCONTINUED | OUTPATIENT
Start: 2025-04-21 | End: 2025-04-24 | Stop reason: HOSPADM

## 2025-04-21 RX ORDER — ONDANSETRON 4 MG/1
4 TABLET, ORALLY DISINTEGRATING ORAL EVERY 6 HOURS PRN
Status: DISCONTINUED | OUTPATIENT
Start: 2025-04-21 | End: 2025-04-24 | Stop reason: HOSPADM

## 2025-04-21 RX ORDER — CEFAZOLIN SODIUM 2 G/50ML
2 SOLUTION INTRAVENOUS EVERY 8 HOURS
Status: COMPLETED | OUTPATIENT
Start: 2025-04-21 | End: 2025-04-22

## 2025-04-21 RX ORDER — FENTANYL CITRATE 0.05 MG/ML
50 INJECTION, SOLUTION INTRAMUSCULAR; INTRAVENOUS EVERY 5 MIN PRN
Status: DISCONTINUED | OUTPATIENT
Start: 2025-04-21 | End: 2025-04-21 | Stop reason: HOSPADM

## 2025-04-21 RX ORDER — LIDOCAINE 40 MG/G
CREAM TOPICAL
Status: DISCONTINUED | OUTPATIENT
Start: 2025-04-21 | End: 2025-04-24 | Stop reason: HOSPADM

## 2025-04-21 RX ORDER — SODIUM CHLORIDE, SODIUM LACTATE, POTASSIUM CHLORIDE, CALCIUM CHLORIDE 600; 310; 30; 20 MG/100ML; MG/100ML; MG/100ML; MG/100ML
INJECTION, SOLUTION INTRAVENOUS CONTINUOUS
Status: DISCONTINUED | OUTPATIENT
Start: 2025-04-21 | End: 2025-04-24 | Stop reason: HOSPADM

## 2025-04-21 RX ORDER — CEFAZOLIN SODIUM/WATER 2 G/20 ML
2 SYRINGE (ML) INTRAVENOUS SEE ADMIN INSTRUCTIONS
Status: DISCONTINUED | OUTPATIENT
Start: 2025-04-21 | End: 2025-04-21 | Stop reason: HOSPADM

## 2025-04-21 RX ORDER — HYDROMORPHONE HCL IN WATER/PF 6 MG/30 ML
0.4 PATIENT CONTROLLED ANALGESIA SYRINGE INTRAVENOUS
Status: DISCONTINUED | OUTPATIENT
Start: 2025-04-21 | End: 2025-04-24 | Stop reason: HOSPADM

## 2025-04-21 RX ORDER — OXYCODONE HYDROCHLORIDE 5 MG/1
10 TABLET ORAL EVERY 4 HOURS PRN
Status: DISCONTINUED | OUTPATIENT
Start: 2025-04-21 | End: 2025-04-24 | Stop reason: HOSPADM

## 2025-04-21 RX ORDER — ACETAMINOPHEN 325 MG/1
975 TABLET ORAL EVERY 8 HOURS
Status: DISCONTINUED | OUTPATIENT
Start: 2025-04-21 | End: 2025-04-24 | Stop reason: HOSPADM

## 2025-04-21 RX ORDER — DEXAMETHASONE SODIUM PHOSPHATE 4 MG/ML
4 INJECTION, SOLUTION INTRA-ARTICULAR; INTRALESIONAL; INTRAMUSCULAR; INTRAVENOUS; SOFT TISSUE
Status: DISCONTINUED | OUTPATIENT
Start: 2025-04-21 | End: 2025-04-21 | Stop reason: HOSPADM

## 2025-04-21 RX ORDER — FENTANYL CITRATE 0.05 MG/ML
25 INJECTION, SOLUTION INTRAMUSCULAR; INTRAVENOUS EVERY 5 MIN PRN
Status: DISCONTINUED | OUTPATIENT
Start: 2025-04-21 | End: 2025-04-21 | Stop reason: HOSPADM

## 2025-04-21 RX ORDER — OXYCODONE HYDROCHLORIDE 5 MG/1
5 TABLET ORAL EVERY 4 HOURS PRN
Status: DISCONTINUED | OUTPATIENT
Start: 2025-04-21 | End: 2025-04-24 | Stop reason: HOSPADM

## 2025-04-21 RX ORDER — DEXAMETHASONE SODIUM PHOSPHATE 4 MG/ML
INJECTION, SOLUTION INTRA-ARTICULAR; INTRALESIONAL; INTRAMUSCULAR; INTRAVENOUS; SOFT TISSUE PRN
Status: DISCONTINUED | OUTPATIENT
Start: 2025-04-21 | End: 2025-04-21

## 2025-04-21 RX ORDER — TRANEXAMIC ACID 10 MG/ML
1 INJECTION, SOLUTION INTRAVENOUS ONCE
Status: COMPLETED | OUTPATIENT
Start: 2025-04-21 | End: 2025-04-21

## 2025-04-21 RX ORDER — CEFAZOLIN SODIUM/WATER 2 G/20 ML
2 SYRINGE (ML) INTRAVENOUS
Status: COMPLETED | OUTPATIENT
Start: 2025-04-21 | End: 2025-04-21

## 2025-04-21 RX ORDER — POTASSIUM CHLORIDE 1500 MG/1
40 TABLET, EXTENDED RELEASE ORAL ONCE
Status: COMPLETED | OUTPATIENT
Start: 2025-04-21 | End: 2025-04-21

## 2025-04-21 RX ORDER — ONDANSETRON 4 MG/1
4 TABLET, ORALLY DISINTEGRATING ORAL EVERY 30 MIN PRN
Status: DISCONTINUED | OUTPATIENT
Start: 2025-04-21 | End: 2025-04-21 | Stop reason: HOSPADM

## 2025-04-21 RX ADMIN — FENTANYL CITRATE 50 MCG: 50 INJECTION, SOLUTION INTRAMUSCULAR; INTRAVENOUS at 13:35

## 2025-04-21 RX ADMIN — DEXAMETHASONE SODIUM PHOSPHATE 4 MG: 4 INJECTION, SOLUTION INTRA-ARTICULAR; INTRALESIONAL; INTRAMUSCULAR; INTRAVENOUS; SOFT TISSUE at 11:25

## 2025-04-21 RX ADMIN — SODIUM CHLORIDE, SODIUM LACTATE, POTASSIUM CHLORIDE, AND CALCIUM CHLORIDE: .6; .31; .03; .02 INJECTION, SOLUTION INTRAVENOUS at 11:12

## 2025-04-21 RX ADMIN — PHENYLEPHRINE HYDROCHLORIDE 100 MCG: 10 INJECTION INTRAVENOUS at 11:21

## 2025-04-21 RX ADMIN — FENTANYL CITRATE 50 MCG: 50 INJECTION, SOLUTION INTRAMUSCULAR; INTRAVENOUS at 13:11

## 2025-04-21 RX ADMIN — POTASSIUM CHLORIDE 40 MEQ: 1500 TABLET, EXTENDED RELEASE ORAL at 03:22

## 2025-04-21 RX ADMIN — TRANEXAMIC ACID 1 G: 10 INJECTION, SOLUTION INTRAVENOUS at 11:20

## 2025-04-21 RX ADMIN — OXYCODONE HYDROCHLORIDE 5 MG: 5 TABLET ORAL at 08:16

## 2025-04-21 RX ADMIN — LATANOPROST 1 DROP: 50 SOLUTION OPHTHALMIC at 19:44

## 2025-04-21 RX ADMIN — LEVOTHYROXINE SODIUM 137 MCG: 137 TABLET ORAL at 08:16

## 2025-04-21 RX ADMIN — FENTANYL CITRATE 50 MCG: 50 INJECTION INTRAMUSCULAR; INTRAVENOUS at 11:31

## 2025-04-21 RX ADMIN — DEXMEDETOMIDINE HYDROCHLORIDE 12 MCG: 100 INJECTION, SOLUTION INTRAVENOUS at 11:51

## 2025-04-21 RX ADMIN — ACETAMINOPHEN 650 MG: 325 TABLET, FILM COATED ORAL at 08:16

## 2025-04-21 RX ADMIN — POLYETHYLENE GLYCOL 3350 17 G: 17 POWDER, FOR SOLUTION ORAL at 08:15

## 2025-04-21 RX ADMIN — OXYCODONE HYDROCHLORIDE 5 MG: 5 TABLET ORAL at 19:38

## 2025-04-21 RX ADMIN — TIMOLOL MALEATE 1 DROP: 5 SOLUTION OPHTHALMIC at 00:55

## 2025-04-21 RX ADMIN — CEFAZOLIN SODIUM 2 G: 2 SOLUTION INTRAVENOUS at 19:27

## 2025-04-21 RX ADMIN — TIMOLOL MALEATE 1 DROP: 5 SOLUTION OPHTHALMIC at 19:45

## 2025-04-21 RX ADMIN — SENNOSIDES AND DOCUSATE SODIUM 1 TABLET: 50; 8.6 TABLET ORAL at 19:28

## 2025-04-21 RX ADMIN — ACETAMINOPHEN 650 MG: 325 TABLET, FILM COATED ORAL at 19:28

## 2025-04-21 RX ADMIN — ONDANSETRON 4 MG: 2 INJECTION INTRAMUSCULAR; INTRAVENOUS at 11:15

## 2025-04-21 RX ADMIN — FENTANYL CITRATE 50 MCG: 50 INJECTION INTRAMUSCULAR; INTRAVENOUS at 11:14

## 2025-04-21 RX ADMIN — HYDROMORPHONE HYDROCHLORIDE 0.5 MG: 1 INJECTION, SOLUTION INTRAMUSCULAR; INTRAVENOUS; SUBCUTANEOUS at 00:54

## 2025-04-21 RX ADMIN — TIMOLOL MALEATE 1 DROP: 5 SOLUTION OPHTHALMIC at 08:16

## 2025-04-21 RX ADMIN — HYDROMORPHONE HYDROCHLORIDE 0.4 MG: 0.2 INJECTION, SOLUTION INTRAMUSCULAR; INTRAVENOUS; SUBCUTANEOUS at 13:48

## 2025-04-21 RX ADMIN — PROPOFOL 200 MG: 10 INJECTION, EMULSION INTRAVENOUS at 11:14

## 2025-04-21 RX ADMIN — LIDOCAINE HYDROCHLORIDE 60 MG: 20 INJECTION, SOLUTION INFILTRATION; PERINEURAL at 11:14

## 2025-04-21 RX ADMIN — Medication 2 G: at 11:12

## 2025-04-21 RX ADMIN — Medication 20 ML: at 11:00

## 2025-04-21 RX ADMIN — SODIUM CHLORIDE, SODIUM LACTATE, POTASSIUM CHLORIDE, AND CALCIUM CHLORIDE: .6; .31; .03; .02 INJECTION, SOLUTION INTRAVENOUS at 20:58

## 2025-04-21 RX ADMIN — HYDROMORPHONE HYDROCHLORIDE 0.4 MG: 0.2 INJECTION, SOLUTION INTRAMUSCULAR; INTRAVENOUS; SUBCUTANEOUS at 16:38

## 2025-04-21 ASSESSMENT — ACTIVITIES OF DAILY LIVING (ADL)
ADLS_ACUITY_SCORE: 65
ADLS_ACUITY_SCORE: 67
ADLS_ACUITY_SCORE: 67
ADLS_ACUITY_SCORE: 71
ADLS_ACUITY_SCORE: 71
ADLS_ACUITY_SCORE: 67
ADLS_ACUITY_SCORE: 65
ADLS_ACUITY_SCORE: 67
ADLS_ACUITY_SCORE: 65
ADLS_ACUITY_SCORE: 71
ADLS_ACUITY_SCORE: 67
ADLS_ACUITY_SCORE: 65
ADLS_ACUITY_SCORE: 71
ADLS_ACUITY_SCORE: 67
ADLS_ACUITY_SCORE: 65
ADLS_ACUITY_SCORE: 71
ADLS_ACUITY_SCORE: 67
ADLS_ACUITY_SCORE: 65
ADLS_ACUITY_SCORE: 65
ADLS_ACUITY_SCORE: 71
ADLS_ACUITY_SCORE: 65

## 2025-04-21 NOTE — CONSULTS
[facility]    Orthopedic Consultation    Abundio Beckett MRN# [id number]   Age: 89 year old YOB: 1935     Date of Admission:  4/20/2025    Reason for consult: Right hip intertrochanteric fracture       Requesting provider: KING Corbin       Level of consult: Consult, follow and place orders           Assessment and Plan:   Assessment:   Right hip intertrochanteric fracture      Plan:   Patient scheduled for a right hip IM Nail later today.    Surgeon: Dr Junior  NPO Status effective now   NWB/Bedrest until postop  Vit D level ordered   Hold anticoagulants if taken:   Pain medication as needed, minimize narcotics as able             Chief Complaint:   Right hip pain          History of Present Illness:   Abundio Beckett is a 89 year old male with history including HTN; hypothyroidism; h/o CVA; who presented to the ED following a fall.   Patient suffered a mechanical fall at his apartment yesterday. States he stumbled on the carpet while walking in the hallway and fell onto his right hip. He had significant right hip pain and was unable to ambulate. Brought to [facility] for further evaluation.  He ambulates independently at baseline with use of a cane when out of the house.            Past Medical History:     Past Medical History:   Diagnosis Date    Basal cell carcinoma     BENIGN PROSTATIC HYPERTROPHY 9/17/2002    Essential hypertension, benign 11/8/2016    Hyperlipidemia LDL goal <100 5/29/2012    Hypertrophy (benign) of prostate     abstracted 7/5/02.    HYPOTHYROIDISM NOS 9/17/2002    Lacunar infarction (H) 9/4/2012    Unspecified hypothyroidism     abstracted 7/5/02.             Past Surgical History:     Past Surgical History:   Procedure Laterality Date    HC REPAIR OF NASAL SEPTUM      deviated septum surgery    HC SHOULDER ARTHROSCOPY, DX      ZZC REMV PROSTATE,PERINEAL,RADICAL  01/01/2000             Social History:     Social History     Tobacco Use    Smoking  status: Never    Smokeless tobacco: Never   Substance Use Topics    Alcohol use: Yes     Comment: Occasional wine or Margarita.             Family History:   No family history on file.          Immunizations:     VACCINE/DOSE   Diptheria   DPT   DTAP   HBIG   Hepatitis A   Hepatitis B   HIB   Influenza   Measles   Meningococcal   MMR   Mumps   Pneumococcal   Polio   Rubella   Small Pox   TDAP   Varicella   Zoster             Allergies:     Allergies   Allergen Reactions    Pravachol [Pravastatin] Muscle Pain (Myalgia)    Simvastatin              Medications:     Current Facility-Administered Medications   Medication Dose Route Frequency Provider Last Rate Last Admin    acetaminophen (TYLENOL) tablet 650 mg  650 mg Oral Q4H PRN Sagar Corbin MD        Or    acetaminophen (TYLENOL) Suppository 650 mg  650 mg Rectal Q4H PRN Sagar Corbin MD        amLODIPine (NORVASC) tablet 5 mg  5 mg Oral Daily Sagar Corbin MD        calcium carbonate (TUMS) chewable tablet 1,000 mg  1,000 mg Oral 4x Daily PRN Sagar Corbin MD        HYDROmorphone (PF) (DILAUDID) injection 0.3-0.5 mg  0.3-0.5 mg Intravenous Q4H PRN Sagar Corbin MD   0.5 mg at 04/21/25 0054    ketoconazole (NIZORAL) 2 % cream   Topical Daily Sagar Corbin MD        latanoprost (XALATAN) 0.005 % ophthalmic solution 1 drop  1 drop Both Eyes QPM Sagar Corbin MD        levothyroxine (SYNTHROID/LEVOTHROID) tablet 137 mcg  137 mcg Oral Daily Sagar Corbin MD        lidocaine (LMX4) cream   Topical Q1H PRN Sagar Corbin MD        lidocaine 1 % 0.1-1 mL  0.1-1 mL Other Q1H PRN Sagar Corbin MD        naloxone (NARCAN) injection 0.2 mg  0.2 mg Intravenous Q2 Min PRN Sagar Corbin MD        Or    naloxone (NARCAN) injection 0.4 mg  0.4 mg Intravenous Q2 Min PRN Sagar Corbin MD        Or    naloxone (NARCAN) injection 0.2 mg  0.2 mg Intramuscular Q2 Min PRN Sagar Corbin MD         Or    naloxone (NARCAN) injection 0.4 mg  0.4 mg Intramuscular Q2 Min PRN Sagar Corbin MD        ondansetron (ZOFRAN ODT) ODT tab 4 mg  4 mg Oral Q6H PRN Sagar Corbin MD        Or    ondansetron (ZOFRAN) injection 4 mg  4 mg Intravenous Q6H PRN Sagar Corbin MD        oxyCODONE (ROXICODONE) tablet 5 mg  5 mg Oral Q4H PRN Sagar Corbin MD        polyethylene glycol (MIRALAX) Packet 17 g  17 g Oral BID PRN Sagar Corbin MD        prochlorperazine (COMPAZINE) injection 5 mg  5 mg Intravenous Q6H PRN aSgar Corbin MD        Or    prochlorperazine (COMPAZINE) tablet 5 mg  5 mg Oral Q6H PRN Sagar Corbin MD        senna-docusate (SENOKOT-S/PERICOLACE) 8.6-50 MG per tablet 1 tablet  1 tablet Oral BID PRN Sagar Corbin MD        Or    senna-docusate (SENOKOT-S/PERICOLACE) 8.6-50 MG per tablet 2 tablet  2 tablet Oral BID PRN Sagar Corbin MD        sodium chloride (PF) 0.9% PF flush 3 mL  3 mL Intracatheter Q8H Sagar Corbin MD   3 mL at 04/21/25 0055    sodium chloride (PF) 0.9% PF flush 3 mL  3 mL Intracatheter q1 min prn Sagar Corbin MD        timolol maleate (TIMOPTIC) 0.5 % ophthalmic solution 1 drop  1 drop Both Eyes BID Sagar Corbin MD   1 drop at 04/21/25 0055             Review of Systems:   ROS:  10 point ROS neg other than the symptoms noted above in the HPI.            Physical Exam:   All vitals have been reviewed  Patient Vitals for the past 24 hrs:   BP Temp Temp src Pulse Resp SpO2 Weight   04/21/25 0322 (!) 147/85 98.6  F (37  C) Oral 75 18 96 % --   04/21/25 0100 -- -- -- -- -- -- 98 kg (216 lb 0.8 oz)   04/20/25 2335 (!) 151/81 97.7  F (36.5  C) Oral 70 16 95 % --   04/20/25 2200 (!) 165/86 -- -- 72 -- 94 % --   04/20/25 2034 (!) 152/95 99.1  F (37.3  C) Oral 73 16 93 % --       Intake/Output Summary (Last 24 hours) at 4/21/2025 0722  Last data filed at 4/21/2025 0720  Gross per 24 hour   Intake --    Output 400 ml   Net -400 ml         Physical Exam   Temp: 98.6  F (37  C) Temp src: Oral BP: (!) 147/85 Pulse: 75   Resp: 18 SpO2: 96 % O2 Device: None (Room air)    Vital Signs with Ranges  Temp:  [97.7  F (36.5  C)-99.1  F (37.3  C)] 98.6  F (37  C)  Pulse:  [70-75] 75  Resp:  [16-18] 18  BP: (147-165)/(81-95) 147/85  SpO2:  [93 %-96 %] 96 %  216 lbs .81 oz    Constitutional: Pleasant, alert, appropriate, following commands.  HEENT: Head atraumatic normocephalic.   Respiratory: Unlabored breathing no audible wheeze  Cardiovascular: Regular rate and rhythm per pulses  GI: Abdomen non-distended.  Lymph/Hematologic: No lymphadenopathy in areas examined  Genitourinary:  No jones  Skin: No rashes, no cyanosis, no edema.  Musculoskeletal: On physical exam of the left lower extremity, patient's leg is resting in a shortened and externally rotated position.  No skin abrasions seen at the hip.  Patient is able to dorsi and plantarflex both ankles.  Reports equal sensation to light touch on the left versus right lower extremities.  Distal pulses are intact and equal bilaterally.      Neurologic: normal without focal findings, speech normal, alert and oriented x iii  Neuropsychiatric: stable            Data:   All laboratory data reviewed  Results for orders placed or performed during the hospital encounter of 04/20/25   CT Head w/o Contrast     Status: None    Narrative    EXAM: CT HEAD W/O CONTRAST  LOCATION: [facility]  DATE: 4/20/2025    INDICATION: fall with head injury  COMPARISON: MRI brain 6/6/2016, CT head 9/10/2016  TECHNIQUE: Routine CT Head without IV contrast. Multiplanar reformats. Dose reduction techniques were used.    FINDINGS:  INTRACRANIAL CONTENTS: No intracranial hemorrhage, extraaxial collection, or mass effect.  No CT evidence of acute infarct. Moderate to severe presumed chronic small vessel ischemic changes. Moderate generalized volume loss. No hydrocephalus. Left    thalamus small chronic lacunar infarct redemonstrated.    VISUALIZED ORBITS/SINUSES/MASTOIDS: No intraorbital abnormality. Mild mucosal thickening scattered about the paranasal sinuses. Right maxillary sinus small retention cyst/mucosal polyp. No middle ear or mastoid effusion.    BONES/SOFT TISSUES: No acute abnormality.      Impression    IMPRESSION:  1.  No acute intracranial process.    2.  Chronic intracranial changes described above.   XR Pelvis w Hip Right 1 View     Status: None    Narrative    EXAM: XR PELVIS AND HIP RIGHT 1 VIEW  LOCATION: [facility]  DATE: 4/20/2025    INDICATION: Fall, hip pain and likely fracture.  COMPARISON: None.      Impression    IMPRESSION: Acute displaced and angulated right proximal femoral fracture with intertrochanteric involvement. The fracture extends towards the base of the femoral neck. Bones are demineralized and there is mild right hip arthrosis. Surgical clips in the   pelvis.    NOTE: ABNORMAL REPORT    THE DICTATION ABOVE DESCRIBES AN ABNORMALITY FOR WHICH FOLLOW-UP IS NEEDED.    [place] Draw     Status: None    Narrative    The following orders were created for panel order [place] Draw.  Procedure                               Abnormality         Status                     ---------                               -----------         ------                     Extra Green Top (Lithiu...[[id number]]                      Final result               Extra Purple Top Tube[[id number]]                           Final result                 Please view results for these tests on the individual orders.   Extra Green Top (Lithium Heparin) Tube     Status: None   Result Value Ref Range    Hold Specimen JIC    Extra Purple Top Tube     Status: None   Result Value Ref Range    Hold Specimen JIC    Basic metabolic panel     Status: Abnormal   Result Value Ref Range    Sodium 138 135 - 145 mmol/L    Potassium 3.3 (L) 3.4 - 5.3 mmol/L    Chloride 101 98 -  107 mmol/L    Carbon Dioxide (CO2) 27 22 - 29 mmol/L    Anion Gap 10 7 - 15 mmol/L    Urea Nitrogen 16.5 8.0 - 23.0 mg/dL    Creatinine 0.87 0.67 - 1.17 mg/dL    GFR Estimate 82 >60 mL/min/1.73m2    Calcium 8.9 8.8 - 10.4 mg/dL    Glucose 135 (H) 70 - 99 mg/dL   CBC with platelets and differential     Status: Abnormal   Result Value Ref Range    WBC Count 6.2 4.0 - 11.0 10e3/uL    RBC Count 4.27 (L) 4.40 - 5.90 10e6/uL    Hemoglobin 14.0 13.3 - 17.7 g/dL    Hematocrit 41.0 40.0 - 53.0 %    MCV 96 78 - 100 fL    MCH 32.8 26.5 - 33.0 pg    MCHC 34.1 31.5 - 36.5 g/dL    RDW 12.6 10.0 - 15.0 %    Platelet Count 146 (L) 150 - 450 10e3/uL    % Neutrophils 61 %    % Lymphocytes 26 %    % Monocytes 11 %    % Eosinophils 1 %    % Basophils 1 %    % Immature Granulocytes 1 %    NRBCs per 100 WBC 0 <1 /100    Absolute Neutrophils 3.8 1.6 - 8.3 10e3/uL    Absolute Lymphocytes 1.7 0.8 - 5.3 10e3/uL    Absolute Monocytes 0.7 0.0 - 1.3 10e3/uL    Absolute Eosinophils 0.1 0.0 - 0.7 10e3/uL    Absolute Basophils 0.0 0.0 - 0.2 10e3/uL    Absolute Immature Granulocytes 0.0 <=0.4 10e3/uL    Absolute NRBCs 0.0 10e3/uL   EKG 12-lead, tracing only     Status: None (Preliminary result)   Result Value Ref Range    Systolic Blood Pressure  mmHg    Diastolic Blood Pressure  mmHg    Ventricular Rate 74 BPM    Atrial Rate 74 BPM    FL Interval 192 ms    QRS Duration 92 ms     ms    QTc 459 ms    P Axis 72 degrees    R AXIS -44 degrees    T Axis 53 degrees    Interpretation ECG       Sinus rhythm  Left axis deviation  Abnormal ECG  When compared with ECG of 10-Sep-2016 19:01,  No significant change was found     CBC with platelets differential     Status: Abnormal    Narrative    The following orders were created for panel order CBC with platelets differential.  Procedure                               Abnormality         Status                     ---------                               -----------         ------                     CBC  with platelets and ...[[id number]]  Abnormal            Final result               RBC and Platelet Morpho...[[id number]]                                                   Please view results for these tests on the individual orders.          Attestation:  I have reviewed today's vital signs, notes, medications, labs and imaging with Dr. Junior.  Amount of time performed on this consult: 50 minutes.    Solange Pollock PA-C

## 2025-04-21 NOTE — PROGRESS NOTES
RECEIVING UNIT ED HANDOFF REVIEW    ED Nurse Handoff Report was reviewed by: Milton Barreto RN on April 20, 2025 at 11:18 PM

## 2025-04-21 NOTE — ANESTHESIA PROCEDURE NOTES
Airway       Patient location: [facility] - Operating Room or Procedural Area.  Staff -        Anesthesiologist:  Anni Goldsmith MD       CRNA: Ally Dowling APRN CRNA       Performed By: CRNA  Consent for Airway        Urgency: elective  Indications and Patient Condition       Indications for airway management: malika-procedural       Induction type:intravenous       Mask difficulty assessment: 0 - not attempted    Final Airway Details       Final airway type: supraglottic airway    Supraglottic Airway Details        Type: LMA       Brand: I-Gel       LMA size: 5    Post intubation assessment        Placement verified by: capnometry, equal breath sounds and chest rise        Number of attempts at approach: 1       Number of other approaches attempted: 0       Secured with: tape       Ease of procedure: easy       Dentition: Intact and Unchanged

## 2025-04-21 NOTE — ED NOTES
[facility]  ED Nurse Handoff Report    ED Chief complaint: Fall and Hip Pain      ED Diagnosis:   Final diagnoses:   None       Code Status: Inpatient team to discuss code status with patient    Allergies:   Allergies   Allergen Reactions    Pravachol [Pravastatin] Muscle Pain (Myalgia)    Simvastatin        Patient Story: Pt arrived via EMS from assisted living community. Pt stumbled and fell while ambulating in a hallway. States he only occasionally needs to use a cane and today's fall was simply mechanical, denies dizziness, weakness, or other contributing factor. Fell onto R hip with severe pain. Unable to straighten leg on arrival. Was placed in c-collar by EMS out of precaution, denies head or neck pain. GCS 15.    During ED course, pt's pain responded well to IV morphine. C-collar removed after MD exam.     Triage Assessment (Adult)       Row Name 04/20/25 2035          Triage Assessment    Airway WDL WDL        Respiratory WDL    Respiratory WDL WDL        Skin Circulation/Temperature WDL    Skin Circulation/Temperature WDL WDL        Cardiac WDL    Cardiac WDL WDL        Peripheral/Neurovascular WDL    Peripheral Neurovascular WDL WDL        Cognitive/Neuro/Behavioral WDL    Cognitive/Neuro/Behavioral WDL WDL                         Focused Assessment:   Abnormal Labs Resulted from Time of ED Arrival to Time of ED Departure   BASIC METABOLIC PANEL - Abnormal       Result Value    Sodium 138      Potassium 3.3 (*)     Chloride 101      Carbon Dioxide (CO2) 27      Anion Gap 10      Urea Nitrogen 16.5      Creatinine 0.87      GFR Estimate 82      Calcium 8.9      Glucose 135 (*)    CBC WITH PLATELETS AND DIFFERENTIAL - Abnormal    WBC Count 6.2      RBC Count 4.27 (*)     Hemoglobin 14.0      Hematocrit 41.0      MCV 96      MCH 32.8      MCHC 34.1      RDW 12.6      Platelet Count 146 (*)     % Neutrophils 61      % Lymphocytes 26      % Monocytes 11      % Eosinophils 1      % Basophils 1       % Immature Granulocytes 1      NRBCs per 100 WBC 0      Absolute Neutrophils 3.8      Absolute Lymphocytes 1.7      Absolute Monocytes 0.7      Absolute Eosinophils 0.1      Absolute Basophils 0.0      Absolute Immature Granulocytes 0.0      Absolute NRBCs 0.0         CT Head w/o Contrast   Final Result   IMPRESSION:   1.  No acute intracranial process.      2.  Chronic intracranial changes described above.      XR Pelvis w Hip Right 1 View   Final Result   IMPRESSION: Acute displaced and angulated right proximal femoral fracture with intertrochanteric involvement. The fracture extends towards the base of the femoral neck. Bones are demineralized and there is mild right hip arthrosis. Surgical clips in the    pelvis.      NOTE: ABNORMAL REPORT      THE DICTATION ABOVE DESCRIBES AN ABNORMALITY FOR WHICH FOLLOW-UP IS NEEDED.           Treatments and/or interventions provided:   Medications   morphine (PF) injection 4 mg (4 mg Intravenous $Given 4/20/25 2120)   potassium chloride sandro ER (KLOR-CON M20) CR tablet 40 mEq (40 mEq Oral $Given 4/20/25 2156)   morphine (PF) injection 4 mg (4 mg Intravenous $Given 4/20/25 2214)       Patient's response to treatments and/or interventions: Improving    To be done/followed up on inpatient unit:  Follow Plan of Care    Does this patient have any cognitive concerns?: A&Ox4    Activity level - Baseline/Home:  Independent  Activity Level - Current:   Total Care, R Femoral neck fracture    Patient's Preferred language: English   Needed?: No    Isolation: None  Infection: Not Applicable  Patient tested for COVID 19 prior to admission: NO  Bariatric?: No    Vital Signs:   Vitals:    04/20/25 2034 04/20/25 2200   BP: (!) 152/95 (!) 165/86   Pulse: 73 72   Resp: 16    Temp: 99.1  F (37.3  C)    TempSrc: Oral    SpO2: 93% 94%       Cardiac Rhythm:     Was the PSS-3 completed:   Yes  What interventions are required if any?               Family Comments: Family at  Bedside  OBS brochure/video discussed/provided to patient/family: No    For the majority of the shift this patient's behavior was Green.   Behavioral interventions performed were None.    ED NURSE PHONE NUMBER: [phone]

## 2025-04-21 NOTE — ANESTHESIA PROCEDURE NOTES
Fascia iliaca Procedure Note    Pre-Procedure   Staff -        Anesthesiologist:  Anni Goldsmith MD       Performed By: anesthesiologist       Location: pre-op       Pre-Anesthestic Checklist: patient identified, IV checked, site marked, risks and benefits discussed, informed consent, monitors and equipment checked, pre-op evaluation, at physician/surgeon's request and post-op pain management  Timeout:       Correct Patient: Yes        Correct Procedure: Yes        Correct Site: Yes        Correct Position: Yes        Correct Laterality: Yes        Site Marked: Yes  Procedure Documentation  Procedure: Fascia iliaca         Laterality: right       Patient Position: supine       Skin prep: Chloraprep       Local skin infiltrated with 1 mL of 1% lidocaine. lower extremity plane block       Needle Type: insulated       Needle Gauge: 21.        Needle Length (millimeters): 90        Ultrasound guided       1. Ultrasound was used to identify targeted nerve, plexus, vascular marker, or fascial plane and place a needle adjacent to it in real-time.       2. Ultrasound was used to visualize the spread of anesthetic in close proximity to the above referenced structure.       3. A permanent image is entered into the patient's record.       4. The visualized anatomic structures appeared normal.       5. There were no apparent abnormal pathologic findings.    Assessment/Narrative         The placement was negative for: blood aspirated, painful injection and site bleeding       Paresthesias: No.       Bolus given via needle..        Secured via.        Insertion/Infusion Method: Single Shot       Complications: none    Medication(s) Administered   Ropivacaine 0.5% w/ 1:400K Epi (Injection) - Injection   20 mL - 4/21/2025 11:00:00 AM   Comments:  Bolus via needle, 20 mL of 0.5% ropivacaine with 1:400,000 epinephrine.    Under ultrasound guidance, a 21 gauge needle was inserted and placed in close proximity to the fascia iliaca  "plane. Ultrasound was also used to visualize the spread of anesthetic in close proximity to the nerve being blocked. The nerve appeared anatomically normal, and there were no apparent abnormal pathological findings. Patient tolerated well, was mildly sedated but communicative throughout the procedure. A permanent ultrasound image was saved in the patient's record.    The surgeon has given a verbal order transferring care of this patient to me for the performance of regional analgesia block for post op pain control. It is requested of me because I am uniquely trained and qualified to perform this block and the surgeon is neither trained nor qualified to perform this procedure.         FOR [facility] ([facility]) ONLY:   Pain Team Contact information: please page the Pain Team Via [employer] ????om. Search \"Pain\". During daytime hours, please page the attending first. At night please page the resident first.      "

## 2025-04-21 NOTE — ANESTHESIA POSTPROCEDURE EVALUATION
Patient: Abundio Beckett    Procedure: Procedure(s):  Right hip/femur cephalomedullary nail       Anesthesia Type:  General    Note:  Disposition: Admission   Postop Pain Control: Uneventful            Sign Out: Well controlled pain   PONV: No   Neuro/Psych: Uneventful            Sign Out: Acceptable/Baseline neuro status   Airway/Respiratory: Uneventful            Sign Out: Acceptable/Baseline resp. status   CV/Hemodynamics: Uneventful            Sign Out: Acceptable CV status; No obvious hypovolemia; No obvious fluid overload   Other NRE: NONE   DID A NON-ROUTINE EVENT OCCUR? No           Last vitals:  Vitals Value Taken Time   /79 04/21/25 1345   Temp 35.4  C (95.72  F) 04/21/25 1254   Pulse 61 04/21/25 1345   Resp 23 04/21/25 1307   SpO2 94 % 04/21/25 1356   Vitals shown include unfiled device data.    Electronically Signed By: Anni Goldsmith MD  April 21, 2025  1:58 PM

## 2025-04-21 NOTE — OP NOTE
[facility]  Orthopedic Operative Note    Short Cephallomedullary Nailing    Abundio Beckett MRN# [id number]   YOB: 1935  Procedure Date:  4/21/2025  Age: 89 year old     PREOPERATIVE DIAGNOSIS:  right intertrochanteric femur fracture.    POSTOPERATIVE DIAGNOSIS:  right intertrochanteric femur fracture .     PROCEDURE PERFORMED:  Surgical treatment of right intertrochanteric femur fracture with cephalomedullary device    SURGEON:  Richy Junior MD    FIRST ASSISTANT: Miguel Ángel Fierro PA-C, whose assistance was required for positioning, prep and drape, instrumentation, and closure    ANESTHESIA:  General + regional (at my request for post-operative pain control)    EBL: 125 mL    COMPLICATIONS: None     DISPOSITION: Post Anesthesia Care Unit    CONDITION: Stable     INDICATIONS:  Abundio Beckett is a 89 year old male presenting with a right intertrochanteric femur fracture.  Discussed both operative and nonoperative management. Risks of surgery discussed included but not limited to bleeding, infection, damage to surrounding neurovascular structures, leg length inequality, nonunion, malunion, need for revision surgery, blood clots, pulmonary embolus, and the medical risks of anesthesia.  Benefits of surgery discussed included improved chance of union in acceptable alignment, improve function, and reduction in medical risks of hip fractures.  Alternatives discussed included nonoperative management which I did not recommend.  The patient acknowledges risks and wishes to proceed. The informed consent was signed and documented.  I met with the patient preoperatively and marked the operative extremity.      IMPLANTS:  Implant Name Type Inv. Item Serial No.  Lot No. LRB No. Used Action   NAIL TROCHANTERIC GAMMA [id number] 125DEG [id number] - [id number] Metallic [employer]ware/[place] NAIL TROCHANTERIC GAMMA [id number] 125DEG [id number]  [employer] [id number] Right 1  Implanted   SCREW BONE LAG GAMMA [id number] [id number] - [id number] Metallic Hardware/[place] SCREW BONE LAG GAMMA [id number] [id number]  [employer] [id number] Right 1 Implanted   SCREW BN 37.5MM 5MM LCK STRL T2 ALPHA - [id number] Metallic Hardware/[place] SCREW BN 37.5MM 5MM LCK STRL T2 ALPHA  [facility] [id number] Right 1 Implanted       PROCEDURE: The patient was brought to the OR and underwent general anesthesia.  The patient was subsequently transferred in a supine position to the fracture table.  The peroneal post was placed.  The perineum was engaged gently into the perineal post.  Both feet were placed in well-padded traction boots.  Patient placed in a scissored position with the fractured side and in-line traction and the well leg placed in approximately 30 degrees of hip extension.  Fluoroscopic unit was brought into the field and provisional reduction was achieved.  The right hip was prepped and draped in normal sterile fashion.  Antibiotic administration was confirmed and timeout was completed.  Following generalized agreement, a lateral hip incision was localized on fluoroscopy.  I sharply incised the skin, and incised the fascia.  A sanders elevator was advanced over the anterior femoral neck and used to help reduce the fracture.  A lateral buttock incision was made, then the starting guidepin was advanced down to the start point on the greater trochanter.  We confirmed the start point on fluoroscopy and then advanced the guidewire into the femur.  The position of the guidewire was confirmed with fluoroscopy.  We gained access to the intramedullary nail using the entry reamer.  All instruments were removed.  A Avalon 125 degree x 200 cm x 11 mm nail was selected.  This was advanced to an appropriate depth, confirmed by fluoroscopy.  We then advanced the guide for the lag screw down to bone through a small lateral thigh incision.   The guidewire was then advanced into the central position  within the femoral neck and head confirmed by fluoroscopy.  We then measured for and selected an appropriate length lag screw.  We placed an anti-rotation pin, then we reamed for the lag screw and then placed a lag screw by hand. The setscrew was advanced and tightened.  The guide for the interlocking screw was advanced through the jig down to bone through a small lateral thigh incision.  We then drilled for and placed one statically interlocked distal interlocking screw.  Good bicortical purchase was achieved.  All instruments were then removed.  Final fluoroscopic imaging was obtained demonstrating good alignment of the fracture good positioning of all hardware.  We then thoroughly irrigated and closed in layers with 2-0 Vicryl, and staples.  The wounds were anesthetized with bupivacaine and sterile dressings were applied.  Patient was transported to the recovery room in stable condition, sustaining no complications. There were no complications and the patient tolerated well.    PLAN:    Activity: Weight-bear as tolerated, range of motion as tolerated, physical therapy and occupational therapy consults  Antibiotics: 24 hours IV antibiotics per standard postop protocol  DVT:  SCD's and chemical prophylaxis with Lovenox in the hospital.  Would recommend Lovenox 40 mg subcutaneous daily or prophylactic dose DOAC until discharge from TCU (or 6 weeks, whichever comes first), then transition to ASA 81 mg PO daily  Discharge:  Case management social work consult for placement  Follow-up:  Follow up at [facility] in 2 weeks with repeat x-rays AP and lateral right hip and femur    Richy Junior MD  [facility]

## 2025-04-21 NOTE — PROGRESS NOTES
Tried to visit the patient multiple times during the day, patient remained in the OR and PACU until later in the day, labs reviewed, will try to see if patient is back to the floors before the end of the day.

## 2025-04-21 NOTE — ED PROVIDER NOTES
"  Emergency Department Note      History of Present Illness     Chief Complaint   Fall and Hip Pain      HPI   Abundio Beckett is an 89 year old male with a history of hypertension presenting with his daughter and son-in-law after a fall with right hip pain.  He was returning home after celebrating Easter with his family when he stumbled on carpet and fell onto his right hip.  He did hit his head \"but not hard\" and right shoulder.  He denies loss of consciousness, headache, vision changes, nausea, vomiting, neck pain, or shoulder pain.  His complaint today is right hip pain.    Independent Historian   None    Review of External Notes   I reviewed 3/12/25 primary care note for hypertension  I also reviewed 04/18/25 Orthopedics note for knee ostearthritis and where the patient got a steroid injection in both knees     Past Medical History     Medical History and Problem List   Allergies  Hypertension  Prostate cancer   Hyperglycemia  Hypertrophy benign   Hypothyroidism  Knee ostearthritis    Medications   Norvasc  Aspirin 325 mg   Levothyroxine    Surgical History   HC shoulder arthroscopy  ZZC remv prostate perineal radical     Physical Exam     Patient Vitals for the past 24 hrs:   BP Temp Temp src Pulse Resp SpO2   04/20/25 2335 (!) 151/81 97.7  F (36.5  C) Oral 70 16 95 %   04/20/25 2200 (!) 165/86 -- -- 72 -- 94 %   04/20/25 2034 (!) 152/95 99.1  F (37.3  C) Oral 73 16 93 %     Physical Exam  General: Well-developed and well-nourished. Well appearing elderly  man. Cooperative.  Head:  Atraumatic.  Eyes:  Conjunctivae, lids, and sclerae are normal.  ENT:    Normal nose. Moist mucous membranes.  Neck:  Supple. Normal range of motion.  No midline tenderness.  CV:  Regular rate and rhythm. Normal heart sounds with no murmurs, rubs, or gallops detected.  Resp:  No respiratory distress. Clear to auscultation bilaterally without decreased breath sounds, wheezing, rales, or rhonchi.  GI:  Soft. Non-distended. " Non-tender.    MS:  Decreased range of motion of the right hip secondary to pain with extremity shortened and externally rotated as compared to the contralateral side.  Tenderness anterolaterally.  There is no bony tenderness throughout the left lower extremity or either upper extremity.  Skin:  Warm. Non-diaphoretic. No pallor.  Neuro:  Awake. A&Ox3. Normal strength.  Psych: Normal mood and affect. Normal speech.  Vitals reviewed.     Diagnostics     Lab Results   Labs Ordered and Resulted from Time of ED Arrival to Time of ED Departure   BASIC METABOLIC PANEL - Abnormal       Result Value    Sodium 138      Potassium 3.3 (*)     Chloride 101      Carbon Dioxide (CO2) 27      Anion Gap 10      Urea Nitrogen 16.5      Creatinine 0.87      GFR Estimate 82      Calcium 8.9      Glucose 135 (*)    CBC WITH PLATELETS AND DIFFERENTIAL - Abnormal    WBC Count 6.2      RBC Count 4.27 (*)     Hemoglobin 14.0      Hematocrit 41.0      MCV 96      MCH 32.8      MCHC 34.1      RDW 12.6      Platelet Count 146 (*)     % Neutrophils 61      % Lymphocytes 26      % Monocytes 11      % Eosinophils 1      % Basophils 1      % Immature Granulocytes 1      NRBCs per 100 WBC 0      Absolute Neutrophils 3.8      Absolute Lymphocytes 1.7      Absolute Monocytes 0.7      Absolute Eosinophils 0.1      Absolute Basophils 0.0      Absolute Immature Granulocytes 0.0      Absolute NRBCs 0.0         Imaging   CT Head w/o Contrast   Final Result   IMPRESSION:   1.  No acute intracranial process.      2.  Chronic intracranial changes described above.      XR Pelvis w Hip Right 1 View   Final Result   IMPRESSION: Acute displaced and angulated right proximal femoral fracture with intertrochanteric involvement. The fracture extends towards the base of the femoral neck. Bones are demineralized and there is mild right hip arthrosis. Surgical clips in the    pelvis.      NOTE: ABNORMAL REPORT      THE DICTATION ABOVE DESCRIBES AN ABNORMALITY FOR WHICH  FOLLOW-UP IS NEEDED.           EKG  Indication: Fall  Time: 2033  Rate 74 bpm. MS interval 192. QRS duration 92. QT/QTc 414/459.   Normal sinus rhythm  Left axis deviation  Abnormal ECG    No acute ST changes.  No changes as compared to prior, dated 10/19/22.     Independent Interpretation   X-ray shows right hip fracture    ED Course      Medications Administered   Medications   morphine (PF) injection 4 mg (4 mg Intravenous $Given 4/20/25 2120)   potassium chloride sandro ER (KLOR-CON M20) CR tablet 40 mEq (40 mEq Oral $Given 4/20/25 2156)   morphine (PF) injection 4 mg (4 mg Intravenous $Given 4/20/25 2214)     Discussion of Management   Admitting Hospitalist,     ED Course   ED Course as of 04/21/25 0027   Sun Apr 20, 2025 2109 I obtained history and examined the patient as noted above    2213 I spoke with Dr. Corbin, hospitalist, who accepts admission and has no further orders.       Additional Documentation  Supportive daughter and son-in-law  Medical Decision Making / Diagnosis   MIKAELA Del Castillo is an 89 year old man who had a mechanical stumble and fall onto his right hip just prior to arrival complaining of right hip pain.  He is well-appearing on exam outside of shortened and externally rotated right lower extremity.    Laboratory studies are significant for mild hypokalemia which was repleted with oral potassium.  EKG is unremarkable.  His pain was treated with morphine.  CT of the head does not reveal intracranial hemorrhage or skull fracture.  X-ray of the pelvis/hip confirms right intertrochanteric hip fracture.     I updated the patient and his family on findings and plan for admission for operative intervention.  I answered all their questions and they verbalized understanding.  I discussed the patient's case with Dr. Corbin, hospitalist, who accepts admission and has no further orders.    Disposition   The patient was admitted to the hospital.     Diagnosis     ICD-10-CM    1. Closed displaced  intertrochanteric fracture of right femur, initial encounter (H)  S72.141A       2. Hypokalemia  E87.6            Scribe Disclosure:  I, Lilliam Edward, am serving as a scribe at 9:01 PM on 4/20/2025 to document services personally performed by Maia De La O MD based on my observations and the provider's statements to me.        Maia De La O MD  04/21/25 [id number]

## 2025-04-21 NOTE — PLAN OF CARE
Diagnosis:Fall, right fx, cephalomedullary nailing   POD#:0  Mental Status:AxOx4  Activity/dangle: Not oob yet   Diet:Regular   Pain:IV dilaudid x1, oxycodone x1  Beltran/Voiding:External cath, incontinent   02/LDA:Continuous fluids   D/C Date:TBD

## 2025-04-21 NOTE — PLAN OF CARE
PRIMARY Concern: Femur Fracture  SAFETY RISK Concerns (fall risk, behaviors, etc.): Fall      Aggression Tool Color: Green  Isolation/Type: None  Tests/Procedures for NEXT shift: None  Consults? (Pending/following, signed-off?) Ortho  Where is patient from? (Home, TCU, etc.):   Other Important info for NEXT shift:   Anticipated DC date & active delays: TBD  _____________________________________________________________________________  SUMMARY NOTE:   Orientation/Cognitive: A&OX4  Observation Goals (Met/ Not Met): Inpatient  Mobility Level/Assist Equipment: Bed rest  Antibiotics & Plan (IV/po, length of tx left): None  Pain Management: PRN Avilable  Complete Pain Reassessment: Y Due next: Next Shift   Tele/VS/O2: VSS/RA  ABNL Lab/BG: See Chart  Diet: NPO  Bowel/Bladder: Continent  Skin Concerns: Scattered Bruises   Drains/Devices: PIV/SL  Patient Stated Goal for Today:

## 2025-04-21 NOTE — ANESTHESIA CARE TRANSFER NOTE
Patient: Abundio Beckett    Procedure: Procedure(s):  Right hip/femur cephalomedullary nail       Diagnosis: Closed displaced intertrochanteric fracture of right femur, initial encounter (H) [S72.006A]  Diagnosis Additional Information: No value filed.    Anesthesia Type:   General     Note:    Oropharynx: oropharynx clear of all foreign objects and spontaneously breathing  Level of Consciousness: drowsy  Oxygen Supplementation: face mask  Level of Supplemental Oxygen (L/min / FiO2): 6  Independent Airway: airway patency satisfactory and stable  Dentition: dentition unchanged  Vital Signs Stable: post-procedure vital signs reviewed and stable  Report to RN Given: handoff report given  Patient transferred to: PACU    Handoff Report: Identifed the Patient, Identified the Reponsible Provider, Reviewed the pertinent medical history, Discussed the surgical course, Reviewed Intra-OP anesthesia mangement and issues during anesthesia, Set expectations for post-procedure period and Allowed opportunity for questions and acknowledgement of understanding    Vitals:  Vitals Value Taken Time   /78 04/21/25 1234   Temp     Pulse 59 04/21/25 1234   Resp 30 04/21/25 1236   SpO2 96 % 04/21/25 1236   Vitals shown include unfiled device data.    Electronically Signed By: CONSTANTINE Mtz CRNA  April 21, 2025  12:37 PM

## 2025-04-21 NOTE — PLAN OF CARE
PRIMARY Concern:  Fall and Hip Pain, right femur fracture   Tests/Procedures for NEXT shift: surgery   Consults? (Pending/following, signed-off?): ortho following   Safety Risk CONCERNS (fall risk, behaviors, etc.): fall risk      Where is patient from? (Home, TCU, etc.): group home  Isolation/Type: none   Other Important info for next shift:   Anticipated DC date & active delays: pending   SUMMARY NOTE:  Orientation/Cognitive: A/Ox4  Observation Goals (Met/ Not Met): inpt   Mobility Level/Assist Equipment: strict bedrest   Antibiotics & Plan (IV/po, length of tx left): none   Pain Management: PRN tylenol and Oxycodone given for hip pain   Tele/VS/O2: VSS on room air   ABNL Lab/BG: K+ 4.3  Diet: NPO since MN  Bowel/Bladder: cont of B/B  Skin Concerns: scattered bruises   Drains/Devices: IV SL  Patient Stated Goal for Today: pain control     Pt transferred to Pre Op, plan to transfer to ortho floor post Op

## 2025-04-21 NOTE — PHARMACY-ADMISSION MEDICATION HISTORY
Pharmacist Admission Medication History    Admission medication history is complete. The information provided in this note is only as accurate as the sources available at the time of the update.    Information Source(s): Patient and CareEverywhere/SureScripts via in-person    Pertinent Information: He typically takes furosemide about every other day    Changes made to PTA medication list:  Added: latanoprost  Deleted: None  Changed: None    Allergies reviewed with patient and updates made in EHR: yes    Medication History Completed By: Macie Oneal [facility] 4/20/2025 10:48 PM    PTA Med List   Medication Sig Last Dose/Taking    amLODIPine (NORVASC) 5 MG tablet TAKE 1 TABLET BY MOUTH DAILY 4/20/2025 Morning    aspirin (ASA) 325 MG EC tablet Take 1 tablet (325 mg) by mouth daily 4/20/2025 Morning    CENTRUM SILVER OR TABS 1 TABLET DAILY 4/20/2025 Morning    furosemide (LASIX) 20 MG tablet TAKE 1 TABLET BY MOUTH DAILY AS  NEEDED FOR EDEMA 4/20/2025 Morning    latanoprost (XALATAN) 0.005 % ophthalmic solution Place 1 drop into both eyes every evening. 4/19/2025 Evening    levothyroxine (SYNTHROID/LEVOTHROID) 137 MCG tablet Take 1 tablet (137 mcg) by mouth daily. 4/20/2025 Morning    timolol maleate (TIMOPTIC) 0.5 % ophthalmic solution Place 1 drop into both eyes 2 times daily 4/20/2025 Morning

## 2025-04-21 NOTE — H&P
INTERNAL MEDICINE HISTORY AND PHYSICAL  [facility]      Abundio Beckett [MR#: [id number]  : 1935  89 year old male]  Date of Admission:  2025  Primary Care Provider:  Colton Chris      Chief Complaint:   Fall    History of Present Illness:   Abundio Beckett is a 89 year old male with history including HTN; hypothyroidism; h/o CVA; who presents with fall.     At his co-op apartment today, patient suffered a mechanical fall. Simply stumbled on the carpet while walking in the hallway. Fell onto his right hip. Subsequently had significant right hip pain. Brought to [facility] for further evaluation.    On initial evaluation, pt was afebrile, hypertensive. Labs notable for CBC with WBC normal, hgb normal, platelets 146K; BMP with potassium 3.3.     Pelvis x-rays showed acute displaced and angulated right proximal femoral fracture with intertrochanteric involvement; noted that the fracture extended towards the base of the femoral neck.    Head CT w/o contrast showed no acute intracranial process; chronic intracranial changes.    Denies chest pain or dyspnea. No fevers or chills. No abdominal pain, bloody stools or N/V. Notes chronic issues with constipation. Notes he had both knees injected by Orthopedic surgery 2025.      Past Medical History:     Hypertension (benign essential).  Hypothyroidism.  HLD. Not on meds.  Osteoarthritis.  Glaucoma.  H/o CVA. Noted h/o lacunar infarct noted in .  H/o prostate cancer. S/p prostate surgery in .  H/o basal cell carcinoma.  H/o esophageal dysphagia.        Past Medical History:   Diagnosis Date    Basal cell carcinoma     BENIGN PROSTATIC HYPERTROPHY 2002    Essential hypertension, benign 2016    Hyperlipidemia LDL goal <100 2012    Hypertrophy (benign) of prostate     abstracted 02.    HYPOTHYROIDISM NOS 2002    Lacunar infarction (H) 2012    Unspecified hypothyroidism     abstracted  02.     Above past medical history reviewed and edited and/or added to as necessary.    Past Surgical History:       Past Surgical History:   Procedure Laterality Date    HC REPAIR OF NASAL SEPTUM      deviated septum surgery    HC SHOULDER ARTHROSCOPY, DX      ZZC REMV PROSTATE,PERINEAL,RADICAL  2000     Above past medical surgical history reviewed and edited and/or added to as necessary.     Allergies:     Allergies   Allergen Reactions    Pravachol [Pravastatin] Muscle Pain (Myalgia)    Simvastatin         Medications:     Prior to Admission Medications   Prescriptions Last Dose Informant Patient Reported? Taking?   CENTRUM SILVER OR TABS   Yes No   Si TABLET DAILY   amLODIPine (NORVASC) 5 MG tablet   No No   Sig: TAKE 1 TABLET BY MOUTH DAILY   aspirin (ASA) 325 MG EC tablet   No No   Sig: Take 1 tablet (325 mg) by mouth daily   furosemide (LASIX) 20 MG tablet   No No   Sig: TAKE 1 TABLET BY MOUTH DAILY AS  NEEDED FOR EDEMA   ketoconazole (NIZORAL) 2 % external cream   No No   Sig: Apply topically daily.   levothyroxine (SYNTHROID/LEVOTHROID) 137 MCG tablet   No No   Sig: Take 1 tablet (137 mcg) by mouth daily.   timolol maleate (TIMOPTIC) 0.5 % ophthalmic solution   Yes No   Sig: Place 1 drop into both eyes 2 times daily      Facility-Administered Medications: None       Social History:     Social History     Socioeconomic History    Marital status:      Spouse name: Not on file    Number of children: 2    Years of education: Not on file    Highest education level: Not on file   Occupational History    Occupation:  - retired   Tobacco Use    Smoking status: Never    Smokeless tobacco: Never   Vaping Use    Vaping status: Never Used   Substance and Sexual Activity    Alcohol use: Yes     Comment: Occasional wine or Margarita.    Drug use: No    Sexual activity: Not Currently     Partners: Female   Other Topics Concern    Parent/sibling w/ CABG, MI or angioplasty before 65F 55M? Not Asked    Social History Narrative    Lives in a co-op apartment. Attends exercise classes 3 times a week at his facility (including balance work). Uses a cane when out and about.     Social Drivers of Health     Financial Resource Strain: Low Risk  (3/20/2024)    Financial Resource Strain     Within the past 12 months, have you or your family members you live with been unable to get utilities (heat, electricity) when it was really needed?: No   Food Insecurity: Low Risk  (3/20/2024)    Food Insecurity     Within the past 12 months, did you worry that your food would run out before you got money to buy more?: Patient declined     Within the past 12 months, did the food you bought just not last and you didn t have money to get more?: No   Transportation Needs: Low Risk  (3/20/2024)    Transportation Needs     Within the past 12 months, has lack of transportation kept you from medical appointments, getting your medicines, non-medical meetings or appointments, work, or from getting things that you need?: No   Physical Activity: Unknown (3/20/2024)    Exercise Vital Sign     Days of Exercise per Week: 3 days     Minutes of Exercise per Session: Not on file   Stress: Stress Concern Present (3/20/2024)    [employer] [employer] - Occupational Stress Questionnaire     Feeling of Stress : To some extent   Social Connections: Unknown (3/20/2024)    Social Connection and Isolation Panel [NHANES]     Frequency of Communication with Friends and Family: Not on file     Frequency of Social Gatherings with Friends and Family: More than three times a week     Attends Jew Services: Not on file     Active Member of Clubs or Organizations: Not on file     Attends Club or Organization Meetings: Not on file     Marital Status: Not on file   Interpersonal Safety: Not on file   Housing Stability: Low Risk  (3/20/2024)    Housing Stability     Do you have housing? : Yes     Are you worried about losing your housing?: No        Family History:   Reviewed.  No family history on file.    Review of Systems:   As noted in the HPI; otherwise 10 point review of systems was negative.     Physical Exam:   VITALS:  Blood pressure (!) 152/95, pulse 73, temperature 99.1  F (37.3  C), temperature source Oral, resp. rate 16, SpO2 93%.  Constitutional: awake, alert, oriented, pleasant   Head: normocephalic, atraumatic  Eyes: PERRL; no scleral icterus or conjunctival injection  ENT: oropharynx without erythema or exudate  Neck: no bruits, JVD or adenopathy  Cardiovascular: regular rate and rhythm; no murmurs, rubs or gallops  Lungs: diminished in the bases, no crackles or wheezes  Gastrointestinal/Abdomen: soft, non-tender, non-distended, positive bowel sounds, no femoral bruits  :   Musculoskeletal:   Skin/Extremities:  bilateral lower extremities with trace to 1+ edema, right lower extremity appears foreshortened; did not manipulate right lower extremity due to known fracture  Neurologic:   Psychiatric:   Hematologic/Lymphatic/Immunologic:         Labs, Imaging & Other Data:     Results for orders placed or performed during the hospital encounter of 04/20/25   CT Head w/o Contrast     Status: None    Narrative    EXAM: CT HEAD W/O CONTRAST  LOCATION: [facility]  DATE: 4/20/2025    INDICATION: fall with head injury  COMPARISON: MRI brain 6/6/2016, CT head 9/10/2016  TECHNIQUE: Routine CT Head without IV contrast. Multiplanar reformats. Dose reduction techniques were used.    FINDINGS:  INTRACRANIAL CONTENTS: No intracranial hemorrhage, extraaxial collection, or mass effect.  No CT evidence of acute infarct. Moderate to severe presumed chronic small vessel ischemic changes. Moderate generalized volume loss. No hydrocephalus. Left   thalamus small chronic lacunar infarct redemonstrated.    VISUALIZED ORBITS/SINUSES/MASTOIDS: No intraorbital abnormality. Mild mucosal thickening scattered about the paranasal sinuses. Right  maxillary sinus small retention cyst/mucosal polyp. No middle ear or mastoid effusion.    BONES/SOFT TISSUES: No acute abnormality.      Impression    IMPRESSION:  1.  No acute intracranial process.    2.  Chronic intracranial changes described above.   XR Pelvis w Hip Right 1 View     Status: None    Narrative    EXAM: XR PELVIS AND HIP RIGHT 1 VIEW  LOCATION: [facility]  DATE: 4/20/2025    INDICATION: Fall, hip pain and likely fracture.  COMPARISON: None.      Impression    IMPRESSION: Acute displaced and angulated right proximal femoral fracture with intertrochanteric involvement. The fracture extends towards the base of the femoral neck. Bones are demineralized and there is mild right hip arthrosis. Surgical clips in the   pelvis.    NOTE: ABNORMAL REPORT    THE DICTATION ABOVE DESCRIBES AN ABNORMALITY FOR WHICH FOLLOW-UP IS NEEDED.    [place] Draw     Status: None    Narrative    The following orders were created for panel order [place] Draw.  Procedure                               Abnormality         Status                     ---------                               -----------         ------                     Extra Green Top (Lithiu...[[id number]]                      Final result               Extra Purple Top Tube[[id number]]                           Final result                 Please view results for these tests on the individual orders.   Extra Green Top (Lithium Heparin) Tube     Status: None   Result Value Ref Range    Hold Specimen JIC    Extra Purple Top Tube     Status: None   Result Value Ref Range    Hold Specimen JIC    Basic metabolic panel     Status: Abnormal   Result Value Ref Range    Sodium 138 135 - 145 mmol/L    Potassium 3.3 (L) 3.4 - 5.3 mmol/L    Chloride 101 98 - 107 mmol/L    Carbon Dioxide (CO2) 27 22 - 29 mmol/L    Anion Gap 10 7 - 15 mmol/L    Urea Nitrogen 16.5 8.0 - 23.0 mg/dL    Creatinine 0.87 0.67 - 1.17 mg/dL    GFR Estimate 82 >60 mL/min/1.73m2     Calcium 8.9 8.8 - 10.4 mg/dL    Glucose 135 (H) 70 - 99 mg/dL   CBC with platelets and differential     Status: Abnormal   Result Value Ref Range    WBC Count 6.2 4.0 - 11.0 10e3/uL    RBC Count 4.27 (L) 4.40 - 5.90 10e6/uL    Hemoglobin 14.0 13.3 - 17.7 g/dL    Hematocrit 41.0 40.0 - 53.0 %    MCV 96 78 - 100 fL    MCH 32.8 26.5 - 33.0 pg    MCHC 34.1 31.5 - 36.5 g/dL    RDW 12.6 10.0 - 15.0 %    Platelet Count 146 (L) 150 - 450 10e3/uL    % Neutrophils 61 %    % Lymphocytes 26 %    % Monocytes 11 %    % Eosinophils 1 %    % Basophils 1 %    % Immature Granulocytes 1 %    NRBCs per 100 WBC 0 <1 /100    Absolute Neutrophils 3.8 1.6 - 8.3 10e3/uL    Absolute Lymphocytes 1.7 0.8 - 5.3 10e3/uL    Absolute Monocytes 0.7 0.0 - 1.3 10e3/uL    Absolute Eosinophils 0.1 0.0 - 0.7 10e3/uL    Absolute Basophils 0.0 0.0 - 0.2 10e3/uL    Absolute Immature Granulocytes 0.0 <=0.4 10e3/uL    Absolute NRBCs 0.0 10e3/uL   CBC with platelets differential     Status: Abnormal    Narrative    The following orders were created for panel order CBC with platelets differential.  Procedure                               Abnormality         Status                     ---------                               -----------         ------                     CBC with platelets and ...[[id number]]  Abnormal            Final result               RBC and Platelet Morpho...[[id number]]                                                   Please view results for these tests on the individual orders.           ASSESSMENT & PLAN:   Assessment & Plan    Abundio Beckett is a 89 year old male with history including HTN; hypothyroidism; h/o CVA; who presents with fall.     From HPI:  At his co-op apartment today, patient suffered a mechanical fall. Simply stumbled on the carpet while walking in the hallway. Fell onto his right hip. Subsequently had significant right hip pain. Brought to [facility] for further evaluation.    On initial evaluation, pt was  afebrile, hypertensive. Labs notable for CBC with WBC normal, hgb normal, platelets 146K; BMP with potassium 3.3.     Pelvis x-rays showed acute displaced and angulated right proximal femoral fracture with intertrochanteric involvement; noted that the fracture extended towards the base of the femoral neck.    Head CT w/o contrast showed no acute intracranial process; chronic intracranial changes.      Acute displaced and angulated right proximal femoral fracture with intertrochanteric involvement due to mechanical fall.  * Initial presentation as above.  - Strict bedrest.  - Orthopedic surgery consult.  - Order PRN acetaminophen, PRN oxycodone, PRN IV hydromorphone; minimize opioids as able.  - NPO after midnight.    Thrombocytopenia, unclear etiology, possibly medication effect or acute/chronic from other cause.  * Initial presentation as above. Platelets 146K on admit. Pt with h/o thrombocytopenia in chart review, was 115K- 129K range in 2024 and was also 122K  in 2022. On admit, in speaking with pt and his daughter, apparently this may be a chronic issue, though not previously documented; has never seen a Hematologist; daughter notes she has TCP as well.  - Continue to monitor CBC.  - Consider platelet transfusion if needs a procedure and less than 50,000; or if less than 10,000.    Hypokalemia.  * Initial presentation as above. Potassium 3.3 on admit.  - Order potassium replacement.    Hypertension (benign essential).  - Continue PTA amlodipine with hold parameters.  - Hold PTA furosemide for now to reduce risk for hypovolemia and electrolyte disturbances in the above setting.    Hypothyroidism.  - Continue levothyroxine.    Glaucoma.  - Continue PTA eye drops.    H/o CVA.   * Noted h/o lacunar infarct noted in 2012.  - Noted.    Other chronic medical issues:  HLD. Not on meds.  H/o prostate cancer. S/p prostate surgery in 2000.  - Noted.    Clinically Significant Risk Factors Present on Admission        #  Hypokalemia: Lowest K = 3.3 mmol/L in last 2 days, will replace as needed          # Drug Induced Platelet Defect: home medication list includes an antiplatelet medication   # Hypertension: Noted on problem list                      Prophylaxis.  - PCD's      CODE STATUS: FULL      Sagar Corbin Jr., MD  [phone] (p)  Text Page  Vocera

## 2025-04-21 NOTE — PROGRESS NOTES
Admission/Transfer from: ed  2 RN skin assessment completed. Yes, Juvenal Vicente RN  Significant findings include: Scattered Bruises  WOC Nurse Consult Ordered? No

## 2025-04-21 NOTE — ANESTHESIA PREPROCEDURE EVALUATION
Anesthesia Pre-Procedure Evaluation    Patient: Abundio Beckett   MRN: [id number] : 1935        Procedure : Procedure(s):  Right hip/femur cephalomedullary nail          Past Medical History:   Diagnosis Date    Basal cell carcinoma     BENIGN PROSTATIC HYPERTROPHY 2002    Essential hypertension, benign 2016    Hyperlipidemia LDL goal <100 2012    Hypertrophy (benign) of prostate     abstracted 02.    HYPOTHYROIDISM NOS 2002    Lacunar infarction (H) 2012    Unspecified hypothyroidism     abstracted 02.      Past Surgical History:   Procedure Laterality Date    HC REPAIR OF NASAL SEPTUM      deviated septum surgery    HC SHOULDER ARTHROSCOPY, DX      ZZC REMV PROSTATE,PERINEAL,RADICAL  2000      Allergies   Allergen Reactions    Pravachol [Pravastatin] Muscle Pain (Myalgia)    Simvastatin       Social History     Tobacco Use    Smoking status: Never    Smokeless tobacco: Never   Substance Use Topics    Alcohol use: Yes     Comment: Occasional wine or Margarita.      Wt Readings from Last 1 Encounters:   25 98 kg (216 lb 0.8 oz)        Anesthesia Evaluation   Pt has had prior anesthetic. Type: General.    No history of anesthetic complications       ROS/MED HX  ENT/Pulmonary:    (-) sleep apnea   Neurologic:     (+)              TIA (),                  Cardiovascular:     (+) Dyslipidemia hypertension- -   -  - -                                      METS/Exercise Tolerance: 4 - Raking leaves, gardening    Hematologic:       Musculoskeletal: Comment: Recent fall and hip fx  (+)  arthritis,   fracture, Fracture location: RLE,         GI/Hepatic:     (+) GERD, Asymptomatic on medication, esophageal disease,                 Renal/Genitourinary:       Endo:     (+)          thyroid problem,            Psychiatric/Substance Use:       Infectious Disease:       Malignancy:       Other:            Physical Exam    Airway        Mallampati: IV   TM distance: > 3 FB  "  Neck ROM: full   Mouth opening: > 3 cm    Respiratory Devices and Support         Dental       (+) Minor Abnormalities - some fillings, tiny chips      Cardiovascular   cardiovascular exam normal          Pulmonary   pulmonary exam normal                OUTSIDE LABS:  CBC:   Lab Results   Component Value Date    WBC 9.4 04/21/2025    WBC 6.2 04/20/2025    HGB 13.9 04/21/2025    HGB 14.0 04/20/2025    HCT 41.0 04/21/2025    HCT 41.0 04/20/2025     (L) 04/21/2025     (L) 04/20/2025     BMP:   Lab Results   Component Value Date     04/21/2025     04/20/2025    POTASSIUM 4.3 04/21/2025    POTASSIUM 3.3 (L) 04/20/2025    CHLORIDE 102 04/21/2025    CHLORIDE 101 04/20/2025    CO2 25 04/21/2025    CO2 27 04/20/2025    BUN 13.0 04/21/2025    BUN 16.5 04/20/2025    CR 0.73 04/21/2025    CR 0.87 04/20/2025     (H) 04/21/2025     (H) 04/20/2025     COAGS: No results found for: \"PTT\", \"INR\", \"FIBR\"  POC: No results found for: \"BGM\", \"HCG\", \"HCGS\"  HEPATIC:   Lab Results   Component Value Date    ALBUMIN 4.2 12/19/2024    PROTTOTAL 6.7 12/19/2024    ALT 13 12/19/2024    AST 18 12/19/2024    ALKPHOS 71 12/19/2024    BILITOTAL 0.6 12/19/2024     OTHER:   Lab Results   Component Value Date    A1C 5.5 06/20/2024    GHISLAINE 9.0 04/21/2025    TSH 5.73 (H) 03/13/2025    T4 1.21 03/13/2025    SED 5 05/22/2012       Anesthesia Plan    ASA Status:  3    NPO Status:  NPO Appropriate    Anesthesia Type: General.     - Airway: LMA              Consents    Anesthesia Plan(s) and associated risks, benefits, and realistic alternatives discussed. Questions answered and patient/representative(s) expressed understanding.     - Discussed:     - Discussed with:  Patient            Postoperative Care    Pain management: Peripheral nerve block (Single Shot), Multi-modal analgesia.   PONV prophylaxis: Ondansetron (or other 5HT-3), Dexamethasone or Solumedrol, Background Propofol Infusion     Comments:               " Anni Goldsmith MD    Clinically Significant Risk Factors Present on Admission        # Hypokalemia: Lowest K = 3.3 mmol/L in last 2 days, will replace as needed          # Drug Induced Platelet Defect: home medication list includes an antiplatelet medication   # Hypertension: Noted on problem list

## 2025-04-21 NOTE — ED TRIAGE NOTES
Pt arrived via EMS from assisted living [facility]. Pt stumbled and fell while ambulating in a hallway. States he only occasionally needs to use a cane and today's fall was simply mechanical, denies dizziness, weakness, or other contributing factor. Fell onto R hip with severe pain. Unable to straighten leg on arrival. Was placed in c-collar by EMS out of precaution, denies head or neck pain. GCS 15.     Triage Assessment (Adult)       Row Name 04/20/25 2035          Triage Assessment    Airway WDL WDL        Respiratory WDL    Respiratory WDL WDL        Skin Circulation/Temperature WDL    Skin Circulation/Temperature WDL WDL        Cardiac WDL    Cardiac WDL WDL        Peripheral/Neurovascular WDL    Peripheral Neurovascular WDL WDL        Cognitive/Neuro/Behavioral WDL    Cognitive/Neuro/Behavioral WDL WDL

## 2025-04-21 NOTE — PROGRESS NOTES
Pt is A/Ox4, VSS on room air, family at bedside. Report given to PreOp RN, pt send down to PreOp area in bed. Pt is strict bedrest.

## 2025-04-22 ENCOUNTER — APPOINTMENT (OUTPATIENT)
Dept: PHYSICAL THERAPY | Facility: CLINIC | Age: OVER 89
DRG: 481 | End: 2025-04-22
Payer: COMMERCIAL

## 2025-04-22 LAB
ERYTHROCYTE [DISTWIDTH] IN BLOOD BY AUTOMATED COUNT: 12.7 % (ref 10–15)
GLUCOSE SERPL-MCNC: 134 MG/DL (ref 70–99)
HCT VFR BLD AUTO: 33.3 % (ref 40–53)
HGB BLD-MCNC: 11.2 G/DL (ref 13.3–17.7)
MCH RBC QN AUTO: 33 PG (ref 26.5–33)
MCHC RBC AUTO-ENTMCNC: 33.6 G/DL (ref 31.5–36.5)
MCV RBC AUTO: 98 FL (ref 78–100)
PLATELET # BLD AUTO: 124 10E3/UL (ref 150–450)
POTASSIUM SERPL-SCNC: 4.4 MMOL/L (ref 3.4–5.3)
RBC # BLD AUTO: 3.39 10E6/UL (ref 4.4–5.9)
WBC # BLD AUTO: 9.1 10E3/UL (ref 4–11)

## 2025-04-22 PROCEDURE — 85014 HEMATOCRIT: CPT | Performed by: INTERNAL MEDICINE

## 2025-04-22 PROCEDURE — 97161 PT EVAL LOW COMPLEX 20 MIN: CPT | Mod: GP

## 2025-04-22 PROCEDURE — 120N000001 HC R&B MED SURG/OB

## 2025-04-22 PROCEDURE — 97530 THERAPEUTIC ACTIVITIES: CPT | Mod: GP

## 2025-04-22 PROCEDURE — 36415 COLL VENOUS BLD VENIPUNCTURE: CPT | Performed by: INTERNAL MEDICINE

## 2025-04-22 PROCEDURE — 84132 ASSAY OF SERUM POTASSIUM: CPT | Performed by: INTERNAL MEDICINE

## 2025-04-22 PROCEDURE — 82947 ASSAY GLUCOSE BLOOD QUANT: CPT | Performed by: INTERNAL MEDICINE

## 2025-04-22 PROCEDURE — 250N000011 HC RX IP 250 OP 636

## 2025-04-22 PROCEDURE — 250N000013 HC RX MED GY IP 250 OP 250 PS 637: Performed by: INTERNAL MEDICINE

## 2025-04-22 PROCEDURE — 99232 SBSQ HOSP IP/OBS MODERATE 35: CPT | Performed by: INTERNAL MEDICINE

## 2025-04-22 PROCEDURE — 250N000013 HC RX MED GY IP 250 OP 250 PS 637

## 2025-04-22 RX ADMIN — CEFAZOLIN SODIUM 2 G: 2 SOLUTION INTRAVENOUS at 02:32

## 2025-04-22 RX ADMIN — ACETAMINOPHEN 975 MG: 325 TABLET, FILM COATED ORAL at 08:37

## 2025-04-22 RX ADMIN — LEVOTHYROXINE SODIUM 137 MCG: 137 TABLET ORAL at 08:38

## 2025-04-22 RX ADMIN — SENNOSIDES AND DOCUSATE SODIUM 1 TABLET: 50; 8.6 TABLET ORAL at 20:06

## 2025-04-22 RX ADMIN — SENNOSIDES AND DOCUSATE SODIUM 1 TABLET: 50; 8.6 TABLET ORAL at 08:39

## 2025-04-22 RX ADMIN — ACETAMINOPHEN 975 MG: 325 TABLET, FILM COATED ORAL at 00:24

## 2025-04-22 RX ADMIN — ENOXAPARIN SODIUM 40 MG: 40 INJECTION SUBCUTANEOUS at 09:43

## 2025-04-22 RX ADMIN — POLYETHYLENE GLYCOL 3350 17 G: 17 POWDER, FOR SOLUTION ORAL at 08:39

## 2025-04-22 RX ADMIN — LATANOPROST 1 DROP: 50 SOLUTION OPHTHALMIC at 20:07

## 2025-04-22 RX ADMIN — AMLODIPINE BESYLATE 5 MG: 5 TABLET ORAL at 13:43

## 2025-04-22 RX ADMIN — OXYCODONE HYDROCHLORIDE 5 MG: 5 TABLET ORAL at 13:43

## 2025-04-22 RX ADMIN — TIMOLOL MALEATE 1 DROP: 5 SOLUTION OPHTHALMIC at 20:07

## 2025-04-22 RX ADMIN — ACETAMINOPHEN 975 MG: 325 TABLET, FILM COATED ORAL at 16:57

## 2025-04-22 RX ADMIN — OXYCODONE HYDROCHLORIDE 5 MG: 5 TABLET ORAL at 09:34

## 2025-04-22 RX ADMIN — TIMOLOL MALEATE 1 DROP: 5 SOLUTION OPHTHALMIC at 08:39

## 2025-04-22 ASSESSMENT — ACTIVITIES OF DAILY LIVING (ADL)
ADLS_ACUITY_SCORE: 71
ADLS_ACUITY_SCORE: 59
ADLS_ACUITY_SCORE: 59
ADLS_ACUITY_SCORE: 72
ADLS_ACUITY_SCORE: 57
ADLS_ACUITY_SCORE: 71
ADLS_ACUITY_SCORE: 71
ADLS_ACUITY_SCORE: 59
ADLS_ACUITY_SCORE: 71
ADLS_ACUITY_SCORE: 76
ADLS_ACUITY_SCORE: 57
ADLS_ACUITY_SCORE: 59
ADLS_ACUITY_SCORE: 72
ADLS_ACUITY_SCORE: 71
ADLS_ACUITY_SCORE: 57
ADLS_ACUITY_SCORE: 59
ADLS_ACUITY_SCORE: 72
ADLS_ACUITY_SCORE: 57
ADLS_ACUITY_SCORE: 59
ADLS_ACUITY_SCORE: 57
ADLS_ACUITY_SCORE: 71

## 2025-04-22 NOTE — CONSULTS
Care Management Initial Consult    General Information  Assessment completed with: Patient, Family, Spouse or significant other,    Type of CM/SW Visit: Initial Assessment    Primary Care Provider verified and updated as needed: Yes   Readmission within the last 30 days:        Reason for Consult: discharge planning  Advance Care Planning: Advance Care Planning Reviewed: present on chart          Communication Assessment  Patient's communication style: spoken language (English or Bilingual)             Cognitive  Cognitive/Neuro/Behavioral: .WDL except, orientation  Level of Consciousness: intermittent confusion  Arousal Level: opens eyes spontaneously  Orientation: disoriented to, place  Mood/Behavior: calm, cooperative  Best Language: 0 - No aphasia  Speech: clear    Living Environment:   People in home: alone     Current living Arrangements: apartment      Able to return to prior arrangements: yes       Family/Social Support:  Care provided by: self  Provides care for: no one  Marital Status:   Support system: Significant Other, Children       Kori  Description of Support System: Supportive, Involved    Support Assessment: Adequate family and caregiver support, Adequate social supports    Current Resources:   Patient receiving home care services: No        Community Resources: None  Equipment currently used at home: cane, straight  Supplies currently used at home:      Employment/Financial:  Employment Status: retired        Financial Concerns: none           Does the patient's insurance plan have a 3 day qualifying hospital stay waiver?  Yes     Which insurance plan 3 day waiver is available? Alternative insurance waiver    Will the waiver be used for post-acute placement? Yes    Lifestyle & Psychosocial Needs:  Social Drivers of Health     Food Insecurity: Low Risk  (4/22/2025)    Food Insecurity     Within the past 12 months, did you worry that your food would run out before you got money to buy more?:  No     Within the past 12 months, did the food you bought just not last and you didn t have money to get more?: No   Depression: Not at risk (1/27/2025)    PHQ-2     PHQ-2 Score: 0   Housing Stability: Low Risk  (4/22/2025)    Housing Stability     Do you have housing? : Yes     Are you worried about losing your housing?: No   Tobacco Use: Medium Risk (4/18/2025)    Received from [facility]    Patient History     Smoking Tobacco Use: Former     Smokeless Tobacco Use: Unknown     Passive Exposure: Not on file   Financial Resource Strain: Low Risk  (4/22/2025)    Financial Resource Strain     Within the past 12 months, have you or your family members you live with been unable to get utilities (heat, electricity) when it was really needed?: No   Alcohol Use: Not At Risk (10/7/2019)    AUDIT-C     Frequency of Alcohol Consumption: 2-4 times a month     Average Number of Drinks: 1 or 2     Frequency of Binge Drinking: Never   Transportation Needs: Low Risk  (4/22/2025)    Transportation Needs     Within the past 12 months, has lack of transportation kept you from medical appointments, getting your medicines, non-medical meetings or appointments, work, or from getting things that you need?: No   Physical Activity: Unknown (3/20/2024)    Exercise Vital Sign     Days of Exercise per Week: 3 days     Minutes of Exercise per Session: Not on file   Interpersonal Safety: Low Risk  (4/22/2025)    Interpersonal Safety     Do you feel physically and emotionally safe where you currently live?: Yes     Within the past 12 months, have you been hit, slapped, kicked or otherwise physically hurt by someone?: No     Within the past 12 months, have you been humiliated or emotionally abused in other ways by your partner or ex-partner?: No   Stress: Stress Concern Present (3/20/2024)    [place] [employer] - Occupational Stress Questionnaire     Feeling of Stress : To some extent   Social Connections: Unknown  (3/20/2024)    Social Connection and Isolation Panel [NHANES]     Frequency of Communication with Friends and Family: Not on file     Frequency of Social Gatherings with Friends and Family: More than three times a week     Attends [employer] Services: Not on file     Active Member of Clubs or Organizations: Not on file     Attends Club or Organization Meetings: Not on file     Marital Status: Not on file   Health Literacy: Not on file       Functional Status:  Prior to admission patient needed assistance:              Mental Health Status:  Mental Health Status: No Current Concerns       Chemical Dependency Status:                Values/Beliefs:  Spiritual, Cultural Beliefs, [place] Practices, Values that affect care:                 Discussed  Partnership in Safe Discharge Planning  document with patient/family: Yes: patient, Kori, Nicol and Shantell present for the conversation    Additional Information:  Writer met with patient and his SO, Kori and two daughters Shantell and Nicol in his room today.  Reviewed the [facility] list of TCUs.  Kori shared that Abundio is indep with cooking, cleaning, and self care.  He uses a straight can while walking and does not have any services; indep at baseline.  Daughters confirm this.  3 TCU choices were made and  referrals sent via DOD.    Next Steps: needs accepting TCU and needs to be medically ready for discharge    Nicki Hair RN

## 2025-04-22 NOTE — PROGRESS NOTES
Orthopedic Surgery  Abundio Beckett  04/22/2025     Admit Date:  4/20/2025  Assessment:   POD: 1 Day Post-Op   Procedure(s):  Right hip/femur cephalomedullary nail     Patient resting comfortably in bed    Patient reports no pain at rest, but has pain when he tries to move  Tolerating oral intake  Denies nausea or vomiting  Denies chest pain or shortness of breath    Temp:  [97.3  F (36.3  C)-98.2  F (36.8  C)] 98.2  F (36.8  C)  Pulse:  [53-77] 72  Resp:  [10-16] 12  BP: (109-170)/(57-89) 146/79  SpO2:  [93 %-99 %] 95 %    Alert and oriented  Distal dressing is clean, dry, and intact, proximal has serous/bloody drainage  Minimal erythema of the surrounding skin   Bilateral calves are soft, non-tender  Right lower extremity is NVI  Sensation intact bilateral lower extremities  Patient able to resist dorsi and plantar flexion bilaterally  +Dp pulse    Labs:  Recent Labs   Lab Test 04/21/25  0840 04/20/25 2045 08/01/24  0829   WBC 9.4 6.2 5.7   HGB 13.9 14.0 14.8   * 146* 129*         Plan:   Continue SCD's and Lovenox in house for DVT prophylaxis, may   discharge to TCU on Lovenox or DOAC until 6 weeks or discharge   from TCU (whichever comes first), then home on ASA 81 mg   BID x 6 weeks    24 hours IV antibiotics   Mobilize with PT/OT    WBAT RLE,  ROM as tolerated    Continue current pain regimen   Dressings: Keep intact.  Change if >60% saturated or peeling off    Follow-up: 2 weeks post-op with Miguel Ángel/Dr Junior's team    Disposition:    Anticipate d/c to TCU when medically cleared and progressing in PT    Kelly Messer PA-C  [facility]

## 2025-04-22 NOTE — PROGRESS NOTES
[facility]    Hospitalist Progress Note    Assessment & Plan   Abundio Beckett is a 89 year old male with history including HTN; hypothyroidism; h/o CVA; who presents with fall.  According to HPI he fell in his [place] apartment.  He stumbled on the carpet while walking in the hallway.  Fell on the right hip.  Presented with right hip pain.  He was afebrile, labs within normal limit on presentation platelets 1 46K, potassium 3.3 rest of BMP normal.  Pelvis x-rays showed acute displaced and angulated right proximal femoral fracture with intertrochanteric involvement; noted that the fracture extended towards the base of the femoral neck.     Head CT w/o contrast showed no acute intracranial process; chronic intracranial changes.        Acute displaced and angulated right proximal femoral fracture with intertrochanteric involvement due to mechanical fall.  Acute blood loss anemia following surgery  * Initial presentation as above.  - Strict bedrest.  - Underwent right hip/femur cephalomedullary nailing on 4/21  - Pain control, DVT prophylaxis and postop care as per orthopedic surgery.  --Discussed with family regarding discharge planning, patient will need TCU on discharge.  Discussed with social work.  - Baseline hemoglobin is around 14, dropped to 11.2 after surgery, does not require any transfusion since patient remains asymptomatic.     Thrombocytopenia, unclear etiology, possibly medication effect or acute/chronic from other cause.  * Initial presentation as above. Platelets 146K on admit. Pt with h/o thrombocytopenia in chart review, was 115K- 129K range in 2024 and was also 122K  in 2022. On admit, in speaking with pt and his daughter, apparently this may be a chronic issue, though not previously documented; has never seen a Hematologist; daughter notes she has TCP as well.  - Platelets  remain reasonably within limits.  - Consider platelet transfusion if needs a procedure and less than  50,000; or if less than 10,000.     Hypokalemia.  * Initial presentation as above. Potassium 3.3 on admit.  - Order potassium replacement.     Hypertension (benign essential).  - Continue PTA amlodipine with hold parameters.  - Hold PTA furosemide for now to reduce risk for hypovolemia and electrolyte disturbances in the above setting.     Hypothyroidism.  - Continue levothyroxine.     Glaucoma.  - Continue PTA eye drops.     H/o CVA.   * Noted h/o lacunar infarct noted in 2012.  - Noted.     Other chronic medical issues:  HLD. Not on meds.  H/o prostate cancer. S/p prostate surgery in 2000.  - Noted.    Diet :Advance Diet as Tolerated: Regular Diet Adult  DVT Prophylaxis: Enoxaparin (Lovenox) SQ  Code Status: Full Code     45 MINUTES SPENT BY ME on the date of service doing chart review, history, exam, documentation & further activities per the note.  Medically Ready for Discharge: Anticipated Tomorrow    Clinically Significant Risk Factors        # Hypokalemia: Lowest K = 3.3 mmol/L in last 2 days, will replace as needed          # Thrombocytopenia: Lowest platelets = 124 in last 2 days, will monitor for bleeding   # Hypertension: Noted on problem list                # Financial/Environmental Concerns: none         Abbey Frazier MD  Text Page   (7am to 6pm)    Interval History   Resting comfortably, eager to work with PT, family near bedside, discussed plan of care.    -Data reviewed today: I reviewed all new labs and imaging results over the last 24 hours.   Physical Exam     Vital Signs with Ranges  Temp:  [97.3  F (36.3  C)-98.2  F (36.8  C)] 97.5  F (36.4  C)  Pulse:  [53-78] 78  Resp:  [10-14] 12  BP: (109-170)/(57-89) 150/88  SpO2:  [93 %-99 %] 94 %  I/O last 3 completed shifts:  In: 1048.75 [P.O.:250; I.V.:798.75]  Out: 1025 [Urine:900; Blood:125]    Constitutional: Awake, alert, cooperative, no apparent distress  Respiratory: Clear to auscultation bilaterally, no crackles or wheezing  Cardiovascular:  Regular rate and rhythm, normal S1 and S2, and no murmur noted  GI: Normal bowel sounds, soft, non-distended, non-tender  Skin/Integumen: Lower extremity edema present, right hip status post surgery.  Neuro : moving all 4 extremities, no focal deficit noted     Medications   Current Facility-Administered Medications   Medication Dose Route Frequency Provider Last Rate Last Admin    lactated ringers infusion   Intravenous Continuous Miguel Ángel Fierro PA-C   Stopped at 04/22/25 0413     Current Facility-Administered Medications   Medication Dose Route Frequency Provider Last Rate Last Admin    acetaminophen (TYLENOL) tablet 975 mg  975 mg Oral Q8H Miguel Ángel Fierro PA-C   975 mg at 04/22/25 0837    amLODIPine (NORVASC) tablet 5 mg  5 mg Oral Daily Sagar Corbin MD        enoxaparin ANTICOAGULANT (LOVENOX) injection 40 mg  40 mg Subcutaneous Q24H Miguel Ángel Fierro PA-C   40 mg at 04/22/25 0943    ketoconazole (NIZORAL) 2 % cream   Topical Daily Sagar Corbin MD        latanoprost (XALATAN) 0.005 % ophthalmic solution 1 drop  1 drop Both Eyes QPM Sagar Corbin MD   1 drop at 04/21/25 1944    levothyroxine (SYNTHROID/LEVOTHROID) tablet 137 mcg  137 mcg Oral Daily Sagar Corbin MD   137 mcg at 04/22/25 0838    polyethylene glycol (MIRALAX) Packet 17 g  17 g Oral Daily Miguel Ángel Fierro PA-C   17 g at 04/22/25 0839    senna-docusate (SENOKOT-S/PERICOLACE) 8.6-50 MG per tablet 1 tablet  1 tablet Oral BID Miguel Ángel Fierro PA-C   1 tablet at 04/22/25 0839    sodium chloride (PF) 0.9% PF flush 3 mL  3 mL Intracatheter Q8H Miguel Ángel Fierro PA-C        timolol maleate (TIMOPTIC) 0.5 % ophthalmic solution 1 drop  1 drop Both Eyes BID Sagar Corbin MD   1 drop at 04/22/25 0839       Data   Recent Labs   Lab 04/22/25  0912 04/22/25  0708 04/21/25  0840 04/20/25  2045   WBC 9.1  --  9.4 6.2   HGB 11.2*  --  13.9 14.0   MCV 98  --  96 96   *  --  146* 146*   NA  --   --  137 138    POTASSIUM  --  4.4 4.3 3.3*   CHLORIDE  --   --  102 101   CO2  --   --  25 27   BUN  --   --  13.0 16.5   CR  --   --  0.73 0.87   ANIONGAP  --   --  10 10   GHISLAINE  --   --  9.0 8.9   GLC  --  134* 130* 135*     Recent Labs   Lab 04/22/25  0708 04/21/25  0840 04/20/25 2045   * 130* 135*       Imaging:   Recent Results (from the past 24 hours)   XR Surgery EBONI Fluoro Less Than 5 Min w Stills    Narrative    This exam was marked as non-reportable because it will not be read by a   radiologist or a [facility] non-radiologist provider.

## 2025-04-22 NOTE — PROGRESS NOTES
04/22/25 1042   Appointment Info   Signing Clinician's Name / Credentials (PT) Gen Lewis, PT, DPT   Rehab Comments (PT) WBAT RLE   Living Environment   People in Home alone   Current Living Arrangements apartment  (senior living)   Home Accessibility no concerns  (has elevator)   Transportation Anticipated family or friend will provide   Living Environment Comments Patient lives in apartment, has elevator. Patient has tub shower and walk-in shower, has grab bars, and built-in shower bench   Self-Care   Usual Activity Tolerance moderate   Current Activity Tolerance poor   Equipment Currently Used at Home cane, straight;grab bar, tub/shower   Fall history within last six months yes   Number of times patient has fallen within last six months 2   Activity/Exercise/Self-Care Comment Patient reported being IND with mobility and cares at baseline, uses cane outside of apartment.   General Information   Onset of Illness/Injury or Date of Surgery 04/20/25   Referring Physician Miguel Ángel Fierro PA-C   Patient/Family Therapy Goals Statement (PT) Patient would like to get better   Pertinent History of Current Problem (include personal factors and/or comorbidities that impact the POC) 89 year old male with history including HTN; hypothyroidism; h/o CVA; who presented to the ED following a fall. POD #1 R hip/femur cephalomedullary nail,   Existing Precautions/Restrictions fall;weight bearing   Weight-Bearing Status - RLE weight-bearing as tolerated   Cognition   Affect/Mental Status (Cognition) WFL   Orientation Status (Cognition) oriented x 4   Follows Commands (Cognition) WFL   Pain Assessment   Patient Currently in Pain Yes, see Vital Sign flowsheet  (RLE pain)   Range of Motion (ROM)   ROM Comment RLE limited ROM post-surgical   Strength (Manual Muscle Testing)   Strength Comments post-surgical weakness RLE   Bed Mobility   Comment, (Bed Mobility) supine to sit with max assist x1   Transfers   Comment, (Transfers)  sit<>stand with mod keegan x1 and FWW   Gait/Stairs (Locomotion)   Comment, (Gait/Stairs) unable to ambulate on today's date.   Balance   Balance Comments requires BUE support and assist x1 for standing balance   Sensory Examination   Sensory Perception patient reports no sensory changes   Clinical Impression   Criteria for Skilled Therapeutic Intervention Yes, treatment indicated   PT Diagnosis (PT) impaired mobility   Influenced by the following impairments post-surgical weakness, reduced activity tolerance, impaired RLE ROM, increased pain levels   Functional limitations due to impairments impaired functional mobility, impaired gait, transfers, bed mobility   Clinical Presentation (PT Evaluation Complexity) stable   Clinical Presentation Rationale clinical judgment   Clinical Decision Making (Complexity) low complexity   Planned Therapy Interventions (PT) balance training;bed mobility training;gait training;neuromuscular re-education;patient/family education;stair training;strengthening;transfer training;progressive activity/exercise;home exercise program   Risk & Benefits of therapy have been explained evaluation/treatment results reviewed;care plan/treatment goals reviewed;risks/benefits reviewed;current/potential barriers reviewed;participants voiced agreement with care plan;participants included;patient   PT Total Evaluation Time   PT Eval, Low Complexity Minutes (65923) 5   Physical Therapy Goals   PT Frequency 5x/week   PT Predicted Duration/Target Date for Goal Attainment 05/02/25   PT Goals Bed Mobility;Transfers;Gait   PT: Bed Mobility Supervision/stand-by assist;Supine to/from sit   PT: Transfers Supervision/stand-by assist;Sit to/from stand;Assistive device   PT: Gait Supervision/stand-by assist;Rolling walker;50 feet   Interventions   Interventions Quick Adds Gait Training;Therapeutic Activity   Therapeutic Activity   Therapeutic Activities: dynamic activities to improve functional performance Minutes  (61447) 28   Symptoms Noted During/After Treatment Fatigue;Increased pain;Shortness of breath   Treatment Detail/Skilled Intervention Patient supine in bed at beginning of session, agreeable to participate in PT. Educated on WBAT status to RLE. Time during session for room set-up and equipment retrieval. Patient performing supine to sit transfer with max assist x1, HOB elevated, and use of bedrail. Cues and facilitation for sequencing, assist for positioning RLE and trunk upright. Cues for scooting hips to EOB. Patient performing sit<>stand from EOB with FWW and mod assist x1, cues for hand placement and sequencing. Lateral weight shifts in standing with CGA and FWW. Performing stand pivot transfer from EOB to recliner with mod assist x1 and FWW. Assist for balance and walker management. Increased time needed to complete transfer. Cues for sequencing throughout. Unable to lift and progress LLE, needing to scoot/slide foot on ground secondary to limited ability for WB on RLE. Cues for increased step length with RLE. Cues for reaching for armrest, stand to sit with mod assist x1. Assist x2 and sheet for scooting hips back in chair. Patient seated in recliner at end of session with call light next to him and chair alarm on. All needs within reach. Discussed discharge recommendations for TCU.   PT Discharge Planning   PT Plan repeated sit<>stands and stand pivot transfers, initiate stair training with wheelchair follow, progress IND with bed mobility   PT Discharge Recommendation (DC Rec) Transitional Care Facility   PT Rationale for DC Rec Patient well below baseline functional mobility. Presents with post-surgical weakness and increased pain levels, decreased activity tolerance, and impaired balance resulting in the need for assist x2 for mobility. Unable to ambulate on today's date. Patient lives alone in accessible apartment, uses cane outside of apartment. Recommend discharge to TCU for improvement of strength,  activity tolerance, balance, and independence with functional mobility.   PT Brief overview of current status assist colin and elyssa douglas for bed<>chair transfers. Goals of therapy will be to address safe mobility and make recs for d/c to next level of care. Pt and RN will continue to follow all falls risk precautions as documented by RN staff while hospitalized.   PT Total Distance Amb During Session (feet) 0   Physical Therapy Time and Intention   Timed Code Treatment Minutes 28   Total Session Time (sum of timed and untimed services) 33

## 2025-04-22 NOTE — PROGRESS NOTES
Date/Time: 4/21/25 7529 - 157  Diagnosis: Fall, Right hip fracture, cephalomedullary nailing  POD#:  0 - 1  Mental Status: AOX3, intermittent confusion  Activity/Dangle: Not OOB this shift  Diet: Regular  Pain: Denied  Beltran/Voiding: External Cath, incontinent  Tele/Restraints/ISO: NA  O2/LDA: IV SL  DC Date: TBD

## 2025-04-22 NOTE — PLAN OF CARE
Goal Outcome Evaluation:    Date/Time: 4/22/25    Trauma/Ortho/Medical (Choose one) trauma    Diagnosis: right hip fracture, IM nailing  POD#: 1  Mental Status: A&Ox4 with forgetfulness  Activity/dangle: up with assist of 2 and elyssa steady to chair, stand at bedside with walker  Diet: regular  Pain: tylenol/oxycodone  Beltran/Voiding: external cath  Tele/Restraints/Iso: na  02/LDA: IV SL, external cath  D/C Date: tbd to TCU  Other Info: dressing C,D,I, small amount dried sanguinous drainage, changed by ortho PA

## 2025-04-23 ENCOUNTER — APPOINTMENT (OUTPATIENT)
Dept: PHYSICAL THERAPY | Facility: CLINIC | Age: OVER 89
DRG: 481 | End: 2025-04-23
Payer: COMMERCIAL

## 2025-04-23 LAB
GLUCOSE SERPL-MCNC: 135 MG/DL (ref 70–99)
HGB BLD-MCNC: 10.8 G/DL (ref 13.3–17.7)
POTASSIUM SERPL-SCNC: 4 MMOL/L (ref 3.4–5.3)

## 2025-04-23 PROCEDURE — 97530 THERAPEUTIC ACTIVITIES: CPT | Mod: GP

## 2025-04-23 PROCEDURE — 85018 HEMOGLOBIN: CPT | Performed by: INTERNAL MEDICINE

## 2025-04-23 PROCEDURE — 250N000011 HC RX IP 250 OP 636

## 2025-04-23 PROCEDURE — 84132 ASSAY OF SERUM POTASSIUM: CPT | Performed by: INTERNAL MEDICINE

## 2025-04-23 PROCEDURE — 250N000013 HC RX MED GY IP 250 OP 250 PS 637: Performed by: INTERNAL MEDICINE

## 2025-04-23 PROCEDURE — 36415 COLL VENOUS BLD VENIPUNCTURE: CPT | Performed by: INTERNAL MEDICINE

## 2025-04-23 PROCEDURE — 250N000013 HC RX MED GY IP 250 OP 250 PS 637

## 2025-04-23 PROCEDURE — 99232 SBSQ HOSP IP/OBS MODERATE 35: CPT | Performed by: INTERNAL MEDICINE

## 2025-04-23 PROCEDURE — 120N000001 HC R&B MED SURG/OB

## 2025-04-23 PROCEDURE — 82947 ASSAY GLUCOSE BLOOD QUANT: CPT | Performed by: INTERNAL MEDICINE

## 2025-04-23 RX ORDER — AMOXICILLIN 250 MG
1 CAPSULE ORAL 2 TIMES DAILY PRN
Qty: 30 TABLET | Refills: 0 | DISCHARGE
Start: 2025-04-23

## 2025-04-23 RX ORDER — POLYETHYLENE GLYCOL 3350 17 G/17G
17 POWDER, FOR SOLUTION ORAL 2 TIMES DAILY PRN
Status: DISCONTINUED | OUTPATIENT
Start: 2025-04-23 | End: 2025-04-23

## 2025-04-23 RX ORDER — ENOXAPARIN SODIUM 100 MG/ML
40 INJECTION SUBCUTANEOUS EVERY 24 HOURS
DISCHARGE
Start: 2025-04-24 | End: 2025-05-09

## 2025-04-23 RX ORDER — AMOXICILLIN 250 MG
1 CAPSULE ORAL AT BEDTIME
Status: DISCONTINUED | OUTPATIENT
Start: 2025-04-23 | End: 2025-04-24 | Stop reason: HOSPADM

## 2025-04-23 RX ORDER — OXYCODONE HYDROCHLORIDE 5 MG/1
5 TABLET ORAL EVERY 6 HOURS PRN
Qty: 20 TABLET | Refills: 0 | Status: SHIPPED | OUTPATIENT
Start: 2025-04-23 | End: 2025-04-25

## 2025-04-23 RX ORDER — ACETAMINOPHEN 325 MG/1
975 TABLET ORAL EVERY 8 HOURS PRN
Qty: 100 TABLET | Refills: 0 | DISCHARGE
Start: 2025-04-23 | End: 2025-04-25

## 2025-04-23 RX ADMIN — ACETAMINOPHEN 975 MG: 325 TABLET, FILM COATED ORAL at 08:28

## 2025-04-23 RX ADMIN — OXYCODONE HYDROCHLORIDE 5 MG: 5 TABLET ORAL at 15:09

## 2025-04-23 RX ADMIN — AMLODIPINE BESYLATE 5 MG: 5 TABLET ORAL at 15:09

## 2025-04-23 RX ADMIN — TIMOLOL MALEATE 1 DROP: 5 SOLUTION OPHTHALMIC at 19:53

## 2025-04-23 RX ADMIN — ACETAMINOPHEN 975 MG: 325 TABLET, FILM COATED ORAL at 23:51

## 2025-04-23 RX ADMIN — ENOXAPARIN SODIUM 40 MG: 40 INJECTION SUBCUTANEOUS at 09:57

## 2025-04-23 RX ADMIN — LEVOTHYROXINE SODIUM 137 MCG: 137 TABLET ORAL at 08:28

## 2025-04-23 RX ADMIN — TIMOLOL MALEATE 1 DROP: 5 SOLUTION OPHTHALMIC at 08:37

## 2025-04-23 RX ADMIN — POLYETHYLENE GLYCOL 3350 17 G: 17 POWDER, FOR SOLUTION ORAL at 08:29

## 2025-04-23 RX ADMIN — LATANOPROST 1 DROP: 50 SOLUTION OPHTHALMIC at 19:54

## 2025-04-23 RX ADMIN — SENNOSIDES AND DOCUSATE SODIUM 1 TABLET: 50; 8.6 TABLET ORAL at 19:54

## 2025-04-23 RX ADMIN — OXYCODONE HYDROCHLORIDE 5 MG: 5 TABLET ORAL at 09:55

## 2025-04-23 RX ADMIN — SENNOSIDES AND DOCUSATE SODIUM 2 TABLET: 50; 8.6 TABLET ORAL at 22:44

## 2025-04-23 RX ADMIN — ACETAMINOPHEN 975 MG: 325 TABLET, FILM COATED ORAL at 15:09

## 2025-04-23 ASSESSMENT — ACTIVITIES OF DAILY LIVING (ADL)
ADLS_ACUITY_SCORE: 56
ADLS_ACUITY_SCORE: 50
ADLS_ACUITY_SCORE: 56
ADLS_ACUITY_SCORE: 56
ADLS_ACUITY_SCORE: 57
ADLS_ACUITY_SCORE: 56
ADLS_ACUITY_SCORE: 56
ADLS_ACUITY_SCORE: 57
ADLS_ACUITY_SCORE: 56
ADLS_ACUITY_SCORE: 50
ADLS_ACUITY_SCORE: 57
ADLS_ACUITY_SCORE: 50
ADLS_ACUITY_SCORE: 50
ADLS_ACUITY_SCORE: 57
ADLS_ACUITY_SCORE: 56
ADLS_ACUITY_SCORE: 57

## 2025-04-23 NOTE — PROGRESS NOTES
Orthopedic Surgery  Abundio Beckett  04/23/2025     Admit Date:  4/20/2025  Assessment:   POD: 2 Days Post-Op   Procedure(s):  Right hip/femur cephalomedullary nail     Patient resting comfortably in bed    Patient reports continued pain with movement of the right LE  Tolerating oral intake  Denies nausea or vomiting.  Has not had BM yet.   Denies chest pain or shortness of breath    Temp:  [98.7  F (37.1  C)-99.4  F (37.4  C)] 99.1  F (37.3  C)  Pulse:  [78-91] 91  Resp:  [18] 18  BP: (138-159)/(74-88) 158/88  SpO2:  [93 %-97 %] 93 %    Alert and oriented  Distal dressing is clean, dry, and intact, proximal has scant serous/bloody drainage  Minimal erythema of the surrounding skin   Bilateral calves are soft, non-tender  Right lower extremity is NVI  Sensation intact bilateral lower extremities  Patient able to resist dorsi and plantar flexion bilaterally  +Dp pulse    Labs:  Recent Labs   Lab Test 04/23/25  0749 04/22/25  0912 04/21/25  0840 04/20/25 2045   WBC  --  9.1 9.4 6.2   HGB 10.8* 11.2* 13.9 14.0   PLT  --  124* 146* 146*         Plan:   Continue SCD's and Lovenox in house for DVT prophylaxis, may   discharge to TCU on Lovenox or DOAC until 6 weeks or discharge   from TCU (whichever comes first), then home on ASA 81 mg   BID x 6 weeks     Mobilize with PT/OT    WBAT RLE,  ROM as tolerated    Continue current pain regimen   Dressings: Keep intact.  Change if >60% saturated or peeling off.  Change to aquacel on day of discharge.    Follow-up: 2 weeks post-op with Miguel Ángel/Dr Junior's team    Disposition:    Anticipate d/c to TCU when medically cleared and progressing in PT.     Solange Pollock PA-C  [facility]

## 2025-04-23 NOTE — PLAN OF CARE
Goal Outcome Evaluation:      Plan of Care Reviewed With: patient    Overall Patient Progress: no changeOverall Patient Progress: no change         Date/Time: 4/21/25 2840 - 114  Diagnosis: Fall, Right hip fracture, cephalomedullary nailing  POD#:  3  Mental Status: AOX4  Activity/Dangle: Not OOB this shift  Diet: Regular  Pain: Denied  Beltran/Voiding: External Cath, incontinent  Tele/Restraints/ISO: NA  O2/LDA: IV SL, RA  DC Date: TBD  Other Information: Patient was less confused overnight. BG done with Lab this AM.

## 2025-04-23 NOTE — PROGRESS NOTES
[facility]    Hospitalist Progress Note    Assessment & Plan   Abundio Beckett is a 89 year old male with history including HTN; hypothyroidism; h/o CVA; who presents with fall.  According to HPI he fell in his [place] apartment.  He stumbled on the carpet while walking in the hallway.  Fell on the right hip.  Presented with right hip pain.  He was afebrile, labs within normal limit on presentation platelets 1 46K, potassium 3.3 rest of BMP normal.  Pelvis x-rays showed acute displaced and angulated right proximal femoral fracture with intertrochanteric involvement; noted that the fracture extended towards the base of the femoral neck.     Head CT w/o contrast showed no acute intracranial process; chronic intracranial changes.        Acute displaced and angulated right proximal femoral fracture with intertrochanteric involvement due to mechanical fall.  Acute blood loss anemia following surgery  * Initial presentation as above.  - Strict bedrest.  - Underwent right hip/femur cephalomedullary nailing on 4/21  - Pain control, DVT prophylaxis and postop care as per orthopedic surgery.  --Discussed with family regarding discharge planning, patient will need TCU on discharge.  Discussed with social work.  - Baseline hemoglobin is around 14, dropped to 11.2 after surgery, does not require any transfusion since patient remains asymptomatic./23 hemoglobin is 10.8, remaining stable.  Patient denies any dizziness.  --Discussed with social work, will plan discharge to rehab on 4/24.     Thrombocytopenia, unclear etiology, possibly medication effect or acute/chronic from other cause.  * Initial presentation as above. Platelets 146K on admit. Pt with h/o thrombocytopenia in chart review, was 115K- 129K range in 2024 and was also 122K  in 2022. On admit, in speaking with pt and his daughter, apparently this may be a chronic issue, though not previously documented; has never seen a Hematologist; daughter  notes she has TCP as well.  - Platelets  remain reasonably within limits.  - Consider platelet transfusion if needs a procedure and less than 50,000; or if less than 10,000.  --Will repeat CBC in a.m.  Hypokalemia.  * Initial presentation as above. Potassium 3.3 on admit.  - Order potassium replacement.     Hypertension (benign essential).  - Continue PTA amlodipine with hold parameters.  - Hold PTA furosemide for now to reduce risk for hypovolemia and electrolyte disturbances in the above setting.     Hypothyroidism.  - Continue levothyroxine.     Glaucoma.  - Continue PTA eye drops.     H/o CVA.   * Noted h/o lacunar infarct noted in 2012.  - Noted.     Other chronic medical issues:  HLD. Not on meds.  H/o prostate cancer. S/p prostate surgery in 2000.  - Noted.    Diet :Advance Diet as Tolerated: Regular Diet Adult  DVT Prophylaxis: Enoxaparin (Lovenox) SQ  Code Status: Full Code     43 MINUTES SPENT BY ME on the date of service doing chart review, history, exam, documentation & further activities per the note.  Medically Ready for Discharge: Anticipated Tomorrow    Clinically Significant Risk Factors                 # Thrombocytopenia: Lowest platelets = 124 in last 2 days, will monitor for bleeding   # Hypertension: Noted on problem list                # Financial/Environmental Concerns: none         Abbey Frazier MD  Text Page   (7am to 6pm)    Interval History   Patient mentions pain with activity, he did ambulate with PT.  Tentative plan for discharge tomorrow discussed.    -Data reviewed today: I reviewed all new labs and imaging results over the last 24 hours.   Physical Exam     Vital Signs with Ranges  Temp:  [98.7  F (37.1  C)-99.4  F (37.4  C)] 98.7  F (37.1  C)  Pulse:  [78-91] 91  Resp:  [18] 18  BP: (138-159)/(74-88) 158/88  SpO2:  [93 %-97 %] 93 %  I/O last 3 completed shifts:  In: 780 [P.O.:780]  Out: 1350 [Urine:1350]    Constitutional: Awake, alert, cooperative, no apparent  distress  Respiratory: Clear to auscultation bilaterally, no crackles or wheezing  Cardiovascular: Regular rate and rhythm, normal S1 and S2, and no murmur noted  GI: Normal bowel sounds, soft, non-distended, non-tender  Skin/Integumen: Lower extremity edema present, right hip status post surgery.  Neuro : moving all 4 extremities, no focal deficit noted     Medications   Current Facility-Administered Medications   Medication Dose Route Frequency Provider Last Rate Last Admin    lactated ringers infusion   Intravenous Continuous Miguel Ángel Fierro PA-C   Stopped at 04/22/25 0413     Current Facility-Administered Medications   Medication Dose Route Frequency Provider Last Rate Last Admin    acetaminophen (TYLENOL) tablet 975 mg  975 mg Oral Q8H Miguel Ángel Fierro PA-C   975 mg at 04/23/25 0828    amLODIPine (NORVASC) tablet 5 mg  5 mg Oral Daily Sagar Corbin MD   5 mg at 04/22/25 1343    enoxaparin ANTICOAGULANT (LOVENOX) injection 40 mg  40 mg Subcutaneous Q24H Miguel Ángel Fierro PA-C   40 mg at 04/23/25 0957    ketoconazole (NIZORAL) 2 % cream   Topical Daily Sagar Corbin MD        latanoprost (XALATAN) 0.005 % ophthalmic solution 1 drop  1 drop Both Eyes QPM Sagar Corbin MD   1 drop at 04/22/25 2007    levothyroxine (SYNTHROID/LEVOTHROID) tablet 137 mcg  137 mcg Oral Daily Sagar Corbin MD   137 mcg at 04/23/25 0828    polyethylene glycol (MIRALAX) Packet 17 g  17 g Oral Daily Miguel Ángel Fierro PA-C   17 g at 04/23/25 0829    senna-docusate (SENOKOT-S/PERICOLACE) 8.6-50 MG per tablet 1 tablet  1 tablet Oral BID Miguel Ángel Fierro PA-C   1 tablet at 04/22/25 2006    sodium chloride (PF) 0.9% PF flush 3 mL  3 mL Intracatheter Q8H Miguel Ángel Fierro PA-C   3 mL at 04/23/25 0833    timolol maleate (TIMOPTIC) 0.5 % ophthalmic solution 1 drop  1 drop Both Eyes BID Sagar Corbin MD   1 drop at 04/23/25 0837       Data   Recent Labs   Lab 04/23/25  0749 04/22/25  0912 04/22/25  0708  04/21/25  0840 04/20/25 2045   WBC  --  9.1  --  9.4 6.2   HGB 10.8* 11.2*  --  13.9 14.0   MCV  --  98  --  96 96   PLT  --  124*  --  146* 146*   NA  --   --   --  137 138   POTASSIUM 4.0  --  4.4 4.3 3.3*   CHLORIDE  --   --   --  102 101   CO2  --   --   --  25 27   BUN  --   --   --  13.0 16.5   CR  --   --   --  0.73 0.87   ANIONGAP  --   --   --  10 10   GHISLAINE  --   --   --  9.0 8.9   *  --  134* 130* 135*     Recent Labs   Lab 04/23/25  0749 04/22/25  0708 04/21/25  0840 04/20/25 2045   * 134* 130* 135*       Imaging:   No results found for this or any previous visit (from the past 24 hours).

## 2025-04-23 NOTE — PLAN OF CARE
Goal Outcome Evaluation:    Date/Time: 4/23/25    Trauma/Ortho/Medical (Choose one) trauma    Diagnosis: right hip fx, s/p IM nailing  POD#: 2  Mental Status: A&Ox4, intermittent forgetfulness  Activity/dangle: up with assist of 2 and elyssa steady  Diet: regular  Pain: oxycodone/tylenol  Beltran/Voiding: external cath/incontinence  Tele/Restraints/Iso: na  02/LDA: IV SL,ext cath  D/C Date: tomorrow to TCU  Other Info: dressing C,D,I

## 2025-04-23 NOTE — PROGRESS NOTES
Care Management Follow Up    Length of Stay (days): 3    Expected Discharge Date: 04/24/2025     Concerns to be Addressed:       Patient plan of care discussed at interdisciplinary rounds: Yes    Anticipated Discharge Disposition: Transitional Care              Anticipated Discharge Services:    Anticipated Discharge DME: Walker    Patient/family educated on Medicare website which has current facility and service quality ratings: yes  Education Provided on the Discharge Plan:    Patient/Family in Agreement with the Plan: yes    Referrals Placed by CM/SW: Post Acute Facilities  Private pay costs discussed: Not applicable    Discussed  Partnership in Safe Discharge Planning  document with patient/family: No     Handoff Completed: No, handoff not indicated or clinically appropriate    Additional Information:  [facility] states [facility] have [facility] today for pt. She states it is a private room with private fee of $36 per day. Writer will talk to doctor to see if pt medically stable for discharge.  Writer spoke with bedside Rn who states pt will need stretcher transport due to no trunk support and assist of 2 with elyssa lewis.  Writer spoke with Doctor Frazier who states pt not medically stable for discharge today.Doctor Frazier would like writer to set up discharge for tomorrow.  Writer [facility] who states that she  can take pt tomorrow as well as he is not medically stable today.  Writer spoke with  [employer] transport and transport was set up for stretcher [id number] am tomorrow.   Writer update Doctor Frazier of transport time for tomorrow and needing discharge orders by 830 am tomorrow. Doctor states understanding. Writer updated charge Rn of transport time tomorrow.  Writer met with pt at bedside. Pt's daughter was also present. Pt and daughter are agreeable to discharge tomorrow to [facility]. Writer did inform them of 36 dollar per day private room  fee. Pt is agreeable to this cost. Writer did inform them that writer cannot guarantee insurance coverage for tcu stay cost as writer is not pt's insurance company. Pt and daughter state understanding. Writer informed them of RN  recommendation for stretcher transport. Pt and pt's daughter are agreeable. Writer informed them that transport will be billed to insurance but writer cannot guarantee whether or not pt's insurance will cover transport cost as writer is not pt's insurance company. Pt and pt's daughter state understanding. Pt and daughter are aware of transport time tomorrow and are agreeable.   Writer completed pcs form.       Next Steps: discharge tomorrow to tcu.     MARIZOL JensenW  Social Work  [facility]

## 2025-04-24 ENCOUNTER — DOCUMENTATION ONLY (OUTPATIENT)
Dept: GERIATRICS | Facility: CLINIC | Age: OVER 89
End: 2025-04-24
Payer: COMMERCIAL

## 2025-04-24 VITALS
WEIGHT: 212.52 LBS | HEART RATE: 76 BPM | OXYGEN SATURATION: 93 % | TEMPERATURE: 99.1 F | SYSTOLIC BLOOD PRESSURE: 150 MMHG | DIASTOLIC BLOOD PRESSURE: 75 MMHG | BODY MASS INDEX: 30.49 KG/M2 | RESPIRATION RATE: 16 BRPM

## 2025-04-24 LAB
CREAT SERPL-MCNC: 0.72 MG/DL (ref 0.67–1.17)
EGFRCR SERPLBLD CKD-EPI 2021: 87 ML/MIN/1.73M2
ERYTHROCYTE [DISTWIDTH] IN BLOOD BY AUTOMATED COUNT: 12.7 % (ref 10–15)
HCT VFR BLD AUTO: 30.4 % (ref 40–53)
HGB BLD-MCNC: 10.3 G/DL (ref 13.3–17.7)
MCH RBC QN AUTO: 32.8 PG (ref 26.5–33)
MCHC RBC AUTO-ENTMCNC: 33.9 G/DL (ref 31.5–36.5)
MCV RBC AUTO: 97 FL (ref 78–100)
PLATELET # BLD AUTO: 117 10E3/UL (ref 150–450)
POTASSIUM SERPL-SCNC: 4.1 MMOL/L (ref 3.4–5.3)
RBC # BLD AUTO: 3.14 10E6/UL (ref 4.4–5.9)
WBC # BLD AUTO: 7.1 10E3/UL (ref 4–11)

## 2025-04-24 PROCEDURE — 85014 HEMATOCRIT: CPT | Performed by: INTERNAL MEDICINE

## 2025-04-24 PROCEDURE — 250N000013 HC RX MED GY IP 250 OP 250 PS 637

## 2025-04-24 PROCEDURE — 250N000011 HC RX IP 250 OP 636

## 2025-04-24 PROCEDURE — 84132 ASSAY OF SERUM POTASSIUM: CPT | Performed by: INTERNAL MEDICINE

## 2025-04-24 PROCEDURE — 82565 ASSAY OF CREATININE: CPT

## 2025-04-24 PROCEDURE — 36415 COLL VENOUS BLD VENIPUNCTURE: CPT

## 2025-04-24 PROCEDURE — 99239 HOSP IP/OBS DSCHRG MGMT >30: CPT | Performed by: INTERNAL MEDICINE

## 2025-04-24 PROCEDURE — 250N000013 HC RX MED GY IP 250 OP 250 PS 637: Performed by: INTERNAL MEDICINE

## 2025-04-24 RX ADMIN — ACETAMINOPHEN 975 MG: 325 TABLET, FILM COATED ORAL at 09:00

## 2025-04-24 RX ADMIN — LEVOTHYROXINE SODIUM 137 MCG: 137 TABLET ORAL at 09:00

## 2025-04-24 RX ADMIN — ENOXAPARIN SODIUM 40 MG: 40 INJECTION SUBCUTANEOUS at 09:05

## 2025-04-24 RX ADMIN — POLYETHYLENE GLYCOL 3350 17 G: 17 POWDER, FOR SOLUTION ORAL at 08:59

## 2025-04-24 RX ADMIN — OXYCODONE HYDROCHLORIDE 5 MG: 5 TABLET ORAL at 09:00

## 2025-04-24 RX ADMIN — TIMOLOL MALEATE 1 DROP: 5 SOLUTION OPHTHALMIC at 09:01

## 2025-04-24 RX ADMIN — SENNOSIDES AND DOCUSATE SODIUM 1 TABLET: 50; 8.6 TABLET ORAL at 09:00

## 2025-04-24 ASSESSMENT — ACTIVITIES OF DAILY LIVING (ADL)
ADLS_ACUITY_SCORE: 50
ADLS_ACUITY_SCORE: 48
ADLS_ACUITY_SCORE: 52
ADLS_ACUITY_SCORE: 48
ADLS_ACUITY_SCORE: 50
ADLS_ACUITY_SCORE: 48
ADLS_ACUITY_SCORE: 50

## 2025-04-24 NOTE — PROGRESS NOTES
[facility]  PRIMARY CARE PROVIDER AND CLINIC:  Colton Chris MD, [address] [place] [address] / [facility] [address]  Chief Complaint   Patient presents with    Hospital F/U     [facility] Record Number:  [id number]  Place of Service where encounter took place:  No question data found.    Abundio Beckett  is a 89 year old  (1935), admitted to the above facility from  [facility]. Hospital stay 4/20/25 through 4/24/25..  Admitted to this facility for  rehab, medical management, and nursing care.    HPI:    HPI information obtained from: facility chart records, facility staff, patient report, [facility] chart review, and Care Everywhere [facility] chart review.   Brief Summary of Hospital Course: ***  Updates on Status Since Skilled nursing Admission: ***    CODE STATUS/ADVANCE DIRECTIVES DISCUSSION:   CPR/Full code   Patient's living condition: lives alone  ALLERGIES: Pravachol [pravastatin] and Simvastatin  PAST MEDICAL HISTORY:  has a past medical history of Basal cell carcinoma, BENIGN PROSTATIC HYPERTROPHY (9/17/2002), Essential hypertension, benign (11/8/2016), Hyperlipidemia LDL goal <100 (5/29/2012), Hypertrophy (benign) of prostate, HYPOTHYROIDISM NOS (9/17/2002), Lacunar infarction (H) (9/4/2012), and Unspecified hypothyroidism.    He has no past medical history of Malignant melanoma (H) or Squamous cell carcinoma of skin, unspecified.  PAST SURGICAL HISTORY:   has a past surgical history that includes REMV PROSTATE,PERINEAL,RADICAL (01/01/2000); SHOULDER ARTHROSCOPY, DX; REPAIR OF NASAL SEPTUM; and Open reduction internal fixation rodding intramedullar femur fracture table (Right, 4/21/2025).  FAMILY HISTORY: family history is not on file.  SOCIAL HISTORY:   reports that he has never smoked. He has never used smokeless tobacco. He reports current alcohol use. He reports that he does not use drugs.    Post Discharge Medication Reconciliation Status: {ACO Med Rec  "(Provider):537877}  ***  Current Outpatient Medications   Medication Sig Dispense Refill    acetaminophen (TYLENOL) 325 MG tablet Take 3 tablets (975 mg) by mouth every 8 hours as needed for mild pain. 100 tablet 0    amLODIPine (NORVASC) 5 MG tablet TAKE 1 TABLET BY MOUTH DAILY 90 tablet 1    aspirin (ASA) 325 MG EC tablet Take 1 tablet (325 mg) by mouth daily 100 tablet 3    CENTRUM SILVER OR TABS 1 TABLET DAILY      enoxaparin ANTICOAGULANT (LOVENOX) 40 MG/0.4ML syringe Inject 0.4 mLs (40 mg) subcutaneously every 24 hours for 15 days.      furosemide (LASIX) 20 MG tablet TAKE 1 TABLET BY MOUTH DAILY AS  NEEDED FOR EDEMA 100 tablet 2    ketoconazole (NIZORAL) 2 % external cream Apply topically daily. 30 g 3    latanoprost (XALATAN) 0.005 % ophthalmic solution Place 1 drop into both eyes every evening.      levothyroxine (SYNTHROID/LEVOTHROID) 137 MCG tablet Take 1 tablet (137 mcg) by mouth daily. 100 tablet 2    oxyCODONE (ROXICODONE) 5 MG tablet Take 1 tablet (5 mg) by mouth every 6 hours as needed for moderate to severe pain. 20 tablet 0    senna-docusate (SENOKOT-S/PERICOLACE) 8.6-50 MG tablet Take 1 tablet by mouth 2 times daily as needed for constipation. 30 tablet 0    timolol maleate (TIMOPTIC) 0.5 % ophthalmic solution Place 1 drop into both eyes 2 times daily       {Providers Please double check the med list (in the plan section >> meds & orders tab) and Discontinue any of the meds flagged by the TC to be discontinued}  ***  ROS:  10 point ROS of systems including Constitutional, Eyes, Respiratory, Cardiovascular, Gastroenterology, Genitourinary, Integumentary, Musculoskeletal, Psychiatric were all negative except for pertinent positives noted in my HPI.    Vitals:  There were no vitals taken for this visit.  Exam:  GENERAL APPEARANCE:  {[facility] GENERAL APPEARANCE:769202}  ENT:  {[facility] ENT PE:652221::\"Mouth and posterior oropharynx normal, moist mucous membranes\"}  EYES:  {[facility] EYES PE " ":554357::\"EOM, conjunctivae, lids, pupils and irises normal\"}  RESP:  {[facility] RESP PE:918352}  CV:  {[facility] CV PE:719942::\"Palpation and auscultation of heart done \",\"regular rate and rhythm, no murmur, rub, or gallop\"}  ABDOMEN:  {[facility] GI PE:396083::\"normal bowel sounds, soft, nontender, no hepatosplenomegaly or other masses\"}  M/S:   {[facility] M/S PE:199705}  SKIN:  {[facility] SK[facility] PE:392577}  NEURO:   {[facility] NEURO PE:566313}  PSYCH:  {[facility] PSYCH PE:930981}    Lab/Diagnostic data:  {fgslab:004234}    ASSESSMENT/PLAN:  {FGS DX INITIAL:906637}    {fgsorders:530461}  ***    Total time spent with patient visit at the [facility] nursing facility was {1/2/3/4/5:423800} including {1/2/3/4/5:148035}. Greater than 50% of total time spent with counseling and coordinating care due to ***.     Electronically signed by:  Kym Fraser MA ***  {Providers Please double check the med list (in the plan section >> meds & orders tab) and Discontinue any of the meds flagged by the TC to be discontinued}                 "

## 2025-04-24 NOTE — PLAN OF CARE
Goal Outcome Evaluation:    Date/Time: 4/23/25, 5039-0108     Trauma/Ortho/Medical (Choose one) trauma     Diagnosis: right hip fx, s/p IM nailing  POD#: 3  Mental Status: A&Ox4, intermittent forgetfulness and confusion.  Activity/dangle: up with assist of 2 and elyssa steady  Diet: regular  Pain: oxycodone/tylenol  Beltran/Voiding: external cath/incontinence  Tele/Restraints/Iso: na  02/LDA: IV SL,ext cath  D/C Date: tomorrow to TCU  Other Info: dressing CDI, CMS intact.

## 2025-04-24 NOTE — PROGRESS NOTES
Care Management Discharge Note    Discharge Date: 04/24/2025       Discharge Disposition: Transitional Care    Discharge Services:  transportation    Discharge DME: Walker    Discharge Transportation: family or friend will provide    Private pay costs discussed: private room/amenity fees and transportation costs    Does the patient's insurance plan have a 3 day qualifying hospital stay waiver?  Yes     Which insurance plan 3 day waiver is available? Alternative insurance waiver    Will the waiver be used for post-acute placement? Yes    PAS Confirmation Code: [id number]  Patient/family educated on Medicare website which has current facility and service quality ratings: yes    Education Provided on the Discharge Plan:    Persons Notified of Discharge Plans: family  Patient/Family in Agreement with the Plan: yes    Handoff Referral Completed: No, handoff not indicated or clinically appropriate    Additional Information:  Patient accepted to [facility] today. Ride scheduled via [employer] stretcher was scheduled between 4719-3842 today. Message sent to provider requesting discharge orders. Updated facility via Epic of time. PAS completed.    PAS-RR    D: Per DHS regulation, SW completed and submitted PAS-RR to [place] [employer] Direct Connect via the Senior LinkAge Line.  PAS-RR confirmation # is : [id number]    I: SW spoke with patient and they are aware a PAS-RR has been submitted.  SW reviewed with patient that they may be contacted for a follow up appointment within 10 days of hospital discharge if their SNF stay is < 30 days.  Contact information for Senior LinkAge Line was also provided.    A: patient verbalized understanding.    P: Further questions may be directed to [facility] LinkAge Line at #[phone], option #4 for PAS-RR staff.    Orders received and discharged.     JUAN LUIS Camacho

## 2025-04-24 NOTE — PLAN OF CARE
Goal Outcome Evaluation:    Date/Time: 4/24/25    Trauma/Ortho/Medical (Choose one)  trauma    Diagnosis: Right hip fx, s/p IM nailing  POD#: 3  Mental Status: A&Ox4  Activity/dangle: up with assist of 2 and elyssa steady  Diet: regular  Pain: oxycodone/tylenol  Beltran/Voiding: external cath, voiding  Tele/Restraints/Iso: na  02/LDA: IV SL  D/C Date: today to TCU  Other Info: pt discharged to TCU via stretcher

## 2025-04-24 NOTE — PROGRESS NOTES
Orthopedic Surgery  Abundio Beckett  04/24/2025     Admit Date:  4/20/2025  Assessment:   POD: 3 Days Post-Op   Procedure(s):  Right hip/femur cephalomedullary nail     Patient resting comfortably in bed    Patient reports pain controlled at rest.   Tolerating oral intake  Denies nausea or vomiting.    Denies chest pain or shortness of breath    Temp:  [98.4  F (36.9  C)-100.1  F (37.8  C)] 99.1  F (37.3  C)  Pulse:  [76-81] 76  Resp:  [16-18] 16  BP: (124-150)/(68-80) 150/75  SpO2:  [93 %-96 %] 93 %    Alert and oriented  Aquacel dressings applied today.  Incisions healing well.    Moderate thigh swelling.   Minimal erythema of the surrounding skin   Bilateral calves are soft, non-tender  Right lower extremity is NVI  Sensation intact bilateral lower extremities  Patient able to resist dorsi and plantar flexion bilaterally  +Dp pulse    Labs:  Recent Labs   Lab Test 04/24/25  0743 04/23/25  0749 04/22/25  0912 04/21/25  0840   WBC 7.1  --  9.1 9.4   HGB 10.3* 10.8* 11.2* 13.9   *  --  124* 146*       Plan:   Continue SCD's and Lovenox in house for DVT prophylaxis, may   discharge to TCU on Lovenox or DOAC until 6 weeks or discharge   from TCU (whichever comes first), then home on ASA 81 mg   BID x 6 weeks     Mobilize with PT/OT    WBAT RLE,  ROM as tolerated    Continue current pain regimen   Dressings: Keep intact.  Change if >60% saturated or peeling off.  Change to aquacel on day of discharge.    Follow-up: 2 weeks post-op with Miguel Ángel/Dr Junior's team    Disposition:    Anticipate d/c to TCU later today.     Solange Pollock PA-C  [facility]

## 2025-04-24 NOTE — DISCHARGE SUMMARY
"[facility]    Discharge Summary  Hospitalist    Date of Admission:  4/20/2025  Date of Discharge:  {DISCHARGE DATE:743636}  Discharging Provider: Abbey Frazier MD    Discharge Diagnoses   ***    History of Present Illness   Abundio Beckett is an 89 year old male who presented with ***    Hospital Course   Abundio Beckett was admitted on 4/20/2025.  The following problems were addressed during his hospitalization:    Active Problems:    Hip fracture (H)      Abbey Frazier MD    Significant Results and Procedures   ***    Pending Results   These results will be followed up by ***  Unresulted Labs Ordered in the Past 30 Days of this Admission       Date and Time Order Name Status Description    4/24/2025 12:01 AM Potassium In process     4/23/2025  6:00 PM Creatinine In process             Code Status   {CODE STATUS      :259616}       Primary Care Physician   Colton Chris    Physical Exam   Temp: 99.1  F (37.3  C) Temp src: Oral BP: (!) 150/75 Pulse: 76   Resp: 16 SpO2: 93 % O2 Device: None (Room air)    Vitals:    04/21/25 0100 04/23/25 0626   Weight: 98 kg (216 lb 0.8 oz) 96.4 kg (212 lb 8.4 oz)     Vital Signs with Ranges  Temp:  [98.4  F (36.9  C)-100.1  F (37.8  C)] 99.1  F (37.3  C)  Pulse:  [76-81] 76  Resp:  [16-18] 16  BP: (124-150)/(68-80) 150/75  SpO2:  [93 %-96 %] 93 %  I/O last 3 completed shifts:  In: 920 [P.O.:920]  Out: 800 [Urine:800]    The patient was examined on the day of discharge.    Discharge Disposition   {                 :4939292::\"Discharged to home\"}  Condition at discharge: {CONDITION:631355::\"Stable\"}    Consultations This Hospital Stay   CARE MANAGEMENT / SOCIAL WORK IP CONSULT  ORTHOPEDIC SURGERY IP CONSULT  PHYSICAL THERAPY ADULT IP CONSULT  OCCUPATIONAL THERAPY ADULT IP CONSULT  SOCIAL WORK IP CONSULT  PHYSICAL THERAPY ADULT IP CONSULT  OCCUPATIONAL THERAPY ADULT IP CONSULT    Time Spent on this Encounter   {How much time did you spend on " discharge?:[id number]}    Discharge Orders      General info for SNF    Length of Stay Estimate: Short Term Care: Estimated # of Days <30  Condition at Discharge: Stable  Level of care:skilled   Rehabilitation Potential: Good  Admission H&P remains valid and up-to-date: Yes  Recent Chemotherapy: N/A  Use Nursing Home Standing Orders: Yes     Mantoux instructions    Give two-step Mantoux (PPD) Per Facility Policy Yes     Wound care (specify)    Instructions:  Do not submerge the surgical site in water. You may cover the dressing for showers. Ideally, leave dressing intact until 2 week follow-up. May change the dressing if saturated > 60%.     Follow Up and recommended labs and tests    Follow-up with Dr. Junior ([facility]) at 2 weeks postoperatively for reevaluation, wound check/suture or staple removal, and possible x-rays. No need for labs or imaging prior to appointment.    Please call Dr. Junior's care coordinator, Lacy, at [phone] to schedule.    [facility] [place] After Hours Phone: [phone]    If urgent need with dressing/splint or operative site questions, the [facility] is available at multiple metro locations from 8-8 daily excluding holidays.     Weight bearing status    Weight bearing as tolerated RLE     Reason for your hospital stay    Status post intramedullary nailing for right intertrochanteric fracture (DOS: 4/21/2025)     General info for SNF    Length of Stay Estimate: Short Term Care: Estimated # of Days <30  Condition at Discharge: Improving  Level of care:skilled   Rehabilitation Potential: Excellent  Admission H&P remains valid and up-to-date: Yes  Recent Chemotherapy: N/A  Use Nursing Home Standing Orders: Yes     Mantoux instructions    Give two-step Mantoux (PPD) Per Facility Policy Yes     Follow Up and recommended labs and tests    Follow up with [facility] physician.  The following labs/tests are recommended: cbc in 4-5 days .     Reason  for your hospital stay    Fall, hip fracture     Daily weights    Call Provider for weight gain of more than 2 pounds per day or 5 pounds per week.     Activity - Up with nursing assistance     Physical Therapy Adult Consult    Evaluate and treat as clinically indicated.    Reason:  Status post intramedullary nailing for right intertrochanteric fracture (DOS: 4/21/2025)     Occupational Therapy Adult Consult    Evaluate and treat as clinically indicated.    Reason:  Status post intramedullary nailing for right intertrochanteric fracture (DOS: 4/21/2025)     Fall precautions     Hip precautions     Fall precautions     [facility] D[facility]    DME Documentation: Describe the reason for need to support medical necessity: Impaired gait status post hip surgery. I, the undersigned, certify that the above prescribed supplies are medically necessary for this patient and is both reasonable and necessary in reference to accepted standards of medical practice in the treatment of this patient's condition and is not prescribed as a convenience.     [facility]    DME Documentation: Describe the reason for need to support medical necessity: Impaired gait status post hip surgery. I, the undersigned, certify that the above prescribed supplies are medically necessary for this patient and is both reasonable and necessary in reference to accepted standards of medical practice in the treatment of this patient's condition and is not prescribed as a convenience.     Diet    Follow this diet upon discharge: Current Diet:Orders Placed This Encounter      Advance Diet as Tolerated: Regular Diet Adult     Discharge Medications   Current Discharge Medication List        START taking these medications    Details   acetaminophen (TYLENOL) 325 MG tablet Take 3 tablets (975 mg) by mouth every 8 hours as needed for mild pain.  Qty: 100 tablet, Refills: 0    Associated Diagnoses: Closed displaced intertrochanteric fracture of right femur, initial encounter (H)       enoxaparin ANTICOAGULANT (LOVENOX) 40 MG/0.4ML syringe Inject 0.4 mLs (40 mg) subcutaneously every 24 hours for 15 days.    Associated Diagnoses: Closed displaced intertrochanteric fracture of right femur, initial encounter (H)      oxyCODONE (ROXICODONE) 5 MG tablet Take 1 tablet (5 mg) by mouth every 6 hours as needed for moderate to severe pain.  Qty: 20 tablet, Refills: 0    Associated Diagnoses: Closed displaced intertrochanteric fracture of right femur, initial encounter (H)      senna-docusate (SENOKOT-S/PERICOLACE) 8.6-50 MG tablet Take 1 tablet by mouth 2 times daily as needed for constipation.  Qty: 30 tablet, Refills: 0    Associated Diagnoses: Closed displaced intertrochanteric fracture of right femur, initial encounter (H)           CONTINUE these medications which have NOT CHANGED    Details   amLODIPine (NORVASC) 5 MG tablet TAKE 1 TABLET BY MOUTH DAILY  Qty: 90 tablet, Refills: 1    Comments: Please send a replace/new response with 100-Day Supply if appropriate to maximize member benefit. Requesting 1 year supply.  Associated Diagnoses: Essential hypertension, benign      aspirin (ASA) 325 MG EC tablet Take 1 tablet (325 mg) by mouth daily  Qty: 100 tablet, Refills: 3    Associated Diagnoses: Lacunar infarction (H)      CENTRUM SILVER OR TABS 1 TABLET DAILY      furosemide (LASIX) 20 MG tablet TAKE 1 TABLET BY MOUTH DAILY AS  NEEDED FOR EDEMA  Qty: 100 tablet, Refills: 2    Comments: Please send a replace/new response with 100-Day Supply if appropriate to maximize member benefit. Requesting 1 year supply.  Associated Diagnoses: Essential hypertension, benign; Edema, unspecified type      latanoprost (XALATAN) 0.005 % ophthalmic solution Place 1 drop into both eyes every evening.      levothyroxine (SYNTHROID/LEVOTHROID) 137 MCG tablet Take 1 tablet (137 mcg) by mouth daily.  Qty: 100 tablet, Refills: 2    Associated Diagnoses: Hypothyroidism, unspecified type      timolol maleate (TIMOPTIC) 0.5  % ophthalmic solution Place 1 drop into both eyes 2 times daily      ketoconazole (NIZORAL) 2 % external cream Apply topically daily.  Qty: 30 g, Refills: 3    Associated Diagnoses: Tinea corporis           Allergies   Allergies   Allergen Reactions    Pravachol [Pravastatin] Muscle Pain (Myalgia)    Simvastatin      Data   Most Recent 3 CBC's:  Recent Labs   Lab Test 04/24/25  0743 04/23/25  0749 04/22/25  0912 04/21/25  0840   WBC 7.1  --  9.1 9.4   HGB 10.3* 10.8* 11.2* 13.9   MCV 97  --  98 96   *  --  124* 146*      Most Recent 3 BMP's:  Recent Labs   Lab Test 04/23/25  0749 04/22/25  0708 04/21/25  0840 04/20/25  2045 06/20/24  0906   NA  --   --  137 138 140   POTASSIUM 4.0 4.4 4.3 3.3* 4.2   CHLORIDE  --   --  102 101 103   CO2  --   --  25 27 27   BUN  --   --  13.0 16.5 14.0   CR  --   --  0.73 0.87 1.10   ANIONGAP  --   --  10 10 10   GHISLAINE  --   --  9.0 8.9 9.2   * 134* 130* 135* 107*     Most Recent 2 LFT's:  Recent Labs   Lab Test 12/19/24  1113 03/21/24  1053   AST 18 14   ALT 13 13   ALKPHOS 71 62   BILITOTAL 0.6 0.7     Most Recent INR's and Anticoagulation Dosing History:  Anticoagulation Dose History           No data to display              Most Recent 3 Troponin's:No lab results found.  Most Recent Cholesterol Panel:  Recent Labs   Lab Test 03/13/25  0836   CHOL 188   *   HDL 52   TRIG 128     Most Recent 6 Bacteria Isolates From Any Culture (See EPIC Reports for Culture Details):  Recent Labs   Lab Test 03/05/20  1435 02/05/20  1353 01/06/20  1317   CULT Light growth  Coagulase negative Staphylococcus  Susceptibility testing not routinely done  * 10,000 to 50,000 colonies/mL  Enterococcus faecalis  * No beta hemolytic Streptococcus Group A isolated     Most Recent TSH, T4 and A1c Labs:  Recent Labs   Lab Test 03/13/25  0836 01/27/25  0939 06/20/24  0906   TSH 5.73*   < >  --    T4 1.21   < >  --    A1C  --   --  5.5    < > = values in this interval not displayed.     Results  for orders placed or performed during the hospital encounter of 04/20/25   CT Head w/o Contrast    Narrative    EXAM: CT HEAD W/O CONTRAST  LOCATION: [facility]  DATE: 4/20/2025    INDICATION: fall with head injury  COMPARISON: MRI brain 6/6/2016, CT head 9/10/2016  TECHNIQUE: Routine CT Head without IV contrast. Multiplanar reformats. Dose reduction techniques were used.    FINDINGS:  INTRACRANIAL CONTENTS: No intracranial hemorrhage, extraaxial collection, or mass effect.  No CT evidence of acute infarct. Moderate to severe presumed chronic small vessel ischemic changes. Moderate generalized volume loss. No hydrocephalus. Left   thalamus small chronic lacunar infarct redemonstrated.    VISUALIZED ORBITS/SINUSES/MASTOIDS: No intraorbital abnormality. Mild mucosal thickening scattered about the paranasal sinuses. Right maxillary sinus small retention cyst/mucosal polyp. No middle ear or mastoid effusion.    BONES/SOFT TISSUES: No acute abnormality.      Impression    IMPRESSION:  1.  No acute intracranial process.    2.  Chronic intracranial changes described above.   XR Pelvis w Hip Right 1 View    Narrative    EXAM: XR PELVIS AND HIP RIGHT 1 VIEW  LOCATION: [facility]  DATE: 4/20/2025    INDICATION: Fall, hip pain and likely fracture.  COMPARISON: None.      Impression    IMPRESSION: Acute displaced and angulated right proximal femoral fracture with intertrochanteric involvement. The fracture extends towards the base of the femoral neck. Bones are demineralized and there is mild right hip arthrosis. Surgical clips in the   pelvis.    NOTE: ABNORMAL REPORT    THE DICTATION ABOVE DESCRIBES AN ABNORMALITY FOR WHICH FOLLOW-UP IS NEEDED.    XR Surgery EBONI Fluoro Less Than 5 Min w Stills    Narrative    This exam was marked as non-reportable because it will not be read by a   radiologist or a [place] non-radiologist provider.            faecalis  * No beta hemolytic Streptococcus Group A isolated     Most Recent TSH, T4 and A1c Labs:  Recent Labs   Lab Test 03/13/25  0836 01/27/25  0939 06/20/24  0906   TSH 5.73*   < >  --    T4 1.21   < >  --    A1C  --   --  5.5    < > = values in this interval not displayed.     Results for orders placed or performed during the hospital encounter of 04/20/25   CT Head w/o Contrast    Narrative    EXAM: CT HEAD W/O CONTRAST  LOCATION: [facility]  DATE: 4/20/2025    INDICATION: fall with head injury  COMPARISON: MRI brain 6/6/2016, CT head 9/10/2016  TECHNIQUE: Routine CT Head without IV contrast. Multiplanar reformats. Dose reduction techniques were used.    FINDINGS:  INTRACRANIAL CONTENTS: No intracranial hemorrhage, extraaxial collection, or mass effect.  No CT evidence of acute infarct. Moderate to severe presumed chronic small vessel ischemic changes. Moderate generalized volume loss. No hydrocephalus. Left   thalamus small chronic lacunar infarct redemonstrated.    VISUALIZED ORBITS/SINUSES/MASTOIDS: No intraorbital abnormality. Mild mucosal thickening scattered about the paranasal sinuses. Right maxillary sinus small retention cyst/mucosal polyp. No middle ear or mastoid effusion.    BONES/SOFT TISSUES: No acute abnormality.      Impression    IMPRESSION:  1.  No acute intracranial process.    2.  Chronic intracranial changes described above.   XR Pelvis w Hip Right 1 View    Narrative    EXAM: XR PELVIS AND HIP RIGHT 1 VIEW  LOCATION: [facility]  DATE: 4/20/2025    INDICATION: Fall, hip pain and likely fracture.  COMPARISON: None.      Impression    IMPRESSION: Acute displaced and angulated right proximal femoral fracture with intertrochanteric involvement. The fracture extends towards the base of the femoral neck. Bones are demineralized and there is mild right hip arthrosis. Surgical clips in the   pelvis.    NOTE: ABNORMAL REPORT    THE DICTATION  ABOVE DESCRIBES AN ABNORMALITY FOR WHICH FOLLOW-UP IS NEEDED.    XR Surgery EBONI Fluoro Less Than 5 Min w Stills    Narrative    This exam was marked as non-reportable because it will not be read by a   radiologist or a [place] non-radiologist provider.

## 2025-04-25 ENCOUNTER — TRANSITIONAL CARE UNIT VISIT (OUTPATIENT)
Dept: GERIATRICS | Facility: CLINIC | Age: OVER 89
End: 2025-04-25
Payer: COMMERCIAL

## 2025-04-25 ENCOUNTER — LAB REQUISITION (OUTPATIENT)
Dept: LAB | Facility: CLINIC | Age: OVER 89
End: 2025-04-25
Payer: COMMERCIAL

## 2025-04-25 DIAGNOSIS — D64.9 ANEMIA, UNSPECIFIED: ICD-10-CM

## 2025-04-25 PROBLEM — K59.04 CHRONIC IDIOPATHIC CONSTIPATION: Status: ACTIVE | Noted: 2025-04-25

## 2025-04-25 PROBLEM — D62 ANEMIA DUE TO BLOOD LOSS, ACUTE: Status: ACTIVE | Noted: 2025-04-25

## 2025-04-25 PROBLEM — R53.81 PHYSICAL DECONDITIONING: Status: ACTIVE | Noted: 2025-04-25

## 2025-04-25 PROBLEM — H40.003 GLAUCOMA SUSPECT, BILATERAL: Status: ACTIVE | Noted: 2025-04-25

## 2025-04-25 PROBLEM — S72.001D: Status: ACTIVE | Noted: 2025-04-20
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
## 2025-04-28 ENCOUNTER — TRANSITIONAL CARE UNIT VISIT (OUTPATIENT)
Dept: GERIATRICS | Facility: CLINIC | Age: OVER 89
End: 2025-04-28
Payer: COMMERCIAL

## 2025-04-28 VITALS
RESPIRATION RATE: 18 BRPM | OXYGEN SATURATION: 95 % | HEART RATE: 77 BPM | HEIGHT: 70 IN | WEIGHT: 199.8 LBS | TEMPERATURE: 98.1 F | DIASTOLIC BLOOD PRESSURE: 71 MMHG | BODY MASS INDEX: 28.6 KG/M2 | SYSTOLIC BLOOD PRESSURE: 136 MMHG

## 2025-04-28 DIAGNOSIS — K59.04 CHRONIC IDIOPATHIC CONSTIPATION: ICD-10-CM

## 2025-04-28 DIAGNOSIS — R53.81 PHYSICAL DECONDITIONING: ICD-10-CM

## 2025-04-28 DIAGNOSIS — H40.003 GLAUCOMA SUSPECT, BILATERAL: ICD-10-CM

## 2025-04-28 DIAGNOSIS — S72.001D CLOSED FRACTURE OF RIGHT HIP WITH ROUTINE HEALING, SUBSEQUENT ENCOUNTER: Primary | ICD-10-CM

## 2025-04-28 DIAGNOSIS — D62 ANEMIA DUE TO BLOOD LOSS, ACUTE: ICD-10-CM

## 2025-04-28 DIAGNOSIS — S72.001D CLOSED FRACTURE OF RIGHT HIP REQUIRING OPERATIVE REPAIR WITH ROUTINE HEALING, SUBSEQUENT ENCOUNTER: ICD-10-CM
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
## 2025-04-28 DIAGNOSIS — E03.9 HYPOTHYROIDISM, UNSPECIFIED TYPE: ICD-10-CM

## 2025-04-28 DIAGNOSIS — Z85.46 HISTORY OF PROSTATE CANCER: ICD-10-CM

## 2025-04-28 DIAGNOSIS — I63.81 LACUNAR INFARCTION (H): ICD-10-CM

## 2025-04-28 DIAGNOSIS — I10 ESSENTIAL HYPERTENSION, BENIGN: ICD-10-CM

## 2025-04-28 LAB — HGB BLD-MCNC: 9.7 G/DL (ref 13.3–17.7)

## 2025-04-28 PROCEDURE — 99309 SBSQ NF CARE MODERATE MDM 30: CPT | Performed by: NURSE PRACTITIONER

## 2025-04-28 PROCEDURE — 85018 HEMOGLOBIN: CPT | Mod: ORL | Performed by: INTERNAL MEDICINE

## 2025-04-28 PROCEDURE — 36415 COLL VENOUS BLD VENIPUNCTURE: CPT | Mod: ORL | Performed by: INTERNAL MEDICINE

## 2025-04-28 PROCEDURE — P9604 ONE-WAY ALLOW PRORATED TRIP: HCPCS | Mod: ORL | Performed by: INTERNAL MEDICINE

## 2025-04-28 NOTE — PROGRESS NOTES
[facility]  [facility] Record Number:  [id number]  Place of Service where encounter took place:  [facility] ([facility]) [[id number]]  Chief Complaint   Patient presents with    Nursing Home Acute       HPI:    Abundio Beckett  is a 89 year old (1935), who is being seen today for an episodic care visit.  HPI information obtained from: facility chart records, facility staff, patient report, and [facility] chart review. Today's concern is:     Closed fracture of right hip with routine healing, subsequent encounter  Closed fracture of right hip requiring operative repair with routine healing, subsequent encounter  Fall, sequela  Physical deconditioning  Anemia due to blood loss, acute  Essential hypertension, benign  Lacunar infarction (H)  Hypothyroidism, unspecified type  Chronic idiopathic constipation  Glaucoma suspect, bilateral  History of prostate cancer   -pt says much better pain control  --sleeping well also  --bowels better  --feels stronger    Past Medical and Surgical History reviewed in Epic today.    MEDICATIONS:     Current Outpatient Medications   Medication Sig Dispense Refill    acetaminophen (TYLENOL) 325 MG tablet Take 975 mg by mouth 3 times daily. Give at ~~0700    1400   2200      amLODIPine (NORVASC) 5 MG tablet TAKE 1 TABLET BY MOUTH DAILY 90 tablet 1    aspirin (ASA) 325 MG EC tablet Take 1 tablet (325 mg) by mouth daily 100 tablet 3    CENTRUM SILVER OR TABS 1 TABLET DAILY      enoxaparin ANTICOAGULANT (LOVENOX) 40 MG/0.4ML syringe Inject 0.4 mLs (40 mg) subcutaneously every 24 hours for 15 days.      furosemide (LASIX) 20 MG tablet TAKE 1 TABLET BY MOUTH DAILY AS  NEEDED FOR EDEMA (Patient not taking: Reported on 4/25/2025) 100 tablet 2    ketoconazole (NIZORAL) 2 % external cream Apply topically daily. 30 g 3    latanoprost (XALATAN) 0.005 % ophthalmic solution Place 1 drop into both eyes every evening.      levothyroxine  "(SYNTHROID/LEVOTHROID) 137 MCG tablet Take 1 tablet (137 mcg) by mouth daily. 100 tablet 2    oxyCODONE (ROXICODONE) 5 MG tablet Take 1 tablet (5 mg) by mouth every 6 hours as needed for severe pain. TAKE 2.5MG Q 6HR PRN PAIN 1-5/10   OR 5MG PO Q 6 HR PRN 6-10/10 PAIN 20 tablet 0    oxyCODONE (ROXICODONE) 5 MG tablet Take 0.5 tablets (2.5 mg) by mouth every morning. With am tylenol scheduled 5 tablet 0    polyethylene glycol (MIRALAX) 17 g packet Take 1 packet by mouth daily.      senna-docusate (SENOKOT-S/PERICOLACE) 8.6-50 MG tablet Take 1 tablet by mouth daily. Hold for loose bm      senna-docusate (SENOKOT-S/PERICOLACE) 8.6-50 MG tablet Take 1 tablet by mouth 2 times daily as needed for constipation. 30 tablet 0    sodium phosphate (FLEET ENEMA) 7-19 GM/118ML rectal enema Place 1 enema rectally every 48 hours as needed for constipation.      timolol maleate (TIMOPTIC) 0.5 % ophthalmic solution Place 1 drop into both eyes 2 times daily            REVIEW OF SYSTEMS:  10 point ROS of systems including Constitutional, Eyes, Respiratory, Cardiovascular, Gastroenterology, Genitourinary, Integumentary, Musculoskeletal, Psychiatric were all negative except for pertinent positives noted in my HPI.    Objective:  /71   Pulse 77   Temp 98.1  F (36.7  C)   Resp 18   Ht 1.778 m (5' 10\")   Wt 90.6 kg (199 lb 12.8 oz)   SpO2 95%   BMI 28.67 kg/m    Exam:  GENERAL APPEARANCE:  Alert, in no distress, appears healthy, oriented, cooperative  ENT:  Mouth with  moist mucous membranes, normal hearing acuity  EYES:  Conjunctivae, lids, pupils and irises normal  RESP:  respiratory effort   of chest normal, lungs CTA , no respiratory distress  CV:  Auscultation of heart done ,RRR, no murmur,  <+1 right LE edema, +2 pedal pulses  ABDOMEN:  normal bowel sounds, soft, nontender  M/S:   LORENZO and moving right leg more easily  SKIN:  Inspection of skin at baseline, except two Aquagel dressings right thigh dry and intact  NEURO:   " Cranial nerves are  grossly intact and at patient's baseline  PSYCH:  oriented X 3, normal insight, judgement and memory, affect and mood normal      Labs:   Hemoglobin   Date Value Ref Range Status   04/28/2025 9.7 (L) 13.3 - 17.7 g/dL Final   04/24/2025 10.3 (L) 13.3 - 17.7 g/dL Final   12/04/2020 14.4 13.3 - 17.7 g/dL Final   10/09/2019 14.8 13.3 - 17.7 g/dL Final     Recent Labs   Lab Test 04/24/25  0743 04/23/25  0749 04/22/25  0708 04/21/25  0840 04/20/25 2045   NA  --   --   --  137 138   POTASSIUM 4.1 4.0 4.4 4.3 3.3*   CHLORIDE  --   --   --  102 101   CO2  --   --   --  25 27   ANIONGAP  --   --   --  10 10   GLC  --  135* 134* 130* 135*   BUN  --   --   --  13.0 16.5   CR 0.72  --   --  0.73 0.87   GHISLAINE  --   --   --  9.0 8.9     ASSESSMENT / PLAN:  (S72.001D) Closed fracture of right hip with routine healing, subsequent encounter  (primary encounter diagnosis)  (W19.XXXS) Fall, sequela  (R53.81) Physical deconditioning  Comment: cont scheduled am Tylenol and oxy, also  prn oxy. F/u ortho, [facility] will call for appt in ~~ 2 weeks--don't have yet. Cont PT OT    (D62) Anemia due to blood loss, acute  Comment:   Hgb  down a bit, will recheck in 1-2 weeks or prn.     (I10) Essential hypertension, benign  (I63.81) Lacunar infarction (H)  Comment: SBP runs 130-140-- cont amlodipine,  monitor wts.    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       Total time for the visit was 40 minutes including, but not limited to, non-face-to-face time spent reviewing records, counseling, and coordination of care.           Electronically signed by:  CONSTANTINE Gonzalez CNP

## 2025-05-01 ENCOUNTER — TRANSITIONAL CARE UNIT VISIT (OUTPATIENT)
Dept: GERIATRICS | Facility: CLINIC | Age: OVER 89
End: 2025-05-01
Payer: COMMERCIAL

## 2025-05-01 VITALS
WEIGHT: 198.8 LBS | HEIGHT: 70 IN | HEART RATE: 82 BPM | RESPIRATION RATE: 18 BRPM | BODY MASS INDEX: 28.46 KG/M2 | OXYGEN SATURATION: 95 % | DIASTOLIC BLOOD PRESSURE: 64 MMHG | SYSTOLIC BLOOD PRESSURE: 111 MMHG | TEMPERATURE: 98.1 F

## 2025-05-01 DIAGNOSIS — Z85.46 HISTORY OF PROSTATE CANCER: ICD-10-CM

## 2025-05-01 DIAGNOSIS — E03.9 HYPOTHYROIDISM, UNSPECIFIED TYPE: ICD-10-CM

## 2025-05-01 DIAGNOSIS — R35.1 NOCTURIA: ICD-10-CM

## 2025-05-01 DIAGNOSIS — D62 ANEMIA DUE TO BLOOD LOSS, ACUTE: ICD-10-CM
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
## 2025-05-01 DIAGNOSIS — S72.001D CLOSED FRACTURE OF RIGHT HIP WITH ROUTINE HEALING, SUBSEQUENT ENCOUNTER: ICD-10-CM

## 2025-05-01 DIAGNOSIS — S72.001D CLOSED FRACTURE OF RIGHT HIP REQUIRING OPERATIVE REPAIR WITH ROUTINE HEALING, SUBSEQUENT ENCOUNTER: Primary | ICD-10-CM

## 2025-05-01 DIAGNOSIS — D69.6 THROMBOCYTOPENIA: ICD-10-CM

## 2025-05-01 DIAGNOSIS — R53.81 PHYSICAL DECONDITIONING: ICD-10-CM

## 2025-05-01 DIAGNOSIS — R41.89 COGNITIVE IMPAIRMENT: ICD-10-CM

## 2025-05-01 DIAGNOSIS — I10 ESSENTIAL HYPERTENSION, BENIGN: ICD-10-CM

## 2025-05-01 DIAGNOSIS — I63.81 LACUNAR INFARCTION (H): ICD-10-CM

## 2025-05-01 RX ORDER — OXYCODONE HYDROCHLORIDE 5 MG/1
2.5 TABLET ORAL EVERY MORNING
Qty: 15 TABLET | Refills: 0 | Status: SHIPPED | OUTPATIENT
Start: 2025-05-01

## 2025-05-01 NOTE — PROGRESS NOTES
"Abundio Beckett is a 89 year old male seen May 1, 2025 at [facility] where he was admitted after [facility] hospitalization - following a fall at home in which she suffered a right proximal femur fracture and underwent IM nailing   Post operative course notable for mild anemia and thrombocytopenia, but he otherwise did well and has transferred to TCU for ongoing recovery and Rehab.         Today pt is seen in his room up to chair    Asking about low BPs - was 104/57 this morning.   Doesn't report any symptoms, but thinks his amlodipine should be stopped      Hip is hurting quite a bid, doesn't know if pain meds are helping  \"Can't really say.\"   When reviewed with nurse, pt is not asking for his PRN oxycodone     Past Medical History:   Diagnosis Date    Basal cell carcinoma     BENIGN PROSTATIC HYPERTROPHY 2002    Essential hypertension, benign 2016    Hyperlipidemia LDL goal <100 2012    Hypertrophy (benign) of prostate     abstracted 02.    HYPOTHYROIDISM NOS 2002    Lacunar infarction (H) 2012    Unspecified hypothyroidism     abstracted 02.       Past Surgical History:   Procedure Laterality Date    HC REPAIR OF NASAL SEPTUM      deviated septum surgery    HC SHOULDER ARTHROSCOPY, DX      OPEN REDUCTION INTERNAL FIXATION RODDING INTRAMEDULLAR FEMUR FRACTURE TABLE Right 2025    Procedure: Right hip/femur cephalomedullary nail;  Surgeon: Richy Junior MD;  Location:  OR    [facility] REMV PROSTATE,PERINEAL,RADICAL  2000     Social History     Tobacco Use    Smoking status: Never    Smokeless tobacco: Never   Substance Use Topics    Alcohol use: Yes     Comment: Occasional wine or Margarita.      SH: Pt lives independently, [place] apartment in [place]   Significant other Kori   Daughters Shantell and Nicol   Son-in-law's  is this month.     Review Of Systems  Skin: negative   Eyes: impaired vision, glasses     Ears/Nose/Throat: hearing " "loss  Respiratory: No shortness of breath, dyspnea on exertion, cough, or hemoptysis  Cardiovascular: negative  Gastrointestinal: constipation  Genitourinary: nocturia q2 hours   Musculoskeletal: ambulatory with cane prior to his fall   Min to mod assist with dressing and hygiene; mod assist for bed mobility and transfers   Ambulates 30' with FWW and min assist     Neurologic: cognitive decline, per family   Psychiatric: depression  Hematologic/Lymphatic/Immunologic: anemia and thrombocytopenia     Endocrine: hypothyroidism       GENERAL APPEARANCE: alert and no distress  /64   Pulse 82   Temp 98.1  F (36.7  C)   Resp 18   Ht 1.778 m (5' 10\")   Wt 90.2 kg (198 lb 12.8 oz)   SpO2 95%   BMI 28.52 kg/m     HEENT: normocephalic, no lesion or abnormalities  NECK: no adenopathy, no asymmetry, masses, or scars and thyroid normal to palpation  RESP: lungs clear to auscultation - no rales, rhonchi or wheezes  CV: regular rate and rhythm, normal S1 S2  ABDOMEN:  soft, nontender, no HSM or masses and bowel sounds normal  MS: extremities normal- trace LE edema     SKIN: no suspicious lesions or rashes  NEURO: Normal strength and tone, sensory exam grossly normal, and speech normal, +wordfinding difficulty and some STML  PSYCH: affect pleasant, tears up at time    LYMPHATICS: No cervical,  or supraclavicular nodes     Lab Results   Component Value Date     04/21/2025    POTASSIUM 4.1 04/24/2025    CHLORIDE 102 04/21/2025    CO2 25 04/21/2025    ANIONGAP 10 04/21/2025     (H) 04/23/2025    BUN 13.0 04/21/2025    CR 0.72 04/24/2025    GFRESTIMATED 87 04/24/2025    GHISLAINE 9.0 04/21/2025     Lab Results   Component Value Date    WBC 7.1 04/24/2025      HGB 9.7 04/28/2025      MCV 97 04/24/2025       04/24/2025      XR PELVIS AND HIP RIGHT 1 VIEW    4/20/2025                                                          IMPRESSION: Acute displaced and angulated right proximal femoral fracture with " intertrochanteric involvement. The fracture extends towards the base of the femoral neck. Bones are demineralized and there is mild right hip arthrosis. Surgical clips in the pelvis.    CT HEAD W/O CONTRAST  4/20/2025  INTRACRANIAL CONTENTS: No intracranial hemorrhage, extraaxial collection, or mass effect.  No CT evidence of acute infarct. Moderate to severe presumed chronic small vessel ischemic changes. Moderate generalized volume loss. No hydrocephalus. Left thalamus small chronic lacunar infarct redemonstrated.  VISUALIZED ORBITS/SINUSES/MASTOIDS: No intraorbital abnormality. Mild mucosal thickening scattered about the paranasal sinuses. Right maxillary sinus small retention cyst/mucosal polyp. No middle ear or mastoid effusion.                                                              IMPRESSION:  1.  No acute intracranial process.  2.  Chronic intracranial changes described above.      IMP/PLAN:  (S72.001D) Closed fracture of right hip requiring operative repair with routine healing, subsequent encounter  (primary encounter diagnosis)  Comment: s/p IM nailing   Reporting limiting pain today     Plan: monitor incision, follow up with orthopedics     Pain management with scheduled acetaminophen 975 mg q8 hours   Change oxycodone to 2.5 mg bid before therapies, and continue PRN    With bowel regimen    Enoxaparin 40 mg daily for DVT prophylaxis        (W19.XXXS) Fall, sequela  (R53.81) Physical deconditioning  Comment: mechanical fall involving a trip   Plan: PHYSICAL THERAPY /OCCUPATIONAL THERAPY for transfers, balance, gait, strengthening and ADLs       (D62) Anemia due to blood loss, acute  Comment: mild  Plan: follow hgb, if worsens consider holding ASA     (I10) Essential hypertension, benign  Comment:   BP Readings from Last 3 Encounters:   05/01/25 111/64   04/28/25 136/71   04/25/25 (!) 180/89      Plan: hold amlodipine  Follow BPs      (I63.81) Lacunar infarction (H)  Comment: in 2012, seen on recent  head imaging     Plan: remains on  mg/day     (R35.1) Nocturia  (Z85.46) History of prostate cancer  Comment: q2 hours during the night, not voiding much during the day   Beltran catheter removed at hospital discharge      Plan: Start tamsulosin 0.4 mg/ HS      (E03.9) Hypothyroidism, unspecified type  Comment:   TSH   Date Value Ref Range Status   03/13/2025 5.73 (H) 0.30 - 4.20 uIU/mL Final   10/19/2022 3.37 0.40 - 4.00 mU/L Final   08/17/2020 3.93 0.40 - 4.00 mU/L Final      Plan: on his usual dose of 137 mcg/day   Pt is wondering about changing his dose, but since TSH just minimally elevated will defer to PCP as outpatient   Pt agreeable       (D69.6) Thrombocytopenia  Comment: plts 115-146    Plan: monitor for any bleeding complications   Follow CBC     (R41.89) Cognitive impairment  Comment: noted on history, and reported by family   Plan: formal cognitive testing per Occupational Therapy to help determine safe discharge plan        Mildred Price MD

## 2025-05-01 NOTE — LETTER
" 2025      Abundio Beckett  [address]  [facility] [place] [facility] [id number]        Abundio Beckett is a 89 year old male seen May 1, 2025 at [facility] where he was admitted after [facility] hospitalization - following a fall at home in which she suffered a right proximal femur fracture and underwent IM nailing   Post operative course notable for mild anemia and thrombocytopenia, but he otherwise did well and has transferred to TCU for ongoing recovery and Rehab.         Today pt is seen in his room up to chair    Asking about low BPs - was 104/57 this morning.   Doesn't report any symptoms, but thinks his amlodipine should be stopped      Hip is hurting quite a bid, doesn't know if pain meds are helping  \"Can't really say.\"   When reviewed with nurse, pt is not asking for his PRN oxycodone     Past Medical History:   Diagnosis Date     Basal cell carcinoma      BENIGN PROSTATIC HYPERTROPHY 2002     Essential hypertension, benign 2016     Hyperlipidemia LDL goal <100 2012     Hypertrophy (benign) of prostate     abstracted 02.     HYPOTHYROIDISM NOS 2002     Lacunar infarction (H) 2012     Unspecified hypothyroidism     abstracted 02.       Past Surgical History:   Procedure Laterality Date     HC REPAIR OF NASAL SEPTUM      deviated septum surgery     HC SHOULDER ARTHROSCOPY, DX       OPEN REDUCTION INTERNAL FIXATION RODDING INTRAMEDULLAR FEMUR FRACTURE TABLE Right 2025    Procedure: Right hip/femur cephalomedullary nail;  Surgeon: Richy Junior MD;  Location:  OR     [facility] REMV PROSTATE,PERINEAL,RADICAL  2000     Social History     Tobacco Use     Smoking status: Never     Smokeless tobacco: Never   Substance Use Topics     Alcohol use: Yes     Comment: Occasional wine or Margarita.      SH: Pt lives independently, [place] apartment in [place]   Significant other Kori   Daughters Shantell and Nicol   Son-in-law's  is this month. " "    Review Of Systems  Skin: negative   Eyes: impaired vision, glasses     Ears/Nose/Throat: hearing loss  Respiratory: No shortness of breath, dyspnea on exertion, cough, or hemoptysis  Cardiovascular: negative  Gastrointestinal: constipation  Genitourinary: nocturia q2 hours   Musculoskeletal: ambulatory with cane prior to his fall   Min to mod assist with dressing and hygiene; mod assist for bed mobility and transfers   Ambulates 30' with FWW and min assist     Neurologic: cognitive decline, per family   Psychiatric: depression  Hematologic/Lymphatic/Immunologic: anemia and thrombocytopenia     Endocrine: hypothyroidism       GENERAL APPEARANCE: alert and no distress  /64   Pulse 82   Temp 98.1  F (36.7  C)   Resp 18   Ht 1.778 m (5' 10\")   Wt 90.2 kg (198 lb 12.8 oz)   SpO2 95%   BMI 28.52 kg/m     HEENT: normocephalic, no lesion or abnormalities  NECK: no adenopathy, no asymmetry, masses, or scars and thyroid normal to palpation  RESP: lungs clear to auscultation - no rales, rhonchi or wheezes  CV: regular rate and rhythm, normal S1 S2  ABDOMEN:  soft, nontender, no HSM or masses and bowel sounds normal  MS: extremities normal- trace LE edema     SKIN: no suspicious lesions or rashes  NEURO: Normal strength and tone, sensory exam grossly normal, and speech normal, +wordfinding difficulty and some STML  PSYCH: affect pleasant, tears up at time    LYMPHATICS: No cervical,  or supraclavicular nodes     Lab Results   Component Value Date     04/21/2025    POTASSIUM 4.1 04/24/2025    CHLORIDE 102 04/21/2025    CO2 25 04/21/2025    ANIONGAP 10 04/21/2025     (H) 04/23/2025    BUN 13.0 04/21/2025    CR 0.72 04/24/2025    GFRESTIMATED 87 04/24/2025    GHISLAINE 9.0 04/21/2025     Lab Results   Component Value Date    WBC 7.1 04/24/2025      HGB 9.7 04/28/2025      MCV 97 04/24/2025       04/24/2025      XR PELVIS AND HIP RIGHT 1 VIEW    4/20/2025                                                "           IMPRESSION: Acute displaced and angulated right proximal femoral fracture with intertrochanteric involvement. The fracture extends towards the base of the femoral neck. Bones are demineralized and there is mild right hip arthrosis. Surgical clips in the pelvis.    CT HEAD W/O CONTRAST  4/20/2025  INTRACRANIAL CONTENTS: No intracranial hemorrhage, extraaxial collection, or mass effect.  No CT evidence of acute infarct. Moderate to severe presumed chronic small vessel ischemic changes. Moderate generalized volume loss. No hydrocephalus. Left thalamus small chronic lacunar infarct redemonstrated.  VISUALIZED ORBITS/SINUSES/MASTOIDS: No intraorbital abnormality. Mild mucosal thickening scattered about the paranasal sinuses. Right maxillary sinus small retention cyst/mucosal polyp. No middle ear or mastoid effusion.                                                              IMPRESSION:  1.  No acute intracranial process.  2.  Chronic intracranial changes described above.      IMP/PLAN:  (S72.001D) Closed fracture of right hip requiring operative repair with routine healing, subsequent encounter  (primary encounter diagnosis)  Comment: s/p IM nailing   Reporting limiting pain today     Plan: monitor incision, follow up with orthopedics     Pain management with scheduled acetaminophen 975 mg q8 hours   Change oxycodone to 2.5 mg bid before therapies, and continue PRN    With bowel regimen    Enoxaparin 40 mg daily for DVT prophylaxis        (W19.XXXS) Fall, sequela  (R53.81) Physical deconditioning  Comment: mechanical fall involving a trip   Plan: PHYSICAL THERAPY /OCCUPATIONAL THERAPY for transfers, balance, gait, strengthening and ADLs       (D62) Anemia due to blood loss, acute  Comment: mild  Plan: follow hgb, if worsens consider holding ASA     (I10) Essential hypertension, benign  Comment:   BP Readings from Last 3 Encounters:   05/01/25 111/64   04/28/25 136/71   04/25/25 (!) 180/89      Plan: hold  amlodipine  Follow BPs      (I63.81) Lacunar infarction (H)  Comment: in 2012, seen on recent head imaging     Plan: remains on  mg/day     (R35.1) Nocturia  (Z85.46) History of prostate cancer  Comment: q2 hours during the night, not voiding much during the day   Beltran catheter removed at hospital discharge      Plan: Start tamsulosin 0.4 mg/ HS      (E03.9) Hypothyroidism, unspecified type  Comment:   TSH   Date Value Ref Range Status   03/13/2025 5.73 (H) 0.30 - 4.20 uIU/mL Final   10/19/2022 3.37 0.40 - 4.00 mU/L Final   08/17/2020 3.93 0.40 - 4.00 mU/L Final      Plan: on his usual dose of 137 mcg/day   Pt is wondering about changing his dose, but since TSH just minimally elevated will defer to PCP as outpatient   Pt agreeable       (D69.6) Thrombocytopenia  Comment: plts 115-146    Plan: monitor for any bleeding complications   Follow CBC     (R41.89) Cognitive impairment  Comment: noted on history, and reported by family   Plan: formal cognitive testing per Occupational Therapy to help determine safe discharge plan        Mildred Price MD       Sincerely,        Mildred Price MD    Electronically signed

## 2025-05-05 ENCOUNTER — TRANSITIONAL CARE UNIT VISIT (OUTPATIENT)
Dept: GERIATRICS | Facility: CLINIC | Age: OVER 89
End: 2025-05-05
Payer: COMMERCIAL

## 2025-05-05 VITALS
OXYGEN SATURATION: 96 % | WEIGHT: 203 LBS | DIASTOLIC BLOOD PRESSURE: 63 MMHG | HEIGHT: 70 IN | HEART RATE: 67 BPM | RESPIRATION RATE: 16 BRPM | BODY MASS INDEX: 29.06 KG/M2 | TEMPERATURE: 97.9 F | SYSTOLIC BLOOD PRESSURE: 120 MMHG

## 2025-05-05 DIAGNOSIS — K59.04 CHRONIC IDIOPATHIC CONSTIPATION: ICD-10-CM

## 2025-05-05 DIAGNOSIS — R53.81 PHYSICAL DECONDITIONING: ICD-10-CM

## 2025-05-05 DIAGNOSIS — I63.81 LACUNAR INFARCTION (H): ICD-10-CM

## 2025-05-05 DIAGNOSIS — S72.001D CLOSED FRACTURE OF RIGHT HIP REQUIRING OPERATIVE REPAIR WITH ROUTINE HEALING, SUBSEQUENT ENCOUNTER: ICD-10-CM

## 2025-05-05 DIAGNOSIS — H40.003 GLAUCOMA SUSPECT, BILATERAL: ICD-10-CM

## 2025-05-05 DIAGNOSIS — D62 ANEMIA DUE TO BLOOD LOSS, ACUTE: ICD-10-CM
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
## 2025-05-05 DIAGNOSIS — S72.001D CLOSED FRACTURE OF RIGHT HIP WITH ROUTINE HEALING, SUBSEQUENT ENCOUNTER: ICD-10-CM

## 2025-05-05 DIAGNOSIS — L76.32 POSTOPERATIVE HEMATOMA OF SUBCUTANEOUS TISSUE FOLLOWING NON-DERMATOLOGIC PROCEDURE: Primary | ICD-10-CM

## 2025-05-05 DIAGNOSIS — I10 ESSENTIAL HYPERTENSION, BENIGN: ICD-10-CM

## 2025-05-05 DIAGNOSIS — E03.9 HYPOTHYROIDISM, UNSPECIFIED TYPE: ICD-10-CM

## 2025-05-05 PROCEDURE — 99310 SBSQ NF CARE HIGH MDM 45: CPT | Performed by: NURSE PRACTITIONER

## 2025-05-05 NOTE — PROGRESS NOTES
[facility]  [facility] Medical Record Number:  [id number]  Place of Service where encounter took place:  [facility] ([facility]) [[id number]]  Chief Complaint   Patient presents with    Nursing Home Acute and Preop for hematoma evacuation in am 5/6/25       HPI:    Abundio Beckett  is a 89 year old (1935), who is being seen today for an episodic care visit.  HPI information obtained from: facility chart records, facility staff, patient report, and [facility] chart review. Today's concern is:     Postoperative hematoma of subcutaneous tissue following non-dermatologic procedure  Closed fracture of right hip with routine healing, subsequent encounter  Closed fracture of right hip requiring operative repair with routine healing, subsequent encounter  Fall, sequela  Physical deconditioning  Anemia due to blood loss, acute  Essential hypertension, benign  Lacunar infarction (H)  Hypothyroidism, unspecified type  Chronic idiopathic constipation  Glaucoma suspect, bilateral     -Went to ortho, has hematoma at incisional site that will need to be evaluated and an I&D  tomorrow  -pt says  pain control variable but does not always want to oxycodone  --bowels better but worried about constipation so wanted adjustment to regimen--see below  --feels stronger overall    Past Medical and Surgical History reviewed in Epic today.    MEDICATIONS:     Current Outpatient Medications   Medication Sig Dispense Refill    oxyCODONE (ROXICODONE) 5 MG tablet Take 0.5 tablets (2.5 mg) by mouth every morning. With am tylenol scheduled (Patient taking differently: Take 2.5 mg by mouth 2 times daily. 5/1/25: changed to bid with tylenol scheduled) 15 tablet 0    senna-docusate (SENOKOT-S/PERICOLACE) 8.6-50 MG tablet Take 1 tablet by mouth 2 times daily as needed for constipation. (Patient taking differently: Take 1 tablet by mouth daily as needed for constipation. 5/5/25: changed to 1 daily prn) 30  tablet 0    acetaminophen (TYLENOL) 325 MG tablet Take 975 mg by mouth 3 times daily. Give at ~~0700    1400   2200      amLODIPine (NORVASC) 5 MG tablet TAKE 1 TABLET BY MOUTH DAILY 90 tablet 1    aspirin (ASA) 325 MG EC tablet Take 1 tablet (325 mg) by mouth daily (Patient not taking: Reported on 5/5/2025) 100 tablet 3    CENTRUM SILVER OR TABS 1 TABLET DAILY      enoxaparin ANTICOAGULANT (LOVENOX) 40 MG/0.4ML syringe Inject 0.4 mLs (40 mg) subcutaneously every 24 hours for 15 days. (Patient not taking: Reported on 5/5/2025)      furosemide (LASIX) 20 MG tablet TAKE 1 TABLET BY MOUTH DAILY AS  NEEDED FOR EDEMA (Patient not taking: Reported on 4/25/2025) 100 tablet 2    ketoconazole (NIZORAL) 2 % external cream Apply topically daily. 30 g 3    latanoprost (XALATAN) 0.005 % ophthalmic solution Place 1 drop into both eyes every evening.      levothyroxine (SYNTHROID/LEVOTHROID) 137 MCG tablet Take 1 tablet (137 mcg) by mouth daily. 100 tablet 2    oxyCODONE (ROXICODONE) 5 MG tablet Take 1 tablet (5 mg) by mouth every 6 hours as needed for severe pain. TAKE 2.5MG Q 6HR PRN PAIN 1-5/10   OR 5MG PO Q 6 HR PRN 6-10/10 PAIN 20 tablet 0    polyethylene glycol (MIRALAX) 17 g packet Take 1 packet by mouth daily. 5/5/2025:  and 17gm po in a glass of fluid daily prn      senna-docusate (SENOKOT-S/PERICOLACE) 8.6-50 MG tablet Take 1 tablet by mouth daily. Hold for loose bm      sodium phosphate (FLEET ENEMA) 7-19 GM/118ML rectal enema Place 1 enema rectally every 48 hours as needed for constipation.      timolol maleate (TIMOPTIC) 0.5 % ophthalmic solution Place 1 drop into both eyes 2 times daily            REVIEW OF SYSTEMS:  10 point ROS of systems including Constitutional, Eyes, Respiratory, Cardiovascular, Gastroenterology, Genitourinary, Integumentary, Musculoskeletal, Psychiatric were all negative except for pertinent positives noted in my HPI.    Objective:  /63   Pulse 67   Temp 97.9  F (36.6  C)   Resp 16    "Ht 1.778 m (5' 10\")   Wt 92.1 kg (203 lb)   SpO2 96%   BMI 29.13 kg/m    Exam:  GENERAL APPEARANCE:  Alert, in no distress, appears healthy, oriented, cooperative  ENT:  Mouth with  moist mucous membranes, normal hearing acuity  EYES:  Conjunctivae, lids, pupils and irises normal  RESP:  respiratory effort   of chest normal, lungs  clear,  NAD  CV:  Auscultation of heart done ,RRR, no murmur,  +1-2 right LE  and trace on left LE edema, +2 pedal pulses  ABDOMEN:  normal bowel sounds, soft, nontender  M/S:   LORENZO and moving right leg more easily--up with assist  SKIN:  Inspection of skin at baseline, post ortho visit: staples out, slight bloody ooze top of wound, bruising noted, hematoma felt also  NEURO:   Cranial nerves are  grossly intact and at patient's baseline  PSYCH:  oriented X 3, normal insight, judgement and memory, affect and mood normal      Labs:   Hemoglobin   Date Value Ref Range Status   04/28/2025 9.7 (L) 13.3 - 17.7 g/dL Final   04/24/2025 10.3 (L) 13.3 - 17.7 g/dL Final   12/04/2020 14.4 13.3 - 17.7 g/dL Final   10/09/2019 14.8 13.3 - 17.7 g/dL Final     Recent Labs   Lab Test 04/24/25  0743 04/23/25  0749 04/22/25  0708 04/21/25  0840 04/20/25  2045   NA  --   --   --  137 138   POTASSIUM 4.1 4.0 4.4 4.3 3.3*   CHLORIDE  --   --   --  102 101   CO2  --   --   --  25 27   ANIONGAP  --   --   --  10 10   GLC  --  135* 134* 130* 135*   BUN  --   --   --  13.0 16.5   CR 0.72  --   --  0.73 0.87   GHISLAINE  --   --   --  9.0 8.9      ASSESSMENT PLAN:  (L76.32) Postoperative hematoma of subcutaneous tissue following non-dermatologic procedure(primary encounter diagnosis)  Comment: Ortho ordered and an US to r/o DVT will be done today, at [facility]. He  needs an evacuation in the am and will go to [facility] for that.   Check Hgb post op also. Resume the Lovenox and asa per Ortho orders, after procedure and US results.     (S72.001D) Closed fracture of right hip with routine healing, subsequent encounter  " "(primary encounter diagnosis)  (W19.XXXS) Fall, sequela  (R53.81) Physical deconditioning  Comment: cont scheduled am Tylenol and bid oxy-though he declines some of the scheduled oxy.  Also has prn oxy.   Cont PT OT    (D62) Anemia due to blood loss, acute  Comment:   see above,  recheck after hematoma evac.    (I10) Essential hypertension, benign  (I63.81) Lacunar infarction (H)  Comment: SBP runs mostly [id number]-- cont amlodipine,  monitor wts.    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      Proposed Surgery/ Procedure: I&D of right hip hematoma  Date of Surgery/ Procedure: 05/06/2025  Time of Surgery/ Procedure: 0600  Hospital/Surgical Facility[facility]    Primary Physician: lise jimenes MD [facility]  Type of Anesthesia Anticipated: to be determined    Patient has a Health Care Directive or Living Will:  YES FULL CODE    {PREOP QUESTIONNAIRE OPTIONS(by MA):639216::    1.YES   - Do you have a history of heart attack, stroke, stent, bypass or surgery on an artery in the head, neck, heart or legs?\"  2. NO Do you ever have any pain or discomfort in your chest?\",\"  3. NO Do you have a history of  Heart Failure?\",  4. NOAre you troubled by shortness of breath when: walking on the level, up a slight hill or at night?\",  5. NO Do you currently have a cold, bronchitis or other respiratory infection?\",  6. NODo you have a cough, shortness of breath or wheezing?\",  7. NODo you sometimes get pains in the calves of your legs when you walk?\",  8. NODo you or anyone in your family have previous history of blood clots?\",  9. NODo you or does anyone in your family have a serious bleeding problem such as prolonged bleeding following surgeries or cuts?\",  10.YES " "anemia right now. Have you ever had problems with anemia or been told to take iron pills?\",  11. NO Have you had any abnormal blood loss such as black, tarry or bloody stools, or abnormal vaginal bleeding?\",  12. YES ? Have you ever had a blood transfusion?\",  13. YES ?Have you or any of your relatives ever had problems with anesthesia?\",  14. NODo you have sleep apnea, excessive snoring or daytime drowsiness?\",  15. NODo you have any prosthetic heart valves?\",  16. NO- Do you have prosthetic joints?    Approval given to proceed with proposed procedure, without further diagnostic evaluation.   Discontinue  NSAIDS,  DOAC,  Warfarin, ASA  today prior to procedure per Surgery Orders in am.      Diagnostics:  Right leg US pending for dvt. Is  being done at [facility] this afternoon in TCU.    Revised Cardiac Risk Index (RCRI):  The patient has the following serious cardiovascular risks for perioperative complications:   - Cerebrovascular Disease (TIA or CVA) = 1 point     RCRI Interpretation: 1 point: Class II (low risk - 0.9% complication rate) this is a local hematoma evacuation so low risk      Resume all medications post-operative or per surgeon. Surgeon to decide on anticoagulations        Total time for the visit was 50 minutes including, but not limited to, non-face-to-face time spent reviewing records, counseling, and coordination of care.   I UPDATED daughter who was present after return from appt also         Electronically signed by:  CONSTANTINE Gonzalez CNP             "

## 2025-05-12 ENCOUNTER — LAB REQUISITION (OUTPATIENT)
Dept: LAB | Facility: CLINIC | Age: OVER 89
End: 2025-05-12
Payer: COMMERCIAL

## 2025-05-12 ENCOUNTER — TRANSITIONAL CARE UNIT VISIT (OUTPATIENT)
Dept: GERIATRICS | Facility: CLINIC | Age: OVER 89
End: 2025-05-12
Payer: COMMERCIAL

## 2025-05-12 VITALS
TEMPERATURE: 98.8 F | RESPIRATION RATE: 18 BRPM | DIASTOLIC BLOOD PRESSURE: 64 MMHG | BODY MASS INDEX: 27.77 KG/M2 | HEART RATE: 69 BPM | SYSTOLIC BLOOD PRESSURE: 123 MMHG | WEIGHT: 194 LBS | HEIGHT: 70 IN | OXYGEN SATURATION: 96 %

## 2025-05-12 DIAGNOSIS — E03.9 HYPOTHYROIDISM, UNSPECIFIED TYPE: ICD-10-CM

## 2025-05-12 DIAGNOSIS — D64.9 ANEMIA, UNSPECIFIED: ICD-10-CM

## 2025-05-12 DIAGNOSIS — M96.840 POSTPROCEDURAL HEMATOMA OF A MUSCULOSKELETAL STRUCTURE FOLLOWING A MUSCULOSKELETAL SYSTEM PROCEDURE: ICD-10-CM

## 2025-05-12 DIAGNOSIS — H40.9 GLAUCOMA, UNSPECIFIED GLAUCOMA TYPE, UNSPECIFIED LATERALITY: ICD-10-CM

## 2025-05-12 DIAGNOSIS — Z79.01 ANTICOAGULATED ON ELIQUIS: ICD-10-CM

## 2025-05-12 DIAGNOSIS — I82.451: ICD-10-CM

## 2025-05-12 DIAGNOSIS — R35.1 BENIGN PROSTATIC HYPERPLASIA WITH NOCTURIA: ICD-10-CM

## 2025-05-12 DIAGNOSIS — I10 ESSENTIAL (PRIMARY) HYPERTENSION: ICD-10-CM

## 2025-05-12 DIAGNOSIS — D62 ACUTE POSTHEMORRHAGIC ANEMIA: ICD-10-CM

## 2025-05-12 DIAGNOSIS — M17.0 PRIMARY OSTEOARTHRITIS OF BOTH KNEES: ICD-10-CM

## 2025-05-12 DIAGNOSIS — N40.1 BENIGN PROSTATIC HYPERPLASIA WITH NOCTURIA: ICD-10-CM

## 2025-05-12 DIAGNOSIS — S72.141D DISPLACED INTERTROCHANTERIC FRACTURE OF RIGHT FEMUR, SUBSEQUENT ENCOUNTER FOR CLOSED FRACTURE WITH ROUTINE HEALING: Primary | ICD-10-CM

## 2025-05-12 DIAGNOSIS — R53.81 PHYSICAL DECONDITIONING: ICD-10-CM

## 2025-05-12 DIAGNOSIS — K59.04 CHRONIC IDIOPATHIC CONSTIPATION: ICD-10-CM

## 2025-05-12 DIAGNOSIS — Z91.81 PERSONAL HISTORY OF FALL: ICD-10-CM

## 2025-05-12 PROCEDURE — 99310 SBSQ NF CARE HIGH MDM 45: CPT | Performed by: NURSE PRACTITIONER

## 2025-05-12 RX ORDER — TAMSULOSIN HYDROCHLORIDE 0.4 MG/1
0.4 CAPSULE ORAL AT BEDTIME
COMMUNITY

## 2025-05-12 RX ORDER — BISACODYL 10 MG
10 SUPPOSITORY, RECTAL RECTAL DAILY PRN
COMMUNITY
Start: 2025-05-09

## 2025-05-12 RX ORDER — OXYCODONE HYDROCHLORIDE 5 MG/1
2.5-5 TABLET ORAL EVERY 4 HOURS PRN
COMMUNITY
End: 2025-05-15

## 2025-05-12 NOTE — PROGRESS NOTES
"CC: Lab Recheck     HPI:    Abundio Beckett is a 89 year old  (1935), who is being seen today for an episodic care visit at [facility] ([facility] Today's concern: lab recheck of Hgb, BMP.      Anemia due to blood loss, acute  Essential (primary) hypertension       SUBJECTIVE/OBJECTIVE: No C/O's --pain is zero. A & O x 3, NAD.Non labored resp pattern. Right thigh/hip drsg D&I with Prevena in place.No focal neurological deficits.      BP 90/61   Pulse 73   Temp 98.8  F (37.1  C)   Resp 18   Ht 1.778 m (5' 10\")   Wt 88 kg (194 lb)   SpO2 93%   BMI 27.84 kg/m      LABS: today   Hemoglobin   Date Value Ref Range Status   05/13/2025 10.7 (L) 13.3 - 17.7 g/dL Final   04/28/2025 9.7 (L) 13.3 - 17.7 g/dL Final   12/04/2020 14.4 13.3 - 17.7 g/dL Final   10/09/2019 14.8 13.3 - 17.7 g/dL Final     Recent Labs   Lab Test 05/13/25  0733 04/24/25  0743 04/23/25  0749 04/22/25  0708 04/21/25  0840     --   --   --  137   POTASSIUM 4.0 4.1 4.0 4.4 4.3   CHLORIDE 100  --   --   --  102   CO2 23  --   --   --  25   ANIONGAP 12  --   --   --  10   GLC  --   --  135* 134* 130*   BUN 21.1  --   --   --  13.0   CR 0.76 0.72  --   --  0.73   GHISLAINE 9.0  --   --   --  9.0            ASSESSMENT / PLAN:  (D62) Anemia due to blood loss, acute  (primary encounter diagnosis)  Comment: better, ck prn as condition warrants.    (I10) Essential (primary) hypertension  Comment: SBP running mostly 110-120's,  bmp is fine, monitor    (I63.81) Lacunar infarction (H)  Comment: per staff, some word finding difficulty so will re-order SLP eval. May be related to MCI and/or the lacunar strokes        Electronically Signed by:    Maye Samaniego RN, CNP     "

## 2025-05-12 NOTE — PROGRESS NOTES
[facility]  PRIMARY CARE PROVIDER AND CLINIC:  Colton Chris MD, [address] [place] [id number]  Chief Complaint   Patient presents with    Hospital F/U     [facility] Medical Record Number:  [id number]  Place of Service where encounter took place:  [facility] ([facility]) [[id number]]    Abundio Beckett  is a 89 year old  (1935), admitted to the above facility from  [facility]. Hospital stay 5/6/25 through 5/9/25..  Admitted to this facility for  rehab, medical management, and nursing care.     Displaced intertrochanteric fracture of right femur, subsequent encounter for closed fracture with routine healing  Primary osteoarthritis of both knees  Postprocedural hematoma of a musculoskeletal structure following a musculoskeletal system procedure  Personal history of fall  Acute posthemorrhagic anemia  Physical deconditioning  Acute embolism and thrombosis of right peroneal vein (H)  Anticoagulated on Eliquis  Essential (primary) hypertension  Hypothyroidism, unspecified type  Glaucoma, unspecified glaucoma type, unspecified laterality  Chronic idiopathic constipation  Benign prostatic hyperplasia with nocturia    HPI:    HPI information obtained from: facility chart records, facility staff, patient report, [facility] chart review, and Care Everywhere [facility] chart review.   Brief Summary of Hospital Course: see my admit note from 4/25/2025--he was admitted to TCU after a IM nailing of right hip Fx.  He went to see Ortho on 5/6/2025 and was found to have a hematoma at surgical site that needed evacuation and had an US which showed a Right Peroneal DVT--went to [facility] on 5/7, had  the evacuation w/o issues on 5/7--had a PREVENA Wound Vac in place with F/u in 2 weeks with Ortho.  He was started on Eliquis for likely 30 days,  his ASA was put on hold due to park on a DOAC, Hgb was stable so sent back to TCU.      Updates on Status Since Skilled  nursing Admission: has scant pain,  says it is a 2/10, moving better. No constipation and has that problem at home at times.   He said has a  of son-in-law to go to tomorrow.    No CP, sob, cough, dizziness.    CODE STATUS/ADVANCE DIRECTIVES DISCUSSION:   DNR--Comfort Care  Patient's living condition: lives alone  ALLERGIES: Pravachol [pravastatin] and Simvastatin  PAST MEDICAL HISTORY:  has a past medical history of Basal cell carcinoma, BENIGN PROSTATIC HYPERTROPHY (2002), Essential hypertension, benign (2016), Hyperlipidemia LDL goal <100 (2012), Hypertrophy (benign) of prostate, HYPOTHYROIDISM NOS (2002), Lacunar infarction (H) (2012), and Unspecified hypothyroidism.    He has no past medical history of Malignant melanoma (H) or Squamous cell carcinoma of skin, unspecified.  PAST SURGICAL HISTORY:   has a past surgical history that includes REMV PROSTATE,PERINEAL,RADICAL (2000); SHOULDER ARTHROSCOPY, DX; REPAIR OF NASAL SEPTUM; and Open reduction internal fixation rodding intramedullar femur fracture table (Right, 2025).  FAMILY HISTORY: family history is not on file.  SOCIAL HISTORY:   reports that he has never smoked. He has never used smokeless tobacco. He reports current alcohol use. He reports that he does not use drugs.    Post Discharge Medication Reconciliation Status: discharge medications reconciled and changed, per note/orders     Current Outpatient Medications   Medication Sig Dispense Refill    acetaminophen (TYLENOL) 325 MG tablet Take 975 mg by mouth 3 times daily. Give at ~~0700    1400   2200      amLODIPine (NORVASC) 5 MG tablet TAKE 1 TABLET BY MOUTH DAILY (Patient taking differently: Take 2.5 mg by mouth daily at 2 pm. Hold if sbp <110) 90 tablet 1    apixaban ANTICOAGULANT (ELIQUIS) 5 MG tablet Take 5 mg by mouth 2 times daily.      bisacodyl (DULCOLAX) 10 MG suppository Place 10 mg rectally daily as needed for constipation.      CENTRUM SILVER OR  "TABS 1 TABLET DAILY      latanoprost (XALATAN) 0.005 % ophthalmic solution Place 1 drop into both eyes every evening.      levothyroxine (SYNTHROID/LEVOTHROID) 137 MCG tablet Take 1 tablet (137 mcg) by mouth daily. 100 tablet 2    oxyCODONE (ROXICODONE) 5 MG tablet Take 2.5-5 mg by mouth every 4 hours as needed for severe pain (Give 2.5 mg by mouth every 4 hours as needed for Pain rated 3-6/10 See 5 mg order AND Give 5 mg by mouth every 4 hours as needed for Pain rated 7-10/10 See 2.5 mg order).      polyethylene glycol (MIRALAX) 17 g packet Take 1 packet by mouth daily. 5/5/2025:  and 17gm po in a glass of fluid daily prn      senna-docusate (SENOKOT-S/PERICOLACE) 8.6-50 MG tablet Take 1 tablet by mouth 2 times daily as needed for constipation. 30 tablet 0    sodium phosphate (FLEET ENEMA) 7-19 GM/118ML rectal enema Place 1 enema rectally every 48 hours as needed for constipation.      tamsulosin (FLOMAX) 0.4 MG capsule Take 0.4 mg by mouth at bedtime.      timolol maleate (TIMOPTIC) 0.5 % ophthalmic solution Place 1 drop into both eyes 2 times daily             ROS:  10 point ROS of systems including Constitutional, Eyes, Respiratory, Cardiovascular, Gastroenterology, Genitourinary, Integumentary, Musculoskeletal, Psychiatric were all negative except for pertinent positives noted in my HPI.    Vitals:  /64   Pulse 69   Temp 98.8  F (37.1  C)   Resp 18   Ht 1.778 m (5' 10\")   Wt 88 kg (194 lb)   SpO2 96%   BMI 27.84 kg/m    Exam:  GENERAL APPEARANCE:  Alert, in no distress, appears healthy, oriented, cooperative  ENT:  Mouth with moist mucous membranes, normal hearing acuity  EYES:  Conjunctivae, lids, pupils and irises normal  RESP:  respiratory effort of chest normal, lungs CTA NAD  CV: Auscultation of heart done ,RRR no murmur +1 right leg edema, +2 pedal pulses  ABDOMEN:  normal bowel sounds, soft, nontender,   M/S:   LOREZNO but moves right leg less mobile.  SKIN:  Inspection of skin at baseline, " except right thigh with prevena wound vac in place, drsg is dry and intact  NEURO:   Cranial nerves are grossly intact and at patient's baseline  PSYCH:  oriented X 3, normal insight, judgement and memory, affect and mood normal      Lab/Diagnostic data:  Reviewed from Care Everywhere and [facility]      ASSESSMENT/ASSESSMENT / PLAN:  (S72.387V) Displaced intertrochanteric fracture of right femur, subsequent encounter for closed fracture with routine healin25--IM Nail  (primary encounter diagnosis)  (M17.0) Primary osteoarthritis of both knees  (M96.840) Postprocedural hematoma of a musculoskeletal structure following a musculoskeletal system procedure: I&D 25  (Z91.81) Personal history of fall  (D62) Acute posthemorrhagic anemia  Comment: lessening pain, ck Hgb in am, tylenol, oxycodone prn, monitor. , F.u ortho in 2 weeks, has prevena drain in place    (R53.81) Physical deconditioning  Comment: PT OT    (I82.451) Acute embolism and thrombosis of right peroneal vein (H)  (Z79.01) Anticoagulated on Eliquis  Comment: Eliquis for 30 days then recheck US--edema is better    (I10) Essential (primary) hypertension  Comment: running mostly 106-120's will decrease the norvasc to 2.5mg with hold parameters--discontinue lasix, watch edema. Check BMP in am    (E03.9) Hypothyroidism, unspecified type  Comment: synthroid, F.u outpt    (H40.9) Glaucoma, unspecified glaucoma type, unspecified laterality  Comment: cont current meds,  F/u outpt    (K59.04) Chronic idiopathic constipation  Comment: cont current regimen, monitor.      (N40.1,  R35.1) Benign prostatic hyperplasia with nocturia  Comment: resume Flomax which was started in TCU and stopped in hospital, monitor.    **Updated PCP with POC.    Total time spent with patient visit at the skilled nursing facility was 55 min including patient visit, review of past records, and d/w daughter and family in room. Greater than 50% of total time spent with counseling and  "coordinating care. D/w them and pt that I had d/w his PCP ere: prn lasix--not needed at this time. Also that the Celexa that he had with him had been dc'd in 2022, but pt was taking \"now and then\" for anxiety.  D/w them that the med was not effective for that but pt felt it was.--They agree to discontinue it and if he wishes to start on a med such as that, we can discuss.  He apparently gets \"Teary when telling stories--good or bad\".     Electronically signed by:  CONSTANTINE Gonzalez CNP                      "

## 2025-05-13 ENCOUNTER — TRANSITIONAL CARE UNIT VISIT (OUTPATIENT)
Dept: GERIATRICS | Facility: CLINIC | Age: OVER 89
End: 2025-05-13
Payer: COMMERCIAL

## 2025-05-13 ENCOUNTER — RESULTS FOLLOW-UP (OUTPATIENT)
Dept: GERIATRICS | Facility: CLINIC | Age: OVER 89
End: 2025-05-13

## 2025-05-13 VITALS
DIASTOLIC BLOOD PRESSURE: 61 MMHG | HEART RATE: 73 BPM | OXYGEN SATURATION: 93 % | TEMPERATURE: 98.8 F | BODY MASS INDEX: 27.77 KG/M2 | RESPIRATION RATE: 18 BRPM | SYSTOLIC BLOOD PRESSURE: 90 MMHG | HEIGHT: 70 IN | WEIGHT: 194 LBS

## 2025-05-13 DIAGNOSIS — I10 ESSENTIAL (PRIMARY) HYPERTENSION: ICD-10-CM

## 2025-05-13 DIAGNOSIS — D62 ANEMIA DUE TO BLOOD LOSS, ACUTE: Primary | ICD-10-CM

## 2025-05-13 DIAGNOSIS — I63.81 LACUNAR INFARCTION (H): ICD-10-CM

## 2025-05-13 LAB
ANION GAP SERPL CALCULATED.3IONS-SCNC: 12 MMOL/L (ref 7–15)
BUN SERPL-MCNC: 21.1 MG/DL (ref 8–23)
CALCIUM SERPL-MCNC: 9 MG/DL (ref 8.8–10.4)
CHLORIDE SERPL-SCNC: 100 MMOL/L (ref 98–107)
CREAT SERPL-MCNC: 0.76 MG/DL (ref 0.67–1.17)
EGFRCR SERPLBLD CKD-EPI 2021: 86 ML/MIN/1.73M2
GLUCOSE SERPL-MCNC: 109 MG/DL (ref 70–99)
HCO3 SERPL-SCNC: 23 MMOL/L (ref 22–29)
HGB BLD-MCNC: 10.7 G/DL (ref 13.3–17.7)
POTASSIUM SERPL-SCNC: 4 MMOL/L (ref 3.4–5.3)
SODIUM SERPL-SCNC: 135 MMOL/L (ref 135–145)

## 2025-05-13 PROCEDURE — P9604 ONE-WAY ALLOW PRORATED TRIP: HCPCS | Mod: ORL | Performed by: NURSE PRACTITIONER

## 2025-05-13 PROCEDURE — 80048 BASIC METABOLIC PNL TOTAL CA: CPT | Mod: ORL | Performed by: NURSE PRACTITIONER

## 2025-05-13 PROCEDURE — 36415 COLL VENOUS BLD VENIPUNCTURE: CPT | Mod: ORL | Performed by: NURSE PRACTITIONER

## 2025-05-13 PROCEDURE — 99308 SBSQ NF CARE LOW MDM 20: CPT | Performed by: NURSE PRACTITIONER

## 2025-05-13 PROCEDURE — 85018 HEMOGLOBIN: CPT | Mod: ORL | Performed by: NURSE PRACTITIONER

## 2025-05-15 ENCOUNTER — TRANSITIONAL CARE UNIT VISIT (OUTPATIENT)
Dept: GERIATRICS | Facility: CLINIC | Age: OVER 89
End: 2025-05-15
Payer: COMMERCIAL

## 2025-05-15 VITALS
SYSTOLIC BLOOD PRESSURE: 143 MMHG | DIASTOLIC BLOOD PRESSURE: 72 MMHG | WEIGHT: 194 LBS | OXYGEN SATURATION: 96 % | RESPIRATION RATE: 18 BRPM | HEART RATE: 72 BPM | HEIGHT: 70 IN | TEMPERATURE: 98.2 F | BODY MASS INDEX: 27.77 KG/M2

## 2025-05-15 DIAGNOSIS — Z91.81 PERSONAL HISTORY OF FALL: ICD-10-CM

## 2025-05-15 DIAGNOSIS — I82.451: ICD-10-CM

## 2025-05-15 DIAGNOSIS — M96.840 POSTPROCEDURAL HEMATOMA OF A MUSCULOSKELETAL STRUCTURE FOLLOWING A MUSCULOSKELETAL SYSTEM PROCEDURE: ICD-10-CM

## 2025-05-15 DIAGNOSIS — Z79.01 ANTICOAGULATED ON ELIQUIS: ICD-10-CM

## 2025-05-15 DIAGNOSIS — E03.9 HYPOTHYROIDISM, UNSPECIFIED TYPE: ICD-10-CM

## 2025-05-15 DIAGNOSIS — M17.0 PRIMARY OSTEOARTHRITIS OF BOTH KNEES: ICD-10-CM

## 2025-05-15 DIAGNOSIS — K59.04 CHRONIC IDIOPATHIC CONSTIPATION: ICD-10-CM

## 2025-05-15 DIAGNOSIS — I10 ESSENTIAL (PRIMARY) HYPERTENSION: ICD-10-CM

## 2025-05-15 DIAGNOSIS — S72.141D DISPLACED INTERTROCHANTERIC FRACTURE OF RIGHT FEMUR, SUBSEQUENT ENCOUNTER FOR CLOSED FRACTURE WITH ROUTINE HEALING: Primary | ICD-10-CM

## 2025-05-15 DIAGNOSIS — N40.1 BENIGN PROSTATIC HYPERPLASIA WITH NOCTURIA: ICD-10-CM

## 2025-05-15 DIAGNOSIS — H40.9 GLAUCOMA, UNSPECIFIED GLAUCOMA TYPE, UNSPECIFIED LATERALITY: ICD-10-CM

## 2025-05-15 DIAGNOSIS — D62 ACUTE POSTHEMORRHAGIC ANEMIA: ICD-10-CM

## 2025-05-15 DIAGNOSIS — R35.1 BENIGN PROSTATIC HYPERPLASIA WITH NOCTURIA: ICD-10-CM

## 2025-05-15 DIAGNOSIS — R53.81 PHYSICAL DECONDITIONING: ICD-10-CM

## 2025-05-15 NOTE — PROGRESS NOTES
" [facility]  PRIMARY CARE PROVIDER AND CLINIC:  Colton Chris MD, [address] [place] [phone]  Chief Complaint   Patient presents with    jail Acute     [place] Medical Record Number:  [id number]  Place of Service where encounter took place:  [facility] ([facility]) [[id number]]    Abundio Beckett  is a 89 year old  (1935), admitted to the above facility from  [facility]. Hospital stay 5/6/25 through 5/9/25..  Admitted to this facility for  rehab, medical management, and nursing care.     Displaced intertrochanteric fracture of right femur, subsequent encounter for closed fracture with routine healing  Primary osteoarthritis of both knees  Postprocedural hematoma of a musculoskeletal structure following a musculoskeletal system procedure  Personal history of fall  Acute posthemorrhagic anemia  Physical deconditioning  Acute embolism and thrombosis of right peroneal vein (H)  Anticoagulated on Eliquis  Essential (primary) hypertension  Hypothyroidism, unspecified type  Glaucoma, unspecified glaucoma type, unspecified laterality  Chronic idiopathic constipation  Benign prostatic hyperplasia with nocturia    No pain, bowels off and on constipated.  Eating well, feels is getting stronger. Sleeping generally ok but \"I got stiff last night until I moved around\"      CODE STATUS/ADVANCE DIRECTIVES DISCUSSION:   DNR--Comfort Care  Patient's living condition: lives alone  ALLERGIES: Pravachol [pravastatin] and Simvastatin  PAST MEDICAL HISTORY:  has a past medical history of Basal cell carcinoma, BENIGN PROSTATIC HYPERTROPHY (9/17/2002), Essential hypertension, benign (11/8/2016), Hyperlipidemia LDL goal <100 (5/29/2012), Hypertrophy (benign) of prostate, HYPOTHYROIDISM NOS (9/17/2002), Lacunar infarction (H) (9/4/2012), and Unspecified hypothyroidism.    He has no past medical history of Malignant melanoma (H) or Squamous cell " carcinoma of skin, unspecified.  PAST SURGICAL HISTORY:   has a past surgical history that includes REMV PROSTATE,PERINEAL,RADICAL (01/01/2000); SHOULDER ARTHROSCOPY, DX; REPAIR OF NASAL SEPTUM; and Open reduction internal fixation rodding intramedullar femur fracture table (Right, 4/21/2025).  FAMILY HISTORY: family history is not on file.  SOCIAL HISTORY:   reports that he has never smoked. He has never used smokeless tobacco. He reports current alcohol use. He reports that he does not use drugs.    Post Discharge Medication Reconciliation Status: discharge medications reconciled and changed, per note/orders     Current Outpatient Medications   Medication Sig Dispense Refill    acetaminophen (TYLENOL) 325 MG tablet Take 975 mg by mouth 3 times daily. Give at ~~0700    1400   2200      amLODIPine (NORVASC) 5 MG tablet TAKE 1 TABLET BY MOUTH DAILY (Patient taking differently: Take 2.5 mg by mouth daily at 2 pm. Hold if sbp <110) 90 tablet 1    apixaban ANTICOAGULANT (ELIQUIS) 5 MG tablet Take 5 mg by mouth 2 times daily.      bisacodyl (DULCOLAX) 10 MG suppository Place 10 mg rectally daily as needed for constipation.      CENTRUM SILVER OR TABS 1 TABLET DAILY      latanoprost (XALATAN) 0.005 % ophthalmic solution Place 1 drop into both eyes every evening.      levothyroxine (SYNTHROID/LEVOTHROID) 137 MCG tablet Take 1 tablet (137 mcg) by mouth daily. 100 tablet 2    polyethylene glycol (MIRALAX) 17 g packet Take 1 packet by mouth daily. 5/5/2025:  and 17gm po in a glass of fluid daily prn      senna-docusate (SENOKOT-S/PERICOLACE) 8.6-50 MG tablet Take 1 tablet by mouth 2 times daily as needed for constipation. 30 tablet 0    sodium phosphate (FLEET ENEMA) 7-19 GM/118ML rectal enema Place 1 enema rectally every 48 hours as needed for constipation.      tamsulosin (FLOMAX) 0.4 MG capsule Take 0.4 mg by mouth at bedtime.      timolol maleate (TIMOPTIC) 0.5 % ophthalmic solution Place 1 drop into both eyes 2 times  "daily             ROS:  10 point ROS of systems including Constitutional, Eyes, Respiratory, Cardiovascular, Gastroenterology, Genitourinary, Integumentary, Musculoskeletal, Psychiatric were all negative except for pertinent positives noted in my HPI.    Vitals:  BP (!) 143/72   Pulse 72   Temp 98.2  F (36.8  C)   Resp 18   Ht 1.778 m (5' 10\")   Wt 88 kg (194 lb)   SpO2 96%   BMI 27.84 kg/m    Exam:  GENERAL APPEARANCE:  Alert, in no distress, appears healthy, oriented, cooperative  ENT:  Mouth with moist mucous membranes, normal hearing acuity  EYES:  Conjunctivae, lids, pupils and irises normal  RESP:  respiratory effort of chest normal, lungs clear, non distressed.  CV: Auscultation of heart done ,RRR no murmur.  Trace left leg and +1-2 right leg edema, +2 pedal pulses  ABDOMEN:  normal bowel sounds, soft, nontender,   M/S:   LORENZO and up with nurses and therapy. Old bruising noted right leg down to ankle area is fading.  SKIN:  Inspection of skin at baseline, except right thigh has prevena wound vac--all wnl  NEURO:   Cranial nerves are grossly intact and at patient's baseline  PSYCH:  oriented X 3, normal insight, judgement and memory, affect and mood normal      Lab/Diagnostic data:  Reviewed from Care Everywhere and [facility]      ASSESSMENT/ASSESSMENT / PLAN:  (S72.141D) Displaced intertrochanteric fracture of right femur, subsequent encounter for closed fracture with routine healin25--IM Nail  (primary encounter diagnosis)  (M17.0) Primary osteoarthritis of both knees  (M96.840) Postprocedural hematoma of a musculoskeletal structure following a musculoskeletal system procedure: I&D 25  (Z91.81) Personal history of fall  (D62) Acute posthemorrhagic anemia  Comment:  minimal  pain, cont Tylenol. Not using the oxycodone so will discontinue it.  Will monitor. TCU Ortho appt 2025 at 0930 at [facility]. He has prevena drain in place    (R53.81) Physical deconditioning  Comment: PT OT    (I82.964) " Acute embolism and thrombosis of right peroneal vein (H)  (Z79.01) Anticoagulated on Eliquis  Comment: Eliquis for 30 days then recheck US--edema is better    (I10) Essential (primary) hypertension  Comment: running mostly 110-120's since decreased the Norvasc to 2.5mg  on 5/12, monitor.    (E03.9) Hypothyroidism, unspecified type  Comment: synthroid, F/u as outpt    (H40.9) Glaucoma, unspecified glaucoma type, unspecified laterality  Comment: cont current meds,  F/u outpt    (K59.04) Chronic idiopathic constipation  Comment: cont current regimen, monitor.      (N40.1,  R35.1) Benign prostatic hyperplasia with nocturia  Comment: voiding fine since resumed Flomax, monitor.         Total time spent with patient visit at the skilled nursing facility was 35 min including patient visit, review of past records.  Greater than 50% of total time spent with counseling and coordinating care.      Electronically signed by:  CONSTANTINE Gonzalez CNP

## 2025-05-20 VITALS
OXYGEN SATURATION: 95 % | TEMPERATURE: 97.5 F | WEIGHT: 198 LBS | HEART RATE: 72 BPM | SYSTOLIC BLOOD PRESSURE: 118 MMHG | HEIGHT: 70 IN | BODY MASS INDEX: 28.35 KG/M2 | RESPIRATION RATE: 16 BRPM | DIASTOLIC BLOOD PRESSURE: 69 MMHG

## 2025-05-20 RX ORDER — AMLODIPINE BESYLATE 2.5 MG/1
2.5 TABLET ORAL DAILY
COMMUNITY

## 2025-05-20 NOTE — PROGRESS NOTES
[facility] DISCHARGE SUMMARY  PATIENT'S NAME: Abundio Beckett  YOB: 1935  MEDICAL RECORD NUMBER:  [id number]  Place of Service where encounter took place:  [facility] (TCU) [[id number]]    PRIMARY CARE PROVIDER AND CLINIC RESPONSIBLE AFTER TRANSFER:   Colton Chris MD, [address] [address]    [facility] Provider     Transferring providers: CONSTANTINE Zamarripa CNP; Mildred Price MD  Recent Hospitalization/ED:  [facility] stay 25 to 25.  Date of SNF Admission: 25  Date of SNF (anticipated) Discharge: 25  Discharged to: previous independent home  Cognitive Scores/ Physical Function/ DME:   Walking and independently with activities of daily living     Room 220      CODE STATUS/ADVANCE DIRECTIVES DISCUSSION:  DNR / DNI   ALLERGIES: Pravachol [pravastatin] and Simvastatin    DISCHARGE DIAGNOSIS/NURSING FACILITY COURSE:     Patient Abundio Beckett is a 89 yr old male admitted to [facility] s/post hospitalization [facility] -25 for displaced intertrochanteric fracture of right femur, s/post IM Nail, postprocedural hematoma I&D 25  PMHx hypertension, hypothyroidism, bilateral knee osteoarthritis managed with bilateral steroid injections who was admitted following evacuation of post ops right hip hematoma.  Venous ultrasound showed peroneal venoocclusive DVT.     Displaced intertrochanteric fracture of right femur, subsequent encounter for closed fracture with routine healin25--IM Nail   Primary osteoarthritis of both knees  Postprocedural hematoma of a musculoskeletal structure following a musculoskeletal system procedure: I&D 25  Personal history of fall  acute posthemorrhagic anemia  weakness  Continue tylenol, no longer using oxycodone  Follow up ortho [facility]. He has prevena drain in place     embolism and thrombosis of right peroneal  vein (H)  Eliquis for 30 days then recheck US--edema is better     Essential (primary) hypertension  Blood pressure managed  Continue Norvasc to 2.5mg   Monitor      Hypothyroidism  TSH   Date Value Ref Range Status   03/13/2025 5.73 (H) 0.30 - 4.20 uIU/mL Final   10/19/2022 3.37 0.40 - 4.00 mU/L Final   08/17/2020 3.93 0.40 - 4.00 mU/L Final   Continue synthroid     Glaucoma, unspecified glaucoma type, unspecified laterality  Follow up eye md  Monitor      Chronic idiopathic constipation  Continue on current medications to treat   Monitor       Benign prostatic hyperplasia with nocturia  Continue Flomax  Monitor       Past Medical History:  has a past medical history of Basal cell carcinoma, BENIGN PROSTATIC HYPERTROPHY (9/17/2002), Essential hypertension, benign (11/8/2016), Hyperlipidemia LDL goal <100 (5/29/2012), Hypertrophy (benign) of prostate, HYPOTHYROIDISM NOS (9/17/2002), Lacunar infarction (H) (9/4/2012), and Unspecified hypothyroidism.    He has no past medical history of Malignant melanoma (H) or Squamous cell carcinoma of skin, unspecified.    Discharge Medications:    Current Outpatient Medications   Medication Sig Dispense Refill    acetaminophen (TYLENOL) 325 MG tablet Take 975 mg by mouth 3 times daily. Give at ~~0700    1400   2200      amLODIPine (NORVASC) 2.5 MG tablet Take 2.5 mg by mouth daily.      apixaban ANTICOAGULANT (ELIQUIS) 5 MG tablet Take 5 mg by mouth 2 times daily.      bisacodyl (DULCOLAX) 10 MG suppository Place 10 mg rectally daily as needed for constipation.      CENTRUM SILVER OR TABS 1 TABLET DAILY      latanoprost (XALATAN) 0.005 % ophthalmic solution Place 1 drop into both eyes every evening.      levothyroxine (SYNTHROID/LEVOTHROID) 137 MCG tablet Take 1 tablet (137 mcg) by mouth daily. 100 tablet 2    mineral oil enema Place 1 enema rectally every 48 hours as needed for constipation.      polyethylene glycol (MIRALAX) 17 g packet Take 1 packet by mouth daily. 5/5/2025:   "and 17gm po in a glass of fluid daily prn      senna-docusate (SENOKOT-S/PERICOLACE) 8.6-50 MG tablet Take 1 tablet by mouth 2 times daily as needed for constipation. 30 tablet 0    sodium phosphate (FLEET ENEMA) 7-19 GM/118ML rectal enema Place 1 enema rectally every 48 hours as needed for constipation.      tamsulosin (FLOMAX) 0.4 MG capsule Take 0.4 mg by mouth at bedtime.      timolol maleate (TIMOPTIC) 0.5 % ophthalmic solution Place 1 drop into both eyes 2 times daily         Medication Changes/Rationale:   See HPI    Controlled medications sent with patient:   not applicable/none     ROS:   4 point ROS including Respiratory, CV, GI and , other than that noted in the HPI,  is negative    Physical Exam:   Vitals: /69   Pulse 72   Temp 97.5  F (36.4  C)   Resp 16   Ht 1.778 m (5' 10\")   Wt 89.8 kg (198 lb)   SpO2 95%   BMI 28.41 kg/m    BMI= Body mass index is 28.41 kg/m .  GENERAL APPEARANCE:  Alert, in no distress  RESP:  respiratory effort and palpation of chest normal, lungs clear to auscultation , no respiratory distress  CV:  Palpation and auscultation of heart done , regular rate and rhythm  ABDOMEN:  normal bowel sounds, soft, nontender  M/S:   sitting in recliner  SKIN:  Inspection of skin and subcutaneous tissue baseline, trace swelling right lower legs, no redness in calves  NEURO:   Cranial nerves 2-12 are normal tested and grossly at patient's baseline  PSYCH:  oriented X 3, some confusion    SNF labs: Labs done in SNF are in [place] EPIC. Please refer to them using EPIC/Care Everywhere.  Last Comprehensive Metabolic Panel:  Lab Results   Component Value Date     05/13/2025    POTASSIUM 4.0 05/13/2025    CHLORIDE 100 05/13/2025    CO2 23 05/13/2025    ANIONGAP 12 05/13/2025     (H) 05/13/2025    BUN 21.1 05/13/2025    CR 0.76 05/13/2025    GFRESTIMATED 86 05/13/2025    GHISLAINE 9.0 05/13/2025       Lab Results   Component Value Date    WBC 7.1 04/24/2025    WBC 8.5 09/10/2016 " "    Lab Results   Component Value Date    RBC 3.14 04/24/2025    RBC 4.78 09/10/2016     Lab Results   Component Value Date    HGB 10.7 05/13/2025    HGB 14.4 12/04/2020     Lab Results   Component Value Date    HCT 30.4 04/24/2025    HCT 44.7 09/10/2016     No components found for: \"MCT\"  Lab Results   Component Value Date    MCV 97 04/24/2025    MCV 94 09/10/2016     Lab Results   Component Value Date    MCH 32.8 04/24/2025    MCH 33.7 09/10/2016     Lab Results   Component Value Date    MCHC 33.9 04/24/2025    MCHC 36.0 09/10/2016     Lab Results   Component Value Date    RDW 12.7 04/24/2025    RDW 12.3 09/10/2016     Lab Results   Component Value Date     04/24/2025     09/10/2016         DISCHARGE PLAN:  Follow up labs: No labs orders/due  Medical Follow Up:      Follow up with primary care provider in 1-2 weeks  MT referral needed and placed by this provider: No  Current [place] scheduled appointments:       Jun 04, 2025 10:00 AM  (Arrive by 9:45 AM)  ED/Hospital Follow Up with Colton Chris MD  [facility] ([facility]) [phone]     Oct 22, 2025 10:30 AM  (Arrive by 10:15 AM)  Return Dermatology with Jah Peters MD  [facility] ([facility]) [phone]            Discharge Services: Home Care:  Occupational Therapy, Physical Therapy, Registered Nurse, Home Health Aide, , and From:  [facility]        TOTAL DISCHARGE TIME:   Greater than 30 minutes  Electronically signed by:  CONSTANTINE James CNP     Home care Face to Face documentation done in [facility] attached to Home care orders for homecare.                 "

## 2025-05-21 ENCOUNTER — DISCHARGE SUMMARY NURSING HOME (OUTPATIENT)
Dept: GERIATRICS | Facility: CLINIC | Age: OVER 89
End: 2025-05-21
Payer: COMMERCIAL

## 2025-05-21 DIAGNOSIS — R53.81 PHYSICAL DECONDITIONING: ICD-10-CM

## 2025-05-21 DIAGNOSIS — S72.141D DISPLACED INTERTROCHANTERIC FRACTURE OF RIGHT FEMUR, SUBSEQUENT ENCOUNTER FOR CLOSED FRACTURE WITH ROUTINE HEALING: Primary | ICD-10-CM

## 2025-05-21 DIAGNOSIS — Z91.81 PERSONAL HISTORY OF FALL: ICD-10-CM

## 2025-05-21 DIAGNOSIS — Z86.718 PERSONAL HISTORY OF DVT (DEEP VEIN THROMBOSIS): ICD-10-CM

## 2025-05-21 DIAGNOSIS — M17.0 PRIMARY OSTEOARTHRITIS OF BOTH KNEES: ICD-10-CM

## 2025-05-21 PROCEDURE — 99316 NF DSCHRG MGMT 30 MIN+: CPT | Performed by: NURSE PRACTITIONER

## 2025-05-21 NOTE — LETTER
2025      Abundio Beckett  [address]  [place] [address]        [facility] DISCHARGE SUMMARY  PATIENT'S NAME: Abundio Beckett  YOB: 1935  MEDICAL RECORD NUMBER:  [id number]  Place of Service where encounter took place:  [facility] ([facility]) [[id number]]    PRIMARY CARE PROVIDER AND CLINIC RESPONSIBLE AFTER TRANSFER:   Colton Chris MD, [address] / [facility] [address]    [facility] Provider     Transferring providers: CONSTANTINE Zamarripa CNP; Mildred Price MD  Recent Hospitalization/ED:  [facility] stay 25 to 25.  Date of SNF Admission: 25  Date of SNF (anticipated) Discharge: 25  Discharged to: previous independent home  Cognitive Scores/ Physical Function/ DME:   Walking and independently with activities of daily living     Room 220      CODE STATUS/ADVANCE DIRECTIVES DISCUSSION:  DNR / DNI   ALLERGIES: Pravachol [pravastatin] and Simvastatin    DISCHARGE DIAGNOSIS/NURSING FACILITY COURSE:     Patient Abundio Beckett is a 89 yr old male admitted to [facility] TCU s/post hospitalization [facility] -25 for displaced intertrochanteric fracture of right femur, s/post IM Nail, postprocedural hematoma I&D 25  PMHx hypertension, hypothyroidism, bilateral knee osteoarthritis managed with bilateral steroid injections who was admitted following evacuation of post ops right hip hematoma.  Venous ultrasound showed peroneal venoocclusive DVT.     Displaced intertrochanteric fracture of right femur, subsequent encounter for closed fracture with routine healin25--IM Nail   Primary osteoarthritis of both knees  Postprocedural hematoma of a musculoskeletal structure following a musculoskeletal system procedure: I&D 25  Personal history of fall  acute posthemorrhagic anemia  weakness  Continue tylenol, no longer using oxycodone  Follow up ortho  [facility]. He has prevena drain in place     embolism and thrombosis of right peroneal vein (H)  Eliquis for 30 days then recheck US--edema is better     Essential (primary) hypertension  Blood pressure managed  Continue Norvasc to 2.5mg   Monitor      Hypothyroidism  TSH   Date Value Ref Range Status   03/13/2025 5.73 (H) 0.30 - 4.20 uIU/mL Final   10/19/2022 3.37 0.40 - 4.00 mU/L Final   08/17/2020 3.93 0.40 - 4.00 mU/L Final   Continue synthroid     Glaucoma, unspecified glaucoma type, unspecified laterality  Follow up eye md  Monitor      Chronic idiopathic constipation  Continue on current medications to treat   Monitor       Benign prostatic hyperplasia with nocturia  Continue Flomax  Monitor       Past Medical History:  has a past medical history of Basal cell carcinoma, BENIGN PROSTATIC HYPERTROPHY (9/17/2002), Essential hypertension, benign (11/8/2016), Hyperlipidemia LDL goal <100 (5/29/2012), Hypertrophy (benign) of prostate, HYPOTHYROIDISM NOS (9/17/2002), Lacunar infarction (H) (9/4/2012), and Unspecified hypothyroidism.    He has no past medical history of Malignant melanoma (H) or Squamous cell carcinoma of skin, unspecified.    Discharge Medications:    Current Outpatient Medications   Medication Sig Dispense Refill     acetaminophen (TYLENOL) 325 MG tablet Take 975 mg by mouth 3 times daily. Give at ~~0700    1400   2200       amLODIPine (NORVASC) 2.5 MG tablet Take 2.5 mg by mouth daily.       apixaban ANTICOAGULANT (ELIQUIS) 5 MG tablet Take 5 mg by mouth 2 times daily.       bisacodyl (DULCOLAX) 10 MG suppository Place 10 mg rectally daily as needed for constipation.       CENTRUM SILVER OR TABS 1 TABLET DAILY       latanoprost (XALATAN) 0.005 % ophthalmic solution Place 1 drop into both eyes every evening.       levothyroxine (SYNTHROID/LEVOTHROID) 137 MCG tablet Take 1 tablet (137 mcg) by mouth daily. 100 tablet 2     mineral oil enema Place 1 enema rectally every 48 hours as needed for  "constipation.       polyethylene glycol (MIRALAX) 17 g packet Take 1 packet by mouth daily. 5/5/2025:  and 17gm po in a glass of fluid daily prn       senna-docusate (SENOKOT-S/PERICOLACE) 8.6-50 MG tablet Take 1 tablet by mouth 2 times daily as needed for constipation. 30 tablet 0     sodium phosphate (FLEET ENEMA) 7-19 GM/118ML rectal enema Place 1 enema rectally every 48 hours as needed for constipation.       tamsulosin (FLOMAX) 0.4 MG capsule Take 0.4 mg by mouth at bedtime.       timolol maleate (TIMOPTIC) 0.5 % ophthalmic solution Place 1 drop into both eyes 2 times daily         Medication Changes/Rationale:   See HPI    Controlled medications sent with patient:   not applicable/none     ROS:   4 point ROS including Respiratory, CV, GI and , other than that noted in the HPI,  is negative    Physical Exam:   Vitals: /69   Pulse 72   Temp 97.5  F (36.4  C)   Resp 16   Ht 1.778 m (5' 10\")   Wt 89.8 kg (198 lb)   SpO2 95%   BMI 28.41 kg/m    BMI= Body mass index is 28.41 kg/m .  GENERAL APPEARANCE:  Alert, in no distress  RESP:  respiratory effort and palpation of chest normal, lungs clear to auscultation , no respiratory distress  CV:  Palpation and auscultation of heart done , regular rate and rhythm  ABDOMEN:  normal bowel sounds, soft, nontender  M/S:   sitting in recliner  SKIN:  Inspection of skin and subcutaneous tissue baseline, trace swelling right lower legs, no redness in calves  NEURO:   Cranial nerves 2-12 are normal tested and grossly at patient's baseline  PSYCH:  oriented X 3, some confusion    SNF labs: Labs done in SNF are in [place] EPIC. Please refer to them using EPIC/Care Everywhere.  Last Comprehensive Metabolic Panel:  Lab Results   Component Value Date     05/13/2025    POTASSIUM 4.0 05/13/2025    CHLORIDE 100 05/13/2025    CO2 23 05/13/2025    ANIONGAP 12 05/13/2025     (H) 05/13/2025    BUN 21.1 05/13/2025    CR 0.76 05/13/2025    GFRESTIMATED 86 05/13/2025 " "   GHISLAINE 9.0 05/13/2025       Lab Results   Component Value Date    WBC 7.1 04/24/2025    WBC 8.5 09/10/2016     Lab Results   Component Value Date    RBC 3.14 04/24/2025    RBC 4.78 09/10/2016     Lab Results   Component Value Date    HGB 10.7 05/13/2025    HGB 14.4 12/04/2020     Lab Results   Component Value Date    HCT 30.4 04/24/2025    HCT 44.7 09/10/2016     No components found for: \"MCT\"  Lab Results   Component Value Date    MCV 97 04/24/2025    MCV 94 09/10/2016     Lab Results   Component Value Date    MCH 32.8 04/24/2025    MCH 33.7 09/10/2016     Lab Results   Component Value Date    MCHC 33.9 04/24/2025    MCHC 36.0 09/10/2016     Lab Results   Component Value Date    RDW 12.7 04/24/2025    RDW 12.3 09/10/2016     Lab Results   Component Value Date     04/24/2025     09/10/2016         DISCHARGE PLAN:  Follow up labs: No labs orders/due  Medical Follow Up:      Follow up with primary care provider in 1-2 weeks  MTM referral needed and placed by this provider: No  Current [place] scheduled appointments:       Jun 04, 2025 10:00 AM  (Arrive by 9:45 AM)  ED/Hospital Follow Up with Colton Chris MD  [facility] ([facility]) [phone]     Oct 22, 2025 10:30 AM  (Arrive by 10:15 AM)  Return Dermatology with Jah Peters MD  [facility] ([facility]) [phone]            Discharge Services: Home Care:  Occupational Therapy, Physical Therapy, Registered Nurse, Home Health Aide, , and From:  [facility]        TOTAL DISCHARGE TIME:   Greater than 30 minutes  Electronically signed by:  CONSTANTINE James CNP     Home care Face to Face documentation done in [facility] attached to Home care orders for homecare.                   Sincerely,        CONSTANTINE James CNP    Electronically signed  "

## 2025-05-28 ENCOUNTER — DISCHARGE SUMMARY NURSING HOME (OUTPATIENT)
Dept: GERIATRICS | Facility: CLINIC | Age: OVER 89
End: 2025-05-28
Payer: COMMERCIAL

## 2025-05-28 VITALS
HEART RATE: 75 BPM | HEIGHT: 70 IN | TEMPERATURE: 97.7 F | BODY MASS INDEX: 27.86 KG/M2 | DIASTOLIC BLOOD PRESSURE: 85 MMHG | SYSTOLIC BLOOD PRESSURE: 165 MMHG | OXYGEN SATURATION: 96 % | WEIGHT: 194.6 LBS | RESPIRATION RATE: 18 BRPM

## 2025-05-28 DIAGNOSIS — Z79.01 ANTICOAGULATED ON ELIQUIS: ICD-10-CM

## 2025-05-28 DIAGNOSIS — M17.0 PRIMARY OSTEOARTHRITIS OF BOTH KNEES: ICD-10-CM

## 2025-05-28 DIAGNOSIS — M96.840 POSTPROCEDURAL HEMATOMA OF A MUSCULOSKELETAL STRUCTURE FOLLOWING A MUSCULOSKELETAL SYSTEM PROCEDURE: ICD-10-CM

## 2025-05-28 DIAGNOSIS — R35.1 BENIGN PROSTATIC HYPERPLASIA WITH NOCTURIA: ICD-10-CM

## 2025-05-28 DIAGNOSIS — H40.9 GLAUCOMA, UNSPECIFIED GLAUCOMA TYPE, UNSPECIFIED LATERALITY: ICD-10-CM

## 2025-05-28 DIAGNOSIS — Z91.81 PERSONAL HISTORY OF FALL: ICD-10-CM

## 2025-05-28 DIAGNOSIS — S72.141D DISPLACED INTERTROCHANTERIC FRACTURE OF RIGHT FEMUR, SUBSEQUENT ENCOUNTER FOR CLOSED FRACTURE WITH ROUTINE HEALING: Primary | ICD-10-CM

## 2025-05-28 DIAGNOSIS — I82.451: ICD-10-CM

## 2025-05-28 DIAGNOSIS — E03.9 HYPOTHYROIDISM, UNSPECIFIED TYPE: ICD-10-CM

## 2025-05-28 DIAGNOSIS — D62 ACUTE POSTHEMORRHAGIC ANEMIA: ICD-10-CM

## 2025-05-28 DIAGNOSIS — I10 ESSENTIAL (PRIMARY) HYPERTENSION: ICD-10-CM

## 2025-05-28 DIAGNOSIS — N40.1 BENIGN PROSTATIC HYPERPLASIA WITH NOCTURIA: ICD-10-CM

## 2025-05-28 DIAGNOSIS — K59.04 CHRONIC IDIOPATHIC CONSTIPATION: ICD-10-CM

## 2025-05-28 DIAGNOSIS — R53.81 PHYSICAL DECONDITIONING: ICD-10-CM

## 2025-05-28 PROCEDURE — 99316 NF DSCHRG MGMT 30 MIN+: CPT | Performed by: NURSE PRACTITIONER

## 2025-05-28 NOTE — PROGRESS NOTES
[facility] DISCHARGE SUMMARY    PATIENT'S NAME: Abundio Beckett  YOB: 1935    PRIMARY CARE PROVIDER AND CLINIC RESPONSIBLE AFTER TRANSFER: Colton Chris     CODE STATUS: DNR/DNI    TRANSFERRING PROVIDERS: CONSTANTINE Gonzalez CNP, Dr. Mildred Price MD      DATE OF SNF ADMISSION:  May / 09 / 2025.    DATE OF SNF DISCHARGE (including anticipating DC): May / 29 / 2025.    DISCHARGE DISPOSITION: [facility] Provider    Nursing Facility: [facility] stay 5/6/25 to 5/9/25.     Condition on Discharge:  Improving.    Function:  Ambulates: 150 ft w/fww,Transfers: cga-sba  Cognitive Scores: SLUMS 24/30 and CPT 4.5/5.6    Physical Function: TUG 75  Equipment: walker  DME: Walker    DISCHARGE DIAGNOSIS:      Displaced intertrochanteric fracture of right femur, subsequent encounter for closed fracture with routine healing  Primary osteoarthritis of both knees  Postprocedural hematoma of a musculoskeletal structure following a musculoskeletal system procedure  Acute posthemorrhagic anemia  Personal history of fall  Physical deconditioning  Acute embolism and thrombosis of right peroneal vein (H)  Anticoagulated on Eliquis  Essential (primary) hypertension  Hypothyroidism, unspecified type  Glaucoma, unspecified glaucoma type, unspecified laterality  Chronic idiopathic constipation  Benign prostatic hyperplasia with nocturia        HOSPITAL COURSE: Please see my admit note from 4/25/2025--he was admitted to TCU after a IM nailing of right hip Fx.  He went to see Ortho on 5/6/2025 and was found to have a hematoma at surgical site that needed evacuation and had an US which showed a Right Peroneal DVT--went to [facility] on 5/7, had  the evacuation w/o issues on 5/7--had a PREVENA Wound Vac in place with F/u in 2 weeks with Ortho.  He was started on Eliquis for likely 30 days,  his ASA was put on hold due to being on a DOAC, Hgb was stable so sent  back to TCU.        TCU/SNF COURSE: since that time, he went to Ortho on  5/19--Prevena removed, he has healed well. Edema is better right leg, he has no pains. Ortho wished for PCP to check US next week and decide on length of DOAC. I d/w his PCP and they defer to Dr Richy Junior to F/u with Doppler US and length of Eliquis.  I left a detailed msg today in Dr Richy Junior's 's phone re this.  His moods have been good.      PAST MEDICAL HISTORY:  Past Medical History:   Diagnosis Date    Basal cell carcinoma     BENIGN PROSTATIC HYPERTROPHY 9/17/2002    Essential hypertension, benign 11/8/2016    Hyperlipidemia LDL goal <100 5/29/2012    Hypertrophy (benign) of prostate     abstracted 7/5/02.    HYPOTHYROIDISM NOS 9/17/2002    Lacunar infarction (H) 9/4/2012    Unspecified hypothyroidism     abstracted 7/5/02.       DISCHARGE MEDICATIONS:  Current Outpatient Medications   Medication Sig Dispense Refill    acetaminophen (TYLENOL) 325 MG tablet Take 975 mg by mouth 3 times daily. Give at ~~0700    1400   2200      amLODIPine (NORVASC) 2.5 MG tablet Take 2.5 mg by mouth daily.      apixaban ANTICOAGULANT (ELIQUIS) 5 MG tablet Take 5 mg by mouth 2 times daily.      CENTRUM SILVER OR TABS 1 TABLET DAILY      latanoprost (XALATAN) 0.005 % ophthalmic solution Place 1 drop into both eyes every evening.      levothyroxine (SYNTHROID/LEVOTHROID) 137 MCG tablet Take 1 tablet (137 mcg) by mouth daily. 100 tablet 2    polyethylene glycol (MIRALAX) 17 g packet Take 1 packet by mouth daily. 5/5/2025:  and 17gm po in a glass of fluid daily prn      senna-docusate (SENOKOT-S/PERICOLACE) 8.6-50 MG tablet Take 1 tablet by mouth 2 times daily as needed for constipation. 30 tablet 0    tamsulosin (FLOMAX) 0.4 MG capsule Take 0.4 mg by mouth at bedtime.      timolol maleate (TIMOPTIC) 0.5 % ophthalmic solution Place 1 drop into both eyes 2 times daily       MED REC REQUIRED   Post Medication Reconciliation Status: discharge  "medications reconciled and changed, per note/orders   MEDICATION CHANGES/RATIONALE:   Dulcolax and fleets dc'd  BP (!) 165/85   Pulse 75   Temp 97.7  F (36.5  C)   Resp 18   Ht 1.778 m (5' 10\")   Wt 88.3 kg (194 lb 9.6 oz)   SpO2 96%   BMI 27.92 kg/m      TODAY DURING EXAM/ROS:  No CP, SOB, Cough, dizziness, fevers, chills, HA, N/V, dysuria or Bowel Abnormalities. Appetite is good.  No pain    PHYSICAL EXAM Today:  A & O x 3, NAD. Lungs CTA, non labored. RRR, S1/S2 w/o murmur,gallop or rub.  No left leg but +1-2 right leg edema.  Abdomen soft, nontender, +BT'S. No focal neurological deficits. LORENZO and up with walker.   Incision is healed right hip area.       SNF LABS  Recent Labs   Lab Test 05/13/25  0733 04/24/25  0743 04/23/25  0749 04/22/25  0708 04/21/25  0840     --   --   --  137   POTASSIUM 4.0 4.1 4.0   < > 4.3   CHLORIDE 100  --   --   --  102   CO2 23  --   --   --  25   ANIONGAP 12  --   --   --  10   *  --  135*   < > 130*   BUN 21.1  --   --   --  13.0   CR 0.76 0.72  --   --  0.73   GHISLAINE 9.0  --   --   --  9.0    < > = values in this interval not displayed.     Hemoglobin   Date Value Ref Range Status   05/13/2025 10.7 (L) 13.3 - 17.7 g/dL Final   04/28/2025 9.7 (L) 13.3 - 17.7 g/dL Final   12/04/2020 14.4 13.3 - 17.7 g/dL Final   10/09/2019 14.8 13.3 - 17.7 g/dL Final             DISCHARGE PLAN:  Occupational Therapy, Physical Therapy, Registered Nurse, Home Health Aide, , and From:  [facility], [facility] Follow-up with PCP in 7 days: 7 days.    Current [place] or other scheduled appointments:  Future Appointments   Date Time Provider Department Center   6/4/2025 10:00 AM Colton Chris MD [facility]   10/22/2025 10:30 AM Jah Peters MD [facility] referral needed and placed by this provider: none    Pending or Future Labs: none     Discharge Treatments:none but will need doppler us of right leg as noted " above  ..........................................................................................................................................  [facility] Discharge Orders    Name: Abundio Beckett  : 1935    DISCHARGE MEDICATIONS:  The patient s pharmacy is authorized to dispense a 30-day supply of medications. Refill requests should be directed to the primary provider, Colton Chris   No narcotics are prescribed at time of discharge.   Current Outpatient Medications   Medication Sig Dispense Refill    acetaminophen (TYLENOL) 325 MG tablet Take 975 mg by mouth 3 times daily. Give at ~~0700    1400   2200      amLODIPine (NORVASC) 2.5 MG tablet Take 2.5 mg by mouth daily.      apixaban ANTICOAGULANT (ELIQUIS) 5 MG tablet Take 5 mg by mouth 2 times daily.      CENTRUM SILVER OR TABS 1 TABLET DAILY      latanoprost (XALATAN) 0.005 % ophthalmic solution Place 1 drop into both eyes every evening.      levothyroxine (SYNTHROID/LEVOTHROID) 137 MCG tablet Take 1 tablet (137 mcg) by mouth daily. 100 tablet 2    polyethylene glycol (MIRALAX) 17 g packet Take 1 packet by mouth daily. 2025:  and 17gm po in a glass of fluid daily prn      senna-docusate (SENOKOT-S/PERICOLACE) 8.6-50 MG tablet Take 1 tablet by mouth 2 times daily as needed for constipation. 30 tablet 0    tamsulosin (FLOMAX) 0.4 MG capsule Take 0.4 mg by mouth at bedtime.      timolol maleate (TIMOPTIC) 0.5 % ophthalmic solution Place 1 drop into both eyes 2 times daily           This document was electronically signed on May 28, 2025         TOTAL DISCHARGE TIME:   Greater than 30 minutes    CONSTANTINE Gonzalez [facility]   ...........................................................................................................................................      Documentation of Face-to-Face and Certification for Home Health Services     Patient: Abundio ADE Beckett   YOB: 1935  MR  Number: [id number]  Today's Date: 5/28/2025    I certify that patient: Abundio Beckett is under my care and that I, or a nurse practitioner or physician's assistant working with me, had a face-to-face encounter that meets the physician face-to-face encounter requirements with this patient on: 5/28/2025.    This encounter with the patient was in whole, or in part, for the following medical condition, which is the primary reason for home health care:      Displaced intertrochanteric fracture of right femur, subsequent encounter for closed fracture with routine healing  Primary osteoarthritis of both knees  Postprocedural hematoma of a musculoskeletal structure following a musculoskeletal system procedure  Acute posthemorrhagic anemia  Personal history of fall  Physical deconditioning  Acute embolism and thrombosis of right peroneal vein (H)  Anticoagulated on Eliquis  Essential (primary) hypertension  Hypothyroidism, unspecified type  Glaucoma, unspecified glaucoma type, unspecified laterality  Chronic idiopathic constipation  Benign prostatic hyperplasia with nocturia  .    I certify that, based on my findings, the following services are medically necessary home health services: Occupational Therapy, Physical Therapy, and HHA.    My clinical findings support the need for the above services because: Occupational Therapy Services are needed to assess and treat cognitive ability and address ADL safety due to impairment in safety eval, cognition. and Physical Therapy Services are needed to assess and treat the following functional impairments: deconditioning and weakness.    Further, I certify that my clinical findings support that this patient is homebound (i.e. absences from home require considerable and taxing effort and are for medical reasons or [employer] services or infrequently or of short duration when for other reasons) because: Requires assistance of another person or specialized equipment to access medical  services because patient: Is unable to walk greater than 150 feet without rest...    Based on the above findings. I certify that this patient is confined to the home and needs intermittent skilled nursing care, physical therapy and/or speech therapy.  The patient is under my care, and I have initiated the establishment of the plan of care.  This patient will be followed by a physician who will periodically review the plan of care.  Physician/Provider to provide follow up care: Colton Chris    Responsible Medicare certified PECOS Physician: Maye Samaniego RN CNP  Physician Signature: See electronic signature associated with these discharge orders.  Date: 5/28/2025

## 2025-05-29 ENCOUNTER — MEDICAL CORRESPONDENCE (OUTPATIENT)
Dept: HEALTH INFORMATION MANAGEMENT | Facility: CLINIC | Age: OVER 89
End: 2025-05-29

## 2025-06-03 ENCOUNTER — MEDICAL CORRESPONDENCE (OUTPATIENT)
Dept: HEALTH INFORMATION MANAGEMENT | Facility: CLINIC | Age: OVER 89
End: 2025-06-03

## 2025-06-03 DIAGNOSIS — Z53.9 DIAGNOSIS NOT YET DEFINED: Primary | ICD-10-CM

## 2025-06-03 PROCEDURE — G0180 MD CERTIFICATION HHA PATIENT: HCPCS | Performed by: INTERNAL MEDICINE

## 2025-06-04 ENCOUNTER — OFFICE VISIT (OUTPATIENT)
Dept: INTERNAL MEDICINE | Facility: CLINIC | Age: OVER 89
End: 2025-06-04
Payer: COMMERCIAL

## 2025-06-04 ENCOUNTER — RESULTS FOLLOW-UP (OUTPATIENT)
Dept: GERIATRICS | Facility: CLINIC | Age: OVER 89
End: 2025-06-04

## 2025-06-04 VITALS
HEART RATE: 97 BPM | DIASTOLIC BLOOD PRESSURE: 60 MMHG | WEIGHT: 197.3 LBS | SYSTOLIC BLOOD PRESSURE: 112 MMHG | HEIGHT: 70 IN | RESPIRATION RATE: 16 BRPM | TEMPERATURE: 97.4 F | OXYGEN SATURATION: 97 % | BODY MASS INDEX: 28.24 KG/M2

## 2025-06-04 DIAGNOSIS — R41.3 MEMORY CHANGES: ICD-10-CM

## 2025-06-04 DIAGNOSIS — R73.9 HYPERGLYCEMIA: ICD-10-CM

## 2025-06-04 DIAGNOSIS — Z09 HOSPITAL DISCHARGE FOLLOW-UP: Primary | ICD-10-CM

## 2025-06-04 DIAGNOSIS — S72.141D DISPLACED INTERTROCHANTERIC FRACTURE OF RIGHT FEMUR, SUBSEQUENT ENCOUNTER FOR CLOSED FRACTURE WITH ROUTINE HEALING: ICD-10-CM

## 2025-06-04 DIAGNOSIS — M96.840 POSTPROCEDURAL HEMATOMA OF A MUSCULOSKELETAL STRUCTURE FOLLOWING A MUSCULOSKELETAL SYSTEM PROCEDURE: ICD-10-CM

## 2025-06-04 DIAGNOSIS — I82.451: ICD-10-CM

## 2025-06-04 DIAGNOSIS — I10 ESSENTIAL HYPERTENSION, BENIGN: ICD-10-CM

## 2025-06-04 DIAGNOSIS — D62 ACUTE POSTHEMORRHAGIC ANEMIA: ICD-10-CM

## 2025-06-04 LAB
ERYTHROCYTE [DISTWIDTH] IN BLOOD BY AUTOMATED COUNT: 12.1 % (ref 10–15)
EST. AVERAGE GLUCOSE BLD GHB EST-MCNC: 100 MG/DL
HBA1C MFR BLD: 5.1 % (ref 0–5.6)
HCT VFR BLD AUTO: 37.3 % (ref 40–53)
HGB BLD-MCNC: 12.8 G/DL (ref 13.3–17.7)
MCH RBC QN AUTO: 33.4 PG (ref 26.5–33)
MCHC RBC AUTO-ENTMCNC: 34.3 G/DL (ref 31.5–36.5)
MCV RBC AUTO: 97 FL (ref 78–100)
PLATELET # BLD AUTO: 151 10E3/UL (ref 150–450)
RBC # BLD AUTO: 3.83 10E6/UL (ref 4.4–5.9)
WBC # BLD AUTO: 6.7 10E3/UL (ref 4–11)

## 2025-06-04 PROCEDURE — 83036 HEMOGLOBIN GLYCOSYLATED A1C: CPT | Performed by: INTERNAL MEDICINE

## 2025-06-04 PROCEDURE — 36415 COLL VENOUS BLD VENIPUNCTURE: CPT | Performed by: INTERNAL MEDICINE

## 2025-06-04 PROCEDURE — 85027 COMPLETE CBC AUTOMATED: CPT | Performed by: INTERNAL MEDICINE

## 2025-06-04 ASSESSMENT — PAIN SCALES - GENERAL: PAINLEVEL_OUTOF10: NO PAIN (0)

## 2025-06-04 NOTE — PROGRESS NOTES
Assessment & Plan     Hospital discharge follow-up  Overall stable and continuing to do fairly well postoperatively.    Postprocedural hematoma of a musculoskeletal structure following a musculoskeletal system procedure: I&D 25  Follow up with orthopedics as previously scheduled.    Displaced intertrochanteric fracture of right femur, subsequent encounter for closed fracture with routine healin25--IM Nail  With orthopedic assessment and follow-up with imaging and rehab as directed advised patient to wear DENIS hose for lower extremity support    Acute posthemorrhagic anemia  Hemoglobin   Date Value Ref Range Status   2025 10.7 (L) 13.3 - 17.7 g/dL Final   2020 14.4 13.3 - 17.7 g/dL Final   Suggest repeat hemoglobin as noted.    Acute embolism and thrombosis of right peroneal vein (H)  Following up with orthopedics as directed for 30-day repeat lower extremity ultrasound.  Continuing with Eliquis as ordered.  Might benefit from 3 months treatment therapy.  Found to have occlusive DVT in the peroneal vein in the right calf. Would consider this a distal, provoked DVT. Discussed with orthopedic surgery, ultimately plan for apixaban 5 mg twice a day for 30 days and then reultrasound to see if any propagation of DVT. If not can likely stop anticoagulation at that time.    Memory changes  Placed per patient request for ongoing issues with short-term memory loss and mild tremor.  - Adult Neurology  Referral; Future    Hyperglycemia  Check A1c level based on prior blood sugar test noted  - Hemoglobin A1c; Future    Essential hypertension, benign  Stable.  Patient demonstrates no outward complaints of orthostatic change.  Discussed discontinuing amlodipine although would prefer at present time in the absence of symptoms to observe and encourage hydration        MED REC REQUIRED  Post Medication Reconciliation Status: discharge medications reconciled, continue medications without change          Maty Del Castillo is a 89 year old, presenting for the following health issues:  No chief complaint on file.    HPI      Other concerns:  Requesting to updated covid vaccine today   Shaking in hands L>R (concerns about senior onset diabetes or parkinson's)   Requesting ears are checked for wax   Drooling, increased saliva  x 1 month  Disability parking certificate   Requesting cognitive testing   Receiving US follow up for DVT right leg with [facility] tomorrow and questioning how to receive results. Swelling in right leg     Hospital Follow-up Visit:    Hospital/Nursing Home/IP Rehab Facility: [facility]  Most Recent Admission Date: 4/20/2025   Most Recent Admission Diagnosis: Hip fracture (H) - S72.009A     Most Recent Discharge Date: 4/24/2025   Most Recent Discharge Diagnosis: Closed displaced intertrochanteric fracture of right femur, initial encounter (H) - S72.141A  Hypokalemia - E87.6   Do you have any other stressors you would like to discuss with your provider? No    Problems taking medications regularly:  None  Medication changes since discharge: None  Problems adhering to non-medication therapy:  None    Summary of hospitalization:  [facility] discharge summary reviewed  Diagnostic Tests/Treatments reviewed.  Follow up needed: orthopedics as ordered  Other Healthcare Providers Involved in Patient s Care:         Homecare  Update since discharge: stable.       Plan of care communicated with patient       HOSPITAL COURSE: Please see my admit note from 4/25/2025--he was admitted to TCU after a IM nailing of right hip Fx.  He went to see Ortho on 5/6/2025 and was found to have a hematoma at surgical site that needed evacuation and had an US which showed a Right Peroneal DVT--went to [facility] on 5/7, had  the evacuation w/o issues on 5/7--had a PREVENA Wound Vac in place with F/u in 2 weeks with Ortho.  He was started on  "Eliquis for likely 30 days,  his ASA was put on hold due to being on a DOAC, Hgb was stable so sent back to TCU.         TCU/SNF COURSE: since that time, he went to Ortho on  5/19--Prevena removed, he has healed well. Edema is better right leg, he has no pains. Ortho wished for PCP to check US next week and decide on length of DOAC. I d/w his PCP and they defer to Dr Richy Junior to F/u with Doppler US and length of Eliquis.  I left a detailed msg today in Dr Richy Junior's 's phone re this.  His moods have been good.      Review of Systems  CONSTITUTIONAL: NEGATIVE for fever, chills, change in weight  EYES: NEGATIVE for vision changes or irritation  ENT/MOUTH: NEGATIVE for ear, mouth and throat problems  RESP: NEGATIVE for significant cough or SOB  CV: NEGATIVE for chest pain, palpitations or peripheral edema  GI: NEGATIVE for nausea, abdominal pain, heartburn, or change in bowel habits  : NEGATIVE for frequency, dysuria, or hematuria  NEURO: NEGATIVE for weakness, dizziness or paresthesias  HEME: NEGATIVE for bleeding problems  PSYCHIATRIC: NEGATIVE for changes in mood or affect      Objective    /60   Pulse 97   Temp 97.4  F (36.3  C) (Temporal)   Resp 16   Ht 1.778 m (5' 10\")   Wt 89.5 kg (197 lb 4.8 oz)   SpO2 97%   BMI 28.31 kg/m    Body mass index is 28.31 kg/m .    Physical Exam:    GENERAL: alert and no distress  EYES: Eyes grossly normal to inspection, PERRL and conjunctivae and sclerae normal  HENT: ear canals and TM's normal, nose and mouth without ulcers or lesions, + cerumen bilaterally.  RESP: lungs clear to auscultation - no rales, rhonchi or wheezes  CV: regular rate and rhythm, normal S1 S2, no S3 or S4, no click or rub, noted 1++ peripheral edema, R>L  Using wheeled walker.  ABDOMEN: obese, soft, nontender, no hepatosplenomegaly, no masses and bowel sounds normal  NEURO: No focal changes  PSYCH: mentation appears normal, affect normal/bright        Signed Electronically " by: Colton Chris MD

## 2025-06-05 ENCOUNTER — PATIENT OUTREACH (OUTPATIENT)
Dept: CARE COORDINATION | Facility: CLINIC | Age: OVER 89
End: 2025-06-05
Payer: COMMERCIAL

## 2025-06-05 ENCOUNTER — TRANSFERRED RECORDS (OUTPATIENT)
Dept: HEALTH INFORMATION MANAGEMENT | Facility: CLINIC | Age: OVER 89
End: 2025-06-05
Payer: COMMERCIAL

## 2025-06-09 ENCOUNTER — PATIENT OUTREACH (OUTPATIENT)
Dept: CARE COORDINATION | Facility: CLINIC | Age: OVER 89
End: 2025-06-09
Payer: COMMERCIAL

## 2025-06-16 DIAGNOSIS — Z85.46 HISTORY OF PROSTATE CANCER: Primary | ICD-10-CM

## 2025-06-16 NOTE — TELEPHONE ENCOUNTER
Patient's daughter called with patient on phone call.      1)They want to know when can patient stop taking Eliquis.  Patient had ultrasound to rule out DVT on 6/5 at [facility].  Triage called [facility] radiology and requested copy of ultrasound result.  If patient needs to continue taking Eliquis, patient needs refill sent to [facility].    2) Patient needs Flomax refilled.  He needs a short supply sent to local pharmacy [facility] in 90-day to [facility].  Rx sent preferred pharmacy pended.  Daughter plans to  today and 14 day refill today.      Routing refill request to provider for review/approval because:  Medication is reported/historical

## 2025-06-17 RX ORDER — TAMSULOSIN HYDROCHLORIDE 0.4 MG/1
0.4 CAPSULE ORAL AT BEDTIME
Qty: 10 CAPSULE | Refills: 0 | Status: SHIPPED | OUTPATIENT
Start: 2025-06-17

## 2025-06-17 NOTE — TELEPHONE ENCOUNTER
"Relayed providers response to Nicol. Nicol expressed frustrations that PCP is not willing to manage a common anticoagulation medication.   RN explained again Orthopedics will need to follow-up with their test results, Nicol said \"Yep, I've heard that before\" and ended the call.   "

## 2025-06-17 NOTE — TELEPHONE ENCOUNTER
Relayed providers response to Nicol.     Nicol already contacted Orthopedics, they did not order Eliquis and they can not manage the medication.   Eliquis was last prescribed by TCU provider.      Nicol is again asking PCP if patient is needing to continue Eliquis.

## 2025-06-17 NOTE — TELEPHONE ENCOUNTER
This ultrasound was ordered by orthopedics.  Patient needs to contact their orthopedist to determine ongoing need for anticoagulation.

## 2025-06-17 NOTE — TELEPHONE ENCOUNTER
Provider Response to 2nd Level Triage Request    I have reviewed the RN documentation. My recommendation is:  I talk specifically with the nursing home provider.  Initial ultrasound and initiation of the subsequent Eliquis was done by orthopedics.  Ultrasound was ordered by orthopedics.    Orthopedics needs to follow-up with their test results    Acute embolism and thrombosis of right peroneal vein (H)  Following up with orthopedics as directed for 30-day repeat lower extremity ultrasound.  Continuing with Eliquis as ordered.  Might benefit from 3 months treatment therapy.  Found to have occlusive DVT in the peroneal vein in the right calf. Would consider this a distal, provoked DVT. Discussed with orthopedic surgery, ultimately plan for apixaban 5 mg twice a day for 30 days and then reultrasound to see if any propagation of DVT. If not can likely stop anticoagulation at that time.      This patient apparently arrived at the TCU with the following note listed below which was part of the discharge summary from [facility].   At time of arrival at the TCU the patient had already been placed on Eliquis by the orthopedic provider or following physician at [facility].      See the care everywhere discharge summary from [facility] dated 5/6/2025.    Peroneal venoocclusive DVT  Found to have occlusive DVT in the peroneal vein in the right calf. Would consider this a distal, provoked DVT. Discussed with orthopedic surgery, ultimately plan for apixaban 5 mg twice a day for 30 days and then reultrasound to see if any propagation of DVT. If not can likely stop anticoagulation at that time.     I did not order the repeat ultrasound.  That study should go to the ordering provider.  It appears that if the ultrasound is negative that the plan was that the patient could stop his anticoagulation.

## 2025-06-19 ENCOUNTER — MEDICAL CORRESPONDENCE (OUTPATIENT)
Dept: HEALTH INFORMATION MANAGEMENT | Facility: CLINIC | Age: OVER 89
End: 2025-06-19
Payer: COMMERCIAL

## 2025-06-25 ENCOUNTER — MEDICAL CORRESPONDENCE (OUTPATIENT)
Dept: HEALTH INFORMATION MANAGEMENT | Facility: CLINIC | Age: OVER 89
End: 2025-06-25
Payer: COMMERCIAL

## 2025-06-25 ENCOUNTER — TELEPHONE (OUTPATIENT)
Dept: INTERNAL MEDICINE | Facility: CLINIC | Age: OVER 89
End: 2025-06-25
Payer: COMMERCIAL

## 2025-06-25 NOTE — TELEPHONE ENCOUNTER
Patient Contact    Attempt # 1    Was call answered?  No.  Left message on voicemail with information to call me back.    Upon call back: please relay message from the provider below.     Hollie Albarran RN

## 2025-06-25 NOTE — TELEPHONE ENCOUNTER
Pt called the clinic stating the hospital was the initial provider who started the blood thinner. Pt called them and they said they recommend PCP takes the medication over and advise pt when to stop taking the medication.     Pt is wondering when he can stop taking apixaban.     Routing to PCP to review and advise.     Please call pt back with PCPs recommendations.

## 2025-06-25 NOTE — TELEPHONE ENCOUNTER
As stated prior, I was not involved with the initiation of this therapy.  This appears to have been an orthopedic related issue.  Can only comment on the note that was written which is listed below.    Acute embolism and thrombosis of right peroneal vein (H)  Following up with orthopedics as directed for 30-day repeat lower extremity ultrasound.  Continuing with Eliquis as ordered.  Might benefit from 3 months treatment therapy.  Found to have occlusive DVT in the peroneal vein in the right calf. Would consider this a distal, provoked DVT. Discussed with orthopedic surgery, ultimately plan for apixaban 5 mg twice a day for 30 days and then reultrasound to see if any propagation of DVT. If not can likely stop anticoagulation at that time.    Per repeat ultrasound that was not ordered by me it appears that there is no evidence of a DVT thus I would follow the instructions that were laid out with the patient as above

## 2025-06-26 NOTE — TELEPHONE ENCOUNTER
Patient is on the only 1 antihypertensive therapy which is amlodipine Low-dose 2.5 mg daily.  If patient is without evidence of orthostatic change, lightheadedness or any symptoms that are of I would probably continue to watch his blood pressure accordingly and make sure and encourage hydration.  Orthostatic changes are noted no mass he may want to consider holding his amlodipine and observing his blood pressure very closely for precipitous increase.    Etiology of leg pain upon awakening is unclear.  If persists or worsens should be seen for further discussion.

## 2025-06-26 NOTE — TELEPHONE ENCOUNTER
Called and spoke with pt. Relayed providers message from below.     When pt is sitting for sometime, frequently his BP is around or below 100. Pt is concerned about this. SBPs havent been below 90.      Pt is also concerned that when he wakes up at night, his legs are achy/very painful when he first stands in both legs.     Pt is wondering if changes need to be made to his BP meds?     Routing to PCP to review and advise.

## 2025-06-26 NOTE — TELEPHONE ENCOUNTER
Patient Contact    Attempt # 1    Was call answered?  Yes. Pt verified name and date of birth. Relayed provider message. Pt verbalizes understanding and agrees to plan of care.

## 2025-07-06 ENCOUNTER — MEDICAL CORRESPONDENCE (OUTPATIENT)
Dept: HEALTH INFORMATION MANAGEMENT | Facility: CLINIC | Age: OVER 89
End: 2025-07-06
Payer: COMMERCIAL

## 2025-07-07 ENCOUNTER — NURSE TRIAGE (OUTPATIENT)
Dept: INTERNAL MEDICINE | Facility: CLINIC | Age: OVER 89
End: 2025-07-07
Payer: COMMERCIAL

## 2025-07-07 DIAGNOSIS — Z85.46 HISTORY OF PROSTATE CANCER: ICD-10-CM

## 2025-07-07 DIAGNOSIS — R19.5 LOOSE STOOLS: Primary | ICD-10-CM

## 2025-07-07 NOTE — TELEPHONE ENCOUNTER
Order/Referral Request    Who is requesting: Nicol    Orders being requested: lab order    Reason service is needed/diagnosis: Stool sample, due to dad (edson) is having like apple sauce consistency stool     When are orders needed by: ASAP    Has this been discussed with Provider: No    Does patient have a preference on a Group/Provider/Facility? [facility]    Does patient have an appointment scheduled?: No    Where to send orders: Place orders within Epic    Okay to leave a detailed message?: Yes at Other phone number: [phone] to let Nicol know so she can schedule to  the sample kit

## 2025-07-08 RX ORDER — TAMSULOSIN HYDROCHLORIDE 0.4 MG/1
0.4 CAPSULE ORAL AT BEDTIME
Qty: 90 CAPSULE | Refills: 1 | Status: SHIPPED | OUTPATIENT
Start: 2025-07-08

## 2025-07-08 NOTE — TELEPHONE ENCOUNTER
Placed an order in the computer for the C. difficile sample.  Suggest limiting stool softeners and laxatives if taking less in combination with pain medications.

## 2025-07-08 NOTE — TELEPHONE ENCOUNTER
Patient Contact    Attempt # 1    Was call answered?  No.  Left message on voicemail with information to call clinic back.    Upon callback, please TRIAGE pt for diarrhea/GI symptoms as reported below.    Note - pt scheduled with PCP for the following OV:    Jul 17, 2025 3:30 PM  (Arrive by 3:10 PM)  Provider Visit with Colton Chris MD  [facility] ([facility]) [phone]     Radha Kee RN

## 2025-07-08 NOTE — TELEPHONE ENCOUNTER
Daughter (C2C) requesting lab orders for stool sample to rule out C. Diff.   - Patient has no known exposures, was last on antibiotics 4/21/25.     Per daughter, patient is not efficient in cleaning after BM and she is finding stool everywhere in bathroom/floor starting 7/2/25.   She is unsure how many stools patient is having per day.  She is concerned he may be ingesting the stool through contamination, and causing C Diff.    Dark brown (not black) in color, no blood or mucous.  Stool is not watery, but is loose applesauce consistency.  Urination is normal  No N/V, abdominal pain, or fevers    Recently taking Miralax daily and Senna every other day due to recent hip surgery.   Daughter stopped giving senna yesterday.        Dispo: See in office within 3 days  Daughter declines, patient is currently scheduled to see PCP 7/17.   Daughter is out of town currently and is unable to  stool test until 7/14. She would like to have stool test completed and discuss results at .         Can we leave a detailed message on this number? YES  Phone number patient can be reached at:   Nicol Rubin (daughter)  (mobile) [phone]      Ally Leonardo RN  [facility] Triage      Reason for Disposition   MILD diarrhea (e.g., 1-3 or more stools than normal in past 24 hours) diarrhea and present > 7 days  (Exception: Chronic diarrhea that is not worse.)    Additional Information   Negative: Shock suspected (e.g., cold/pale/clammy skin, too weak to stand, low BP, rapid pulse)   Negative: Difficult to awaken or acting confused (e.g., disoriented, slurred speech)   Negative: Sounds like a life-threatening emergency to the triager   Negative: Vomiting also present and worse than the diarrhea   Negative: Blood in stool and without diarrhea   Negative: Diarrhea begins while taking an antibiotic by mouth (oral antibiotic)   Negative: SEVERE abdominal pain (e.g., excruciating) and present > 1 hour   Negative: SEVERE  abdominal pain and age > 60 years   Negative: Bloody, black, or tarry bowel movements  (Exception: Chronic-unchanged black-grey bowel movements and is taking iron pills or Pepto-Bismol.)   Negative: SEVERE diarrhea (e.g., 7 or more times / day more than normal) and age > 60 years   Negative: Constant abdominal pain lasting > 2 hours   Negative: Drinking very little and dehydration suspected (e.g., no urine > 12 hours, very dry mouth, very lightheaded)   Negative: Patient sounds very sick or weak to the triager   Negative: Travel to a foreign country in past month   Negative: Recent antibiotic therapy (i.e., within last 2 months) and diarrhea present > 3 days since antibiotic was stopped   Negative: Recent hospitalization and diarrhea present > 3 days   Negative: Tube feedings (e.g., nasogastric, g-tube, j-tube)   Negative: SEVERE diarrhea (e.g., 7 or more times / day more than normal) and present > 24 hours (1 day)   Negative: MODERATE diarrhea (e.g., 4-6 times / day more than normal) and present > 48 hours (2 days)   Negative: MODERATE diarrhea (e.g., 4-6 times / day more than normal) and age > 70 years   Negative: Abdominal pain  (Exceptions: Pain clears completely with each passage of diarrhea stool,  or symptoms similar to previously diagnosed irritable bowel syndrome.)   Negative: Fever > 101 F (38.3 C)   Negative: Blood in the stool  (Exception: Only on toilet paper. Reason: Diarrhea can cause rectal irritation with blood on wiping.)   Negative: Mucus or pus in stool has been present > 2 days and diarrhea is more than mild   Negative: Weak immune system (e.g., HIV positive, cancer chemo, splenectomy, organ transplant, chronic steroids)    Protocols used: Diarrhea-A-OH

## 2025-07-08 NOTE — TELEPHONE ENCOUNTER
Patient Contact    Attempt # 1    Was call answered?  Yes.  Spoke to Nicol and relayed provider message. Pt's daughter verbalized understanding and will collect stool sample if pt is still symptomatic.

## 2025-07-17 ENCOUNTER — OFFICE VISIT (OUTPATIENT)
Dept: INTERNAL MEDICINE | Facility: CLINIC | Age: OVER 89
End: 2025-07-17
Payer: COMMERCIAL

## 2025-07-17 VITALS
SYSTOLIC BLOOD PRESSURE: 110 MMHG | RESPIRATION RATE: 17 BRPM | DIASTOLIC BLOOD PRESSURE: 64 MMHG | WEIGHT: 203.2 LBS | HEART RATE: 73 BPM | BODY MASS INDEX: 29.09 KG/M2 | TEMPERATURE: 97.8 F | OXYGEN SATURATION: 96 % | HEIGHT: 70 IN

## 2025-07-17 DIAGNOSIS — I10 ESSENTIAL HYPERTENSION, BENIGN: Primary | ICD-10-CM

## 2025-07-17 DIAGNOSIS — R60.9 EDEMA, UNSPECIFIED TYPE: ICD-10-CM

## 2025-07-17 RX ORDER — FUROSEMIDE 20 MG/1
20 TABLET ORAL DAILY
Qty: 30 TABLET | Refills: 0 | Status: CANCELLED | OUTPATIENT
Start: 2025-07-17

## 2025-07-17 NOTE — PROGRESS NOTES
"  {PROVIDER CHARTING PREFERENCE:843223}    Maty Del Castillo is a 89 year old, presenting for the following health issues:  Leg Swelling and Bowel Problems    History of Present Illness       Reason for visit:  Medication swollen feet hip hurts when walking at night to move feet andhipshurt    He eats 0-1 servings of fruits and vegetables daily.He consumes 0 sweetened beverage(s) daily.He exercises with enough effort to increase his heart rate 9 or less minutes per day.  He exercises with enough effort to increase his heart rate 3 or less days per week. He is missing 1 dose(s) of medications per week.  He is not taking prescribed medications regularly due to remembering to take.      Patient requesting to review thyroid medication dosage     Other concerns:  1. Legs are painful to move at nighttime while trying to sleep.   2. Was having issues with stools being too soft, now only using senokot every 3 days and has not passed a stool for 2 days   3. Frequent urination, peeing every hour at night- patient wanting to stop flomax   4. Patient questioning if amlodipine can be stopped, concerned it is contributing to leg swelling          Review of Systems  CONSTITUTIONAL: NEGATIVE for fever, chills, change in weight  EYES: NEGATIVE for vision changes or irritation  ENT/MOUTH: NEGATIVE for ear, mouth and throat problems  RESP: NEGATIVE for significant cough or SOB  GI: NEGATIVE for nausea, abdominal pain, heartburn, or change in bowel habits  : NEGATIVE for frequency, dysuria, or hematuria  NEURO: NEGATIVE for weakness, dizziness or paresthesias  ENDOCRINE: NEGATIVE for temperature intolerance, skin/hair changes  HEME: NEGATIVE for bleeding problems  PSYCHIATRIC: NEGATIVE for changes in mood or affect      Objective    /64   Pulse 73   Temp 97.8  F (36.6  C) (Temporal)   Resp 17   Ht 1.778 m (5' 10\")   Wt 92.2 kg (203 lb 3.2 oz)   SpO2 96%   BMI 29.16 kg/m    Body mass index is 29.16 kg/m .  Physical Exam "   GENERAL: alert and no distress  EYES: Eyes grossly normal to inspection, PERRL and conjunctivae and sclerae normal  HENT: ear canals and TM's normal, nose and mouth without ulcers or lesions  NECK: no adenopathy, no asymmetry, masses, or scars  RESP: lungs clear to auscultation - no rales, rhonchi or wheezes  CV: regular rate and rhythm, normal S1 S2, no S3 or S4, no murmur, click or rub, no peripheral edema  ABDOMEN: soft, nontender, no hepatosplenomegaly, no masses and bowel sounds normal  MS: {:[id number]}  NEURO: Normal strength and tone, mentation intact and speech normal  PSYCH: mentation appears normal, affect normal/bright    {Diagnostic Test Results (Optional):026368}        Signed Electronically by: Colton Chris MD  {Email feedback regarding this note to [email or web address]   :[id number]}

## 2025-07-17 NOTE — PROGRESS NOTES
"  Assessment & Plan     Essential hypertension, benign  Stable on medical therapy.  Continue with previous medication as ordered.    Edema, unspecified type  Discussed cause and source of edema.  This appears to be more dependent in nature.  We discussed the addition of a diuretic versus discontinuing his calcium channel blocker.  At the present time he considers this not something that he would like to treat.  We will subsequently recommend support hose to his lower extremities while his legs are dependent and follow-up if symptoms were to change.      We also discussed more fluid on a daily basis to help his constipation in combination with his MiraLAX and Senokot.    BMI  Estimated body mass index is 29.16 kg/m  as calculated from the following:    Height as of this encounter: 1.778 m (5' 10\").    Weight as of this encounter: 92.2 kg (203 lb 3.2 oz).   Weight management plan: Discussed healthy diet and exercise guidelines    Maty Del Castillo is a 89 year old, presenting for the following health issues:  Leg Swelling and Bowel Problems    History of Present Illness       Reason for visit:  Medication swollen feet hip hurts when walking at night to move feet andhipshurt    He eats 0-1 servings of fruits and vegetables daily.He consumes 0 sweetened beverage(s) daily.He exercises with enough effort to increase his heart rate 9 or less minutes per day.  He exercises with enough effort to increase his heart rate 3 or less days per week. He is missing 1 dose(s) of medications per week.  He is not taking prescribed medications regularly due to remembering to take.        Patient is here today with his family member.  He has had some swelling in his lower extremities that has been chronic in nature.  He defines it more as a nuisance.  He notes his swelling is worse towards the end of the day and better when he awakens in the morning.  He denies any baseline changes in his breathing.  He denies traditional orthopnea or " PND.      He was advised to use his support hose which she has not been using on a consistent basis.  He does take amlodipine 2.5 mg daily.    Also has had some intermittent issues with constipation which she would like to discuss.    Current Outpatient Medications   Medication Sig Dispense Refill    acetaminophen (TYLENOL) 325 MG tablet Take 325 mg by mouth once.      amLODIPine (NORVASC) 2.5 MG tablet Take 2.5 mg by mouth daily.      apixaban ANTICOAGULANT (ELIQUIS) 5 MG tablet Take 5 mg by mouth 2 times daily.      CENTRUM SILVER OR TABS 1 TABLET DAILY      latanoprost (XALATAN) 0.005 % ophthalmic solution Place 1 drop into both eyes every evening.      levothyroxine (SYNTHROID/LEVOTHROID) 137 MCG tablet Take 1 tablet (137 mcg) by mouth daily. 100 tablet 2    polyethylene glycol (MIRALAX) 17 g packet Take 1 packet by mouth daily. 5/5/2025:  and 17gm po in a glass of fluid daily prn      senna-docusate (SENOKOT-S/PERICOLACE) 8.6-50 MG tablet Take 1 tablet by mouth 2 times daily as needed for constipation. (Patient taking differently: Take 1 tablet by mouth. Every 3 days) 30 tablet 0    tamsulosin (FLOMAX) 0.4 MG capsule Take 1 capsule (0.4 mg) by mouth at bedtime. 90 capsule 1    timolol maleate (TIMOPTIC) 0.5 % ophthalmic solution Place 1 drop into both eyes 2 times daily       No current facility-administered medications for this visit.       Review of Systems  CONSTITUTIONAL: NEGATIVE for fever, chills, change in weight  ENT/MOUTH: NEGATIVE for ear, mouth and throat problems  RESP: NEGATIVE for significant cough or SOB  GI: NEGATIVE for nausea, abdominal pain, heartburn, or change in bowel habits  MUSCULOSKELETAL: NEGATIVE for significant arthralgias or myalgia  NEURO: NEGATIVE for weakness, dizziness or paresthesias  HEME: NEGATIVE for bleeding problems  PSYCHIATRIC: NEGATIVE for changes in mood or affect    Current Outpatient Medications   Medication Sig Dispense Refill    acetaminophen (TYLENOL) 325 MG  "tablet Take 325 mg by mouth once.      amLODIPine (NORVASC) 2.5 MG tablet Take 2.5 mg by mouth daily.      apixaban ANTICOAGULANT (ELIQUIS) 5 MG tablet Take 5 mg by mouth 2 times daily.      CENTRUM SILVER OR TABS 1 TABLET DAILY      latanoprost (XALATAN) 0.005 % ophthalmic solution Place 1 drop into both eyes every evening.      levothyroxine (SYNTHROID/LEVOTHROID) 137 MCG tablet Take 1 tablet (137 mcg) by mouth daily. 100 tablet 2    polyethylene glycol (MIRALAX) 17 g packet Take 1 packet by mouth daily. 5/5/2025:  and 17gm po in a glass of fluid daily prn      senna-docusate (SENOKOT-S/PERICOLACE) 8.6-50 MG tablet Take 1 tablet by mouth 2 times daily as needed for constipation. (Patient taking differently: Take 1 tablet by mouth. Every 3 days) 30 tablet 0    tamsulosin (FLOMAX) 0.4 MG capsule Take 1 capsule (0.4 mg) by mouth at bedtime. 90 capsule 1    timolol maleate (TIMOPTIC) 0.5 % ophthalmic solution Place 1 drop into both eyes 2 times daily       No current facility-administered medications for this visit.           Objective    /64   Pulse 73   Temp 97.8  F (36.6  C) (Temporal)   Resp 17   Ht 1.778 m (5' 10\")   Wt 92.2 kg (203 lb 3.2 oz)   SpO2 96%   BMI 29.16 kg/m    Body mass index is 29.16 kg/m .  Physical Exam   GENERAL: alert and no distress  EYES: Eyes grossly normal to inspection, PERRL and conjunctivae and sclerae normal  HENT: ear canals and TM's normal, nose and mouth without ulcers or lesions  RESP: lungs clear to auscultation - no rales, rhonchi or wheezes  CV: regular rate and rhythm, normal S1 S2, no S3 or S4, no murmur, click or rub, noted 1++ peripheral edema  NEURO: No focal changes  PSYCH: mentation appears normal, affect normal/bright          Signed Electronically by: Colton Chris MD    "

## 2025-07-29 ENCOUNTER — MEDICAL CORRESPONDENCE (OUTPATIENT)
Dept: HEALTH INFORMATION MANAGEMENT | Facility: CLINIC | Age: OVER 89
End: 2025-07-29
Payer: COMMERCIAL

## 2025-08-05 ENCOUNTER — OFFICE VISIT (OUTPATIENT)
Dept: INTERNAL MEDICINE | Facility: CLINIC | Age: OVER 89
End: 2025-08-05
Payer: COMMERCIAL

## 2025-08-05 VITALS
BODY MASS INDEX: 29.29 KG/M2 | RESPIRATION RATE: 17 BRPM | DIASTOLIC BLOOD PRESSURE: 76 MMHG | HEIGHT: 70 IN | OXYGEN SATURATION: 96 % | TEMPERATURE: 97.7 F | SYSTOLIC BLOOD PRESSURE: 134 MMHG | WEIGHT: 204.6 LBS | HEART RATE: 72 BPM

## 2025-08-05 DIAGNOSIS — Z01.818 PRE-OPERATIVE GENERAL PHYSICAL EXAMINATION: Primary | ICD-10-CM

## 2025-08-05 DIAGNOSIS — I10 ESSENTIAL (PRIMARY) HYPERTENSION: ICD-10-CM

## 2025-08-05 DIAGNOSIS — H25.9 SENILE CATARACT, UNSPECIFIED AGE-RELATED CATARACT TYPE, UNSPECIFIED LATERALITY: ICD-10-CM

## 2025-08-05 DIAGNOSIS — I63.81 LACUNAR INFARCTION (H): ICD-10-CM

## 2025-08-05 DIAGNOSIS — I82.451: ICD-10-CM

## 2025-08-05 DIAGNOSIS — M17.0 PRIMARY OSTEOARTHRITIS OF BOTH KNEES: ICD-10-CM

## 2025-08-05 DIAGNOSIS — E03.9 HYPOTHYROIDISM, UNSPECIFIED TYPE: ICD-10-CM

## 2025-08-05 LAB
ANION GAP SERPL CALCULATED.3IONS-SCNC: 11 MMOL/L (ref 7–15)
BUN SERPL-MCNC: 17.6 MG/DL (ref 8–23)
CALCIUM SERPL-MCNC: 9.2 MG/DL (ref 8.8–10.4)
CHLORIDE SERPL-SCNC: 99 MMOL/L (ref 98–107)
CREAT SERPL-MCNC: 0.99 MG/DL (ref 0.67–1.17)
EGFRCR SERPLBLD CKD-EPI 2021: 73 ML/MIN/1.73M2
ERYTHROCYTE [DISTWIDTH] IN BLOOD BY AUTOMATED COUNT: 12 % (ref 10–15)
GLUCOSE SERPL-MCNC: 153 MG/DL (ref 70–99)
HCO3 SERPL-SCNC: 27 MMOL/L (ref 22–29)
HCT VFR BLD AUTO: 40.1 % (ref 40–53)
HGB BLD-MCNC: 14 G/DL (ref 13.3–17.7)
MCH RBC QN AUTO: 32.9 PG (ref 26.5–33)
MCHC RBC AUTO-ENTMCNC: 34.9 G/DL (ref 31.5–36.5)
MCV RBC AUTO: 94 FL (ref 78–100)
PLATELET # BLD AUTO: 142 10E3/UL (ref 150–450)
POTASSIUM SERPL-SCNC: 4.1 MMOL/L (ref 3.4–5.3)
RBC # BLD AUTO: 4.25 10E6/UL (ref 4.4–5.9)
SODIUM SERPL-SCNC: 137 MMOL/L (ref 135–145)
WBC # BLD AUTO: 5.2 10E3/UL (ref 4–11)

## 2025-08-05 PROCEDURE — 3075F SYST BP GE 130 - 139MM HG: CPT | Performed by: INTERNAL MEDICINE

## 2025-08-05 PROCEDURE — 99214 OFFICE O/P EST MOD 30 MIN: CPT | Performed by: INTERNAL MEDICINE

## 2025-08-05 PROCEDURE — 3078F DIAST BP <80 MM HG: CPT | Performed by: INTERNAL MEDICINE

## 2025-08-05 PROCEDURE — 36415 COLL VENOUS BLD VENIPUNCTURE: CPT | Performed by: INTERNAL MEDICINE

## 2025-08-05 PROCEDURE — 85027 COMPLETE CBC AUTOMATED: CPT | Performed by: INTERNAL MEDICINE

## 2025-08-05 PROCEDURE — 80048 BASIC METABOLIC PNL TOTAL CA: CPT | Performed by: INTERNAL MEDICINE

## 2025-08-06 ENCOUNTER — RESULTS FOLLOW-UP (OUTPATIENT)
Dept: INTERNAL MEDICINE | Facility: CLINIC | Age: OVER 89
End: 2025-08-06
Payer: COMMERCIAL

## 2025-09-02 ENCOUNTER — TRANSFERRED RECORDS (OUTPATIENT)
Dept: HEALTH INFORMATION MANAGEMENT | Facility: CLINIC | Age: OVER 89
End: 2025-09-02
Payer: COMMERCIAL

## 2025-11-11 ENCOUNTER — PRE VISIT (OUTPATIENT)
Dept: NEUROLOGY | Facility: CLINIC | Age: OVER 89
End: 2025-11-11

## (undated) DEVICE — SOL WATER IRRIG 1000ML BOTTLE 2F7114

## (undated) DEVICE — SUCTION MANIFOLD NEPTUNE 2 SYS 4 PORT 0702-020-000

## (undated) DEVICE — DRILL SRG 360MM 4.2MM T2 ALPHA LCK 2352-4236S

## (undated) DEVICE — REAMER SRG LG SCR NS LTX REUSE 1420-0240

## (undated) DEVICE — DRSG XEROFORM 1X8"

## (undated) DEVICE — DRAPE C-ARMOR 5 SIDED 5523

## (undated) DEVICE — GOWN IMPERVIOUS SPECIALTY XL/XLONG 39049

## (undated) DEVICE — KIT PATIENT CARE HANA TABLE PROFX SUPINE 6855

## (undated) DEVICE — SU VICRYL 2-0 CT-2 27" UND J269H

## (undated) DEVICE — SOL NACL 0.9% IRRIG 1000ML BOTTLE 2F7124

## (undated) DEVICE — ESU GROUND PAD UNIVERSAL W/O CORD

## (undated) DEVICE — DRAPE C-ARM 60X42" 1013

## (undated) DEVICE — Device

## (undated) DEVICE — PREP CHLORAPREP 26ML TINTED HI-LITE ORANGE 930815

## (undated) DEVICE — SU VICRYL 0 CT-1 27" UND J260H

## (undated) DEVICE — DRSG TEGADERM 6X8" 1628

## (undated) DEVICE — SU VICRYL 0 CT-1 27" J340H

## (undated) DEVICE — LINEN TOWEL PACK X5 5464

## (undated) DEVICE — BAG DECANTER STERILE WHITE DYNJDEC09

## (undated) DEVICE — STPL SKIN 35W 6.9MM  PXW35

## (undated) RX ORDER — FENTANYL CITRATE 50 UG/ML
INJECTION, SOLUTION INTRAMUSCULAR; INTRAVENOUS
Status: DISPENSED
Start: 2025-04-21

## (undated) RX ORDER — ONDANSETRON 2 MG/ML
INJECTION INTRAMUSCULAR; INTRAVENOUS
Status: DISPENSED
Start: 2025-04-21

## (undated) RX ORDER — BUPIVACAINE HYDROCHLORIDE AND EPINEPHRINE 5; 5 MG/ML; UG/ML
INJECTION, SOLUTION EPIDURAL; INTRACAUDAL; PERINEURAL
Status: DISPENSED
Start: 2025-04-21

## (undated) RX ORDER — DEXAMETHASONE SODIUM PHOSPHATE 4 MG/ML
INJECTION, SOLUTION INTRA-ARTICULAR; INTRALESIONAL; INTRAMUSCULAR; INTRAVENOUS; SOFT TISSUE
Status: DISPENSED
Start: 2025-04-21

## (undated) RX ORDER — HYDROMORPHONE HCL IN WATER/PF 6 MG/30 ML
PATIENT CONTROLLED ANALGESIA SYRINGE INTRAVENOUS
Status: DISPENSED
Start: 2025-04-21

## (undated) RX ORDER — FENTANYL CITRATE 0.05 MG/ML
INJECTION, SOLUTION INTRAMUSCULAR; INTRAVENOUS
Status: DISPENSED
Start: 2025-04-21